# Patient Record
Sex: MALE | Race: WHITE | Employment: FULL TIME | URBAN - METROPOLITAN AREA
[De-identification: names, ages, dates, MRNs, and addresses within clinical notes are randomized per-mention and may not be internally consistent; named-entity substitution may affect disease eponyms.]

---

## 2018-03-26 ENCOUNTER — OFFICE VISIT (OUTPATIENT)
Dept: FAMILY MEDICINE CLINIC | Age: 58
End: 2018-03-26

## 2018-03-26 VITALS
SYSTOLIC BLOOD PRESSURE: 120 MMHG | OXYGEN SATURATION: 97 % | HEIGHT: 72 IN | TEMPERATURE: 98.1 F | DIASTOLIC BLOOD PRESSURE: 80 MMHG | BODY MASS INDEX: 27.36 KG/M2 | HEART RATE: 67 BPM | RESPIRATION RATE: 16 BRPM | WEIGHT: 202 LBS

## 2018-03-26 DIAGNOSIS — J32.9 SINOBRONCHITIS: Primary | ICD-10-CM

## 2018-03-26 DIAGNOSIS — J40 SINOBRONCHITIS: Primary | ICD-10-CM

## 2018-03-26 PROCEDURE — 99213 OFFICE O/P EST LOW 20 MIN: CPT | Performed by: PHYSICIAN ASSISTANT

## 2018-03-26 RX ORDER — CEFDINIR 300 MG/1
300 CAPSULE ORAL 2 TIMES DAILY
Qty: 20 CAPSULE | Refills: 0 | Status: SHIPPED | OUTPATIENT
Start: 2018-03-26 | End: 2018-04-05

## 2018-03-26 NOTE — PROGRESS NOTES
Colonoscopy (08, 09, 2012 Saurabh Velasquez). Social History:  reports that he has been smoking Cigars. He has a 15.00 pack-year smoking history. He uses smokeless tobacco. He reports that he drinks about 3.0 oz of alcohol per week . He reports that he does not use drugs. Family History: family history includes Cancer in his brother; Heart Disease (age of onset: 61) in his father. Allergies: Patient has no known allergies. Physical Exam         VS:  /80   Pulse 67   Temp 98.1 °F (36.7 °C)   Resp 16   Ht 6' (1.829 m)   Wt 202 lb (91.6 kg)   SpO2 97%   BMI 27.40 kg/m²    Oxygen Saturation Interpretation: Normal.    Constitutional:  Alert, development consistent with age. Head: Moderate TTP over the frontal and maxillary sinuses. Ears:  External Ears: Bilateral pinna normal. TMs dull without erythema or perforation bilaterally. Canals normal bilaterally without swelling or exudate  Nose:  Moderate congestion of the nasal mucosa. There is mild injection to middle turbinates bilaterally. Throat: Mild posterior pharyngeal erythema with mild post nasal drip present. No exudate or tonsillar hypertrophy noted. Neck:  Supple. There is no anterior cervical adenopathy. Lungs: Breath sounds are course bilaterally with mild end-expiratory wheezes throughout. No rales or rhonchi. Heart:  Regular rate and rhythm, normal heart sounds, without pathological murmurs, ectopy, gallops, or rubs. Skin:  Normal turgor. Warm, dry, without visible rash. Neurological:  Alert and oriented. Motor functions intact. Responds to verbal commands. Lab / Imaging Results   (All laboratory and radiology results have been personally reviewed by myself)  Labs:  No results found for this visit on 03/26/18. Assessment / Plan     Impression(s):  1. Sinobronchitis      Disposition:  Disposition: Discharge to home    Script written for Omnicef, side effects discussed.  Pt also offered prednisone today but he declined d/t unwanted side effects. He agrees to follow up with his PCP if he begins to experience SOB, wheezing, or persistent cough. Increase fluids and rest. Additional symptomatic relief discussed. F/u PCP in 5-7 days if symptoms persist. ED sooner if symptoms worsen or change. Red flag symptoms discussed. Pt is in agreement with this care plan. All questions answered.

## 2018-05-09 ENCOUNTER — HOSPITAL ENCOUNTER (OUTPATIENT)
Age: 58
Discharge: HOME | End: 2018-05-09
Payer: COMMERCIAL

## 2018-05-09 LAB
ALBUMIN SERPL-MCNC: 4.3 G/DL (ref 3.5–5.2)
ALP BLD-CCNC: 48 U/L (ref 40–129)
ALT SERPL-CCNC: 14 U/L (ref 0–40)
ANION GAP SERPL CALCULATED.3IONS-SCNC: 14 MMOL/L (ref 7–16)
AST SERPL-CCNC: 19 U/L (ref 0–39)
BILIRUB SERPL-MCNC: 0.4 MG/DL (ref 0–1.2)
BUN BLDV-MCNC: 17 MG/DL (ref 6–20)
CALCIUM SERPL-MCNC: 9.1 MG/DL (ref 8.6–10.2)
CHLORIDE BLD-SCNC: 103 MMOL/L (ref 98–107)
CO2: 23 MMOL/L (ref 22–29)
CREAT SERPL-MCNC: 0.9 MG/DL (ref 0.7–1.2)
GFR AFRICAN AMERICAN: >60
GFR NON-AFRICAN AMERICAN: >60 ML/MIN/1.73
GLUCOSE BLD-MCNC: 106 MG/DL (ref 74–109)
POTASSIUM SERPL-SCNC: 3.7 MMOL/L (ref 3.5–5)
SODIUM BLD-SCNC: 140 MMOL/L (ref 132–146)
TOTAL PROTEIN: 7.1 G/DL (ref 6.4–8.3)

## 2018-05-09 PROCEDURE — 36415 COLL VENOUS BLD VENIPUNCTURE: CPT

## 2018-05-09 PROCEDURE — 80053 COMPREHEN METABOLIC PANEL: CPT

## 2018-08-29 RX ORDER — CARVEDILOL 12.5 MG/1
TABLET ORAL
Qty: 180 TABLET | Refills: 1 | Status: SHIPPED | OUTPATIENT
Start: 2018-08-29 | End: 2019-02-27 | Stop reason: SDUPTHER

## 2018-09-10 ENCOUNTER — OFFICE VISIT (OUTPATIENT)
Dept: FAMILY MEDICINE CLINIC | Age: 58
End: 2018-09-10
Payer: COMMERCIAL

## 2018-09-10 VITALS
BODY MASS INDEX: 25.05 KG/M2 | HEART RATE: 68 BPM | SYSTOLIC BLOOD PRESSURE: 125 MMHG | RESPIRATION RATE: 18 BRPM | WEIGHT: 189 LBS | HEIGHT: 73 IN | DIASTOLIC BLOOD PRESSURE: 86 MMHG

## 2018-09-10 DIAGNOSIS — J30.9 ALLERGIC RHINITIS, UNSPECIFIED SEASONALITY, UNSPECIFIED TRIGGER: Chronic | ICD-10-CM

## 2018-09-10 DIAGNOSIS — R49.0 HOARSENESS: Primary | ICD-10-CM

## 2018-09-10 DIAGNOSIS — Z72.0 TOBACCO ABUSE: Chronic | ICD-10-CM

## 2018-09-10 DIAGNOSIS — I10 ESSENTIAL HYPERTENSION, BENIGN: Chronic | ICD-10-CM

## 2018-09-10 PROCEDURE — 99213 OFFICE O/P EST LOW 20 MIN: CPT | Performed by: FAMILY MEDICINE

## 2018-09-10 RX ORDER — LORATADINE 10 MG/1
TABLET ORAL
Qty: 90 TABLET | Refills: 1 | Status: SHIPPED
Start: 2018-09-10 | End: 2020-06-12 | Stop reason: SDUPTHER

## 2018-09-10 ASSESSMENT — PATIENT HEALTH QUESTIONNAIRE - PHQ9
SUM OF ALL RESPONSES TO PHQ9 QUESTIONS 1 & 2: 0
SUM OF ALL RESPONSES TO PHQ QUESTIONS 1-9: 0
SUM OF ALL RESPONSES TO PHQ QUESTIONS 1-9: 0
2. FEELING DOWN, DEPRESSED OR HOPELESS: 0
1. LITTLE INTEREST OR PLEASURE IN DOING THINGS: 0

## 2018-09-20 ENCOUNTER — TELEPHONE (OUTPATIENT)
Dept: ENT CLINIC | Age: 58
End: 2018-09-20

## 2018-09-20 ENCOUNTER — OFFICE VISIT (OUTPATIENT)
Dept: ENT CLINIC | Age: 58
End: 2018-09-20
Payer: COMMERCIAL

## 2018-09-20 VITALS
OXYGEN SATURATION: 99 % | HEIGHT: 73 IN | WEIGHT: 185 LBS | SYSTOLIC BLOOD PRESSURE: 120 MMHG | BODY MASS INDEX: 24.52 KG/M2 | DIASTOLIC BLOOD PRESSURE: 85 MMHG | HEART RATE: 67 BPM

## 2018-09-20 DIAGNOSIS — R49.0 HOARSENESS: ICD-10-CM

## 2018-09-20 DIAGNOSIS — J38.3 LESION OF VOCAL CORD: Primary | ICD-10-CM

## 2018-09-20 PROCEDURE — 31575 DIAGNOSTIC LARYNGOSCOPY: CPT | Performed by: OTOLARYNGOLOGY

## 2018-09-20 PROCEDURE — 99204 OFFICE O/P NEW MOD 45 MIN: CPT | Performed by: OTOLARYNGOLOGY

## 2018-09-20 ASSESSMENT — ENCOUNTER SYMPTOMS
GASTROINTESTINAL NEGATIVE: 1
EYES NEGATIVE: 1
SHORTNESS OF BREATH: 0
COLOR CHANGE: 0
ABDOMINAL PAIN: 0
RESPIRATORY NEGATIVE: 1

## 2018-09-20 NOTE — TELEPHONE ENCOUNTER
Spoke with Yuli at Dr. Diann Johnson office requesting clearance for patients Panendoscopy. Yuli called back and stated they will bring patient in at 3:00PM for EKG for clearance.

## 2018-09-20 NOTE — PATIENT INSTRUCTIONS
Thank you for choosing our [facility] or [facility] practice. We are committed to your medical treatment and  care. If you need to reschedule or cancel your surgery or follow up  appointment, please call the surgery scheduler at [phone]. INSTRUCTIONS FOR SURGERY 10/03/2018    Nothing to eat or drink after midnight the night before surgery unless surgery is at [facility] or otherwise instructed by the hospital.    DO NOT TAKE ANY ASPIRIN PRODUCTS 7 days prior to surgery. Tylenol only. No Advil, Motrin, Aleve, or Ibuprofen    Any illegal drugs in your system (including Marijuana even if legally prescribed) will result in your surgery being cancelled. Please be sure to check with our office or the hospital on time frame for the drugs to be out of your system. Should your insurance change at any time you must contact our office. Failure to do so may result in your surgery being rescheduled.     [facility] [address], [address] [place], [place] [address]let[facility] will call you a couple days prior to your surgery and give you further instructions, if any questions call them at [phone]

## 2018-09-20 NOTE — PROGRESS NOTES
oriented to person, place, and time. He appears well-developed and well-nourished. HENT:   Head: Normocephalic and atraumatic. Right Ear: Hearing, tympanic membrane, external ear and ear canal normal.   Left Ear: Hearing, tympanic membrane, external ear and ear canal normal.   Nose: Nose normal.   Eyes: Pupils are equal, round, and reactive to light. Conjunctivae and EOM are normal.   Neck: Normal range of motion. Neck supple. Cardiovascular: Normal rate, regular rhythm and normal heart sounds. Pulmonary/Chest: Effort normal and breath sounds normal.   Abdominal: Soft. Bowel sounds are normal.   Neurological: He is alert and oriented to person, place, and time. Skin: Skin is warm and dry. Nursing note and vitals reviewed. Endoscopy Procedure Note    Pre-operative Diagnosis: Hoarseness    Post-operative Diagnosis: left vocal cord lesion    Indications: Hoarseness, dysphagia or aspiration - not able to be clearly evaluated by indirect laryngoscopy    Anesthesia: Lidocaine 4% and Carrillo-Synephrine 1/2%    Endoscopy Type:  nasal endoscopy, nasopharyngoscopy, laryngoscopy    Procedure Details   With the patient sitting upright in the examining chair informed consent was obtained. The bilateral side(s) of the nose were topically anesthetized with spray. After waiting an appropriate period of time for anesthesia/ vasoconstriction to become effective, the 0-degree  flexible laryngoscope was passed through the right side(s) of the nose, and the nose, nasopharynx, oropharynx, hypopharynx and larynx were examined. An identical procedure was performed on the contralateral side. Examination was performed during quiet respiration and with phonation. I was present for the entire procedure. The following findings were noted.     Findings:  Mucosa:  pale and boggy and erythematous   Nasal septum:  deviated to left   Turbinates:  pale, swollen, edematous   Adenoid:  normal   Eustachian tubes:  normal   Mucous

## 2018-09-21 ENCOUNTER — OFFICE VISIT (OUTPATIENT)
Dept: FAMILY MEDICINE CLINIC | Age: 58
End: 2018-09-21
Payer: COMMERCIAL

## 2018-09-21 VITALS
OXYGEN SATURATION: 98 % | RESPIRATION RATE: 16 BRPM | BODY MASS INDEX: 24.12 KG/M2 | SYSTOLIC BLOOD PRESSURE: 111 MMHG | WEIGHT: 182 LBS | HEIGHT: 73 IN | DIASTOLIC BLOOD PRESSURE: 79 MMHG | HEART RATE: 74 BPM

## 2018-09-21 DIAGNOSIS — J41.0 SIMPLE CHRONIC BRONCHITIS (HCC): Primary | Chronic | ICD-10-CM

## 2018-09-21 DIAGNOSIS — J38.2 VOCAL CORD NODULE: ICD-10-CM

## 2018-09-21 DIAGNOSIS — I10 ESSENTIAL HYPERTENSION, BENIGN: Chronic | ICD-10-CM

## 2018-09-21 PROCEDURE — 99213 OFFICE O/P EST LOW 20 MIN: CPT | Performed by: FAMILY MEDICINE

## 2018-09-21 PROCEDURE — 93000 ELECTROCARDIOGRAM COMPLETE: CPT | Performed by: FAMILY MEDICINE

## 2018-09-21 NOTE — PROGRESS NOTES
Scheduled for vocal cord surgery next week with Dr. Talya Grewal  No exertional chest pain  No dyspnea  Remote history of atrial fibrillation that was treated with ablation with excellent results. Patient is otherwise doing well except for hoarseness. There is no dysphagia or pain. No weight loss. No cough or wheezing. No history of coronary artery disease or of chronic lung disease. Physical examination :  /79   Pulse 74   Resp 16   Ht 6' 1\" (1.854 m)   Wt 182 lb (82.6 kg)   SpO2 98%   BMI 24.01 kg/m²     General appearance : healthy, no distress. Eyes : non-icteric sclerae. No goiter. Neck is supple, no masses. No carotid bruits  Lungs : clear bilaterally, no wheezing or crackles. Heart : regular, normal S1S2, no murmurs. Abdomen : soft, no masses. No hepatic or splenomegaly. Extremities : no edema. Peripheral pulses : normal.  Gait : normal.  Mood :euthymic. Affect : congruent. BMI was below limits today and the following plan was recommended: Gen. diet. EKG performed today is unremarkable. A/P  Vocal cord nodule. May proceed with surgery as scheduled. Patient fully informed.

## 2018-09-26 NOTE — PROGRESS NOTES
[facility] PRE-ADMISSION TESTING INSTRUCTIONS    The Preadmission Testing patient is instructed accordingly using the following criteria (check applicable):    ARRIVAL INSTRUCTIONS:  [x] Parking the day of Surgery is located in the Main Entrance lot. Upon entering the door, make an immediate right to the surgery reception desk    [x] Bring photo ID and insurance card    [] Bring in a copy of Living will or Durable Power of  papers. [x] Please be sure to arrange transportation to and from the hospital    [x] Please arrange for someone to be with you the remainder of the day due to having anesthesia      GENERAL INSTRUCTIONS:    [x] Nothing by mouth after midnight, including gum, candy, mints or water    [x] You may brush your teeth, but do not swallow any water    [x] Take medications as instructed with 1-2 oz of water    [] Stop herbal supplements and vitamins 5 days prior to procedure    [x] Follow preop dosing of blood thinners per physician instructions    [] Do not take insulin or oral diabetic medications    [] If diabetic and have low blood sugar or feel symptomatic, take 1-2oz apple juice or glucose tablets    [] Bring inhalers day of surgery    [] Bring C-PAP/ Bi-Pap day of surgery    [] Bring urine specimen day of surgery    [x] Antibacterial Soap shower or bath AM of Surgery, no lotion, powders or creams to surgical site    [] Follow bowel prep as instructed per surgeon    [x] No tobacco products within 24 hours of surgery     [x] No alcohol or illegal drug use within 24 hours of surgery.     [x] Jewelry, body piercing's, eyeglasses, contact lenses and dentures are not permitted into surgery (bring cases)      [] Please do not wear any nail polish or make up on the day of surgery    [x] If not already done, you can expect a call from registration    [x] If surgeon requests a time change you will be notified the day prior to surgery    [] If you receive a survey after

## 2018-09-27 ENCOUNTER — HOSPITAL ENCOUNTER (OUTPATIENT)
Dept: CT IMAGING | Age: 58
Discharge: HOME | End: 2018-09-29
Payer: COMMERCIAL

## 2018-09-27 DIAGNOSIS — J38.3 LESION OF VOCAL CORD: ICD-10-CM

## 2018-09-27 PROCEDURE — 70492 CT SFT TSUE NCK W/O & W/DYE: CPT

## 2018-09-27 PROCEDURE — 6360000004 HC RX CONTRAST MEDICATION: Performed by: RADIOLOGY

## 2018-09-27 RX ADMIN — IOPAMIDOL 90 ML: 755 INJECTION, SOLUTION INTRAVENOUS at 06:46

## 2018-09-28 ENCOUNTER — HOSPITAL ENCOUNTER (OUTPATIENT)
Age: 58
Setting detail: OUTPATIENT SURGERY
Discharge: HOME | End: 2018-09-28
Attending: OTOLARYNGOLOGY | Admitting: OTOLARYNGOLOGY
Payer: COMMERCIAL

## 2018-09-28 ENCOUNTER — ANESTHESIA (OUTPATIENT)
Dept: OPERATING ROOM | Age: 58
End: 2018-09-28
Payer: COMMERCIAL

## 2018-09-28 ENCOUNTER — ANESTHESIA EVENT (OUTPATIENT)
Dept: OPERATING ROOM | Age: 58
End: 2018-09-28
Payer: COMMERCIAL

## 2018-09-28 VITALS
RESPIRATION RATE: 18 BRPM | OXYGEN SATURATION: 96 % | BODY MASS INDEX: 25.05 KG/M2 | HEIGHT: 73 IN | SYSTOLIC BLOOD PRESSURE: 129 MMHG | TEMPERATURE: 97.9 F | HEART RATE: 60 BPM | DIASTOLIC BLOOD PRESSURE: 86 MMHG | WEIGHT: 189 LBS

## 2018-09-28 VITALS — SYSTOLIC BLOOD PRESSURE: 169 MMHG | OXYGEN SATURATION: 100 % | DIASTOLIC BLOOD PRESSURE: 94 MMHG

## 2018-09-28 DIAGNOSIS — G89.18 POST-OP PAIN: Primary | ICD-10-CM

## 2018-09-28 LAB
ANION GAP SERPL CALCULATED.3IONS-SCNC: 10 MMOL/L (ref 7–16)
BUN BLDV-MCNC: 12 MG/DL (ref 6–20)
CALCIUM SERPL-MCNC: 8.9 MG/DL (ref 8.6–10.2)
CHLORIDE BLD-SCNC: 102 MMOL/L (ref 98–107)
CO2: 25 MMOL/L (ref 22–29)
CREAT SERPL-MCNC: 0.8 MG/DL (ref 0.7–1.2)
GFR AFRICAN AMERICAN: >60
GFR NON-AFRICAN AMERICAN: >60 ML/MIN/1.73
GLUCOSE BLD-MCNC: 98 MG/DL (ref 74–109)
HCT VFR BLD CALC: 44.8 % (ref 37–54)
HEMOGLOBIN: 15.1 G/DL (ref 12.5–16.5)
MCH RBC QN AUTO: 32.2 PG (ref 26–35)
MCHC RBC AUTO-ENTMCNC: 33.7 % (ref 32–34.5)
MCV RBC AUTO: 95.5 FL (ref 80–99.9)
PDW BLD-RTO: 13.3 FL (ref 11.5–15)
PLATELET # BLD: 277 E9/L (ref 130–450)
PMV BLD AUTO: 9.1 FL (ref 7–12)
POTASSIUM SERPL-SCNC: 4.3 MMOL/L (ref 3.5–5)
RBC # BLD: 4.69 E12/L (ref 3.8–5.8)
SODIUM BLD-SCNC: 137 MMOL/L (ref 132–146)
WBC # BLD: 10.6 E9/L (ref 4.5–11.5)

## 2018-09-28 PROCEDURE — 7100000011 HC PHASE II RECOVERY - ADDTL 15 MIN: Performed by: OTOLARYNGOLOGY

## 2018-09-28 PROCEDURE — 7100000001 HC PACU RECOVERY - ADDTL 15 MIN: Performed by: OTOLARYNGOLOGY

## 2018-09-28 PROCEDURE — 2500000003 HC RX 250 WO HCPCS: Performed by: NURSE ANESTHETIST, CERTIFIED REGISTERED

## 2018-09-28 PROCEDURE — 6360000002 HC RX W HCPCS: Performed by: NURSE ANESTHETIST, CERTIFIED REGISTERED

## 2018-09-28 PROCEDURE — 3700000001 HC ADD 15 MINUTES (ANESTHESIA): Performed by: OTOLARYNGOLOGY

## 2018-09-28 PROCEDURE — 7100000000 HC PACU RECOVERY - FIRST 15 MIN: Performed by: OTOLARYNGOLOGY

## 2018-09-28 PROCEDURE — 80048 BASIC METABOLIC PNL TOTAL CA: CPT

## 2018-09-28 PROCEDURE — 2580000003 HC RX 258: Performed by: NURSE ANESTHETIST, CERTIFIED REGISTERED

## 2018-09-28 PROCEDURE — 31622 DX BRONCHOSCOPE/WASH: CPT | Performed by: OTOLARYNGOLOGY

## 2018-09-28 PROCEDURE — 7100000010 HC PHASE II RECOVERY - FIRST 15 MIN: Performed by: OTOLARYNGOLOGY

## 2018-09-28 PROCEDURE — 43191 ESOPHAGOSCOPY RIGID TRNSO DX: CPT | Performed by: OTOLARYNGOLOGY

## 2018-09-28 PROCEDURE — 88305 TISSUE EXAM BY PATHOLOGIST: CPT

## 2018-09-28 PROCEDURE — 88331 PATH CONSLTJ SURG 1 BLK 1SPC: CPT

## 2018-09-28 PROCEDURE — 2580000003 HC RX 258: Performed by: OTOLARYNGOLOGY

## 2018-09-28 PROCEDURE — 85027 COMPLETE CBC AUTOMATED: CPT

## 2018-09-28 PROCEDURE — 3600000002 HC SURGERY LEVEL 2 BASE: Performed by: OTOLARYNGOLOGY

## 2018-09-28 PROCEDURE — 2709999900 HC NON-CHARGEABLE SUPPLY: Performed by: OTOLARYNGOLOGY

## 2018-09-28 PROCEDURE — 36415 COLL VENOUS BLD VENIPUNCTURE: CPT

## 2018-09-28 PROCEDURE — 3600000012 HC SURGERY LEVEL 2 ADDTL 15MIN: Performed by: OTOLARYNGOLOGY

## 2018-09-28 PROCEDURE — 6370000000 HC RX 637 (ALT 250 FOR IP): Performed by: OTOLARYNGOLOGY

## 2018-09-28 PROCEDURE — 31536 LARYNGOSCOPY W/BX & OP SCOPE: CPT | Performed by: OTOLARYNGOLOGY

## 2018-09-28 PROCEDURE — 3700000000 HC ANESTHESIA ATTENDED CARE: Performed by: OTOLARYNGOLOGY

## 2018-09-28 RX ORDER — SODIUM CHLORIDE 0.9 % (FLUSH) 0.9 %
10 SYRINGE (ML) INJECTION EVERY 12 HOURS SCHEDULED
Status: DISCONTINUED | OUTPATIENT
Start: 2018-09-28 | End: 2018-09-28 | Stop reason: HOSPADM

## 2018-09-28 RX ORDER — FENTANYL CITRATE 50 UG/ML
INJECTION, SOLUTION INTRAMUSCULAR; INTRAVENOUS PRN
Status: DISCONTINUED | OUTPATIENT
Start: 2018-09-28 | End: 2018-09-28 | Stop reason: SDUPTHER

## 2018-09-28 RX ORDER — MEPERIDINE HYDROCHLORIDE 25 MG/ML
12.5 INJECTION INTRAMUSCULAR; INTRAVENOUS; SUBCUTANEOUS EVERY 5 MIN PRN
Status: DISCONTINUED | OUTPATIENT
Start: 2018-09-28 | End: 2018-09-28 | Stop reason: HOSPADM

## 2018-09-28 RX ORDER — PREDNISONE 20 MG/1
40 TABLET ORAL DAILY
Qty: 10 TABLET | Refills: 0 | Status: SHIPPED | OUTPATIENT
Start: 2018-09-28 | End: 2018-10-03

## 2018-09-28 RX ORDER — DIPHENHYDRAMINE HYDROCHLORIDE 50 MG/ML
12.5 INJECTION INTRAMUSCULAR; INTRAVENOUS
Status: DISCONTINUED | OUTPATIENT
Start: 2018-09-28 | End: 2018-09-28 | Stop reason: HOSPADM

## 2018-09-28 RX ORDER — ROCURONIUM BROMIDE 10 MG/ML
INJECTION, SOLUTION INTRAVENOUS PRN
Status: DISCONTINUED | OUTPATIENT
Start: 2018-09-28 | End: 2018-09-28 | Stop reason: SDUPTHER

## 2018-09-28 RX ORDER — HYDROCODONE BITARTRATE AND ACETAMINOPHEN 5; 325 MG/1; MG/1
1 TABLET ORAL PRN
Status: DISCONTINUED | OUTPATIENT
Start: 2018-09-28 | End: 2018-09-28 | Stop reason: HOSPADM

## 2018-09-28 RX ORDER — PROPOFOL 10 MG/ML
INJECTION, EMULSION INTRAVENOUS PRN
Status: DISCONTINUED | OUTPATIENT
Start: 2018-09-28 | End: 2018-09-28 | Stop reason: SDUPTHER

## 2018-09-28 RX ORDER — GLYCOPYRROLATE 0.2 MG/ML
INJECTION INTRAMUSCULAR; INTRAVENOUS PRN
Status: DISCONTINUED | OUTPATIENT
Start: 2018-09-28 | End: 2018-09-28 | Stop reason: SDUPTHER

## 2018-09-28 RX ORDER — GLYCOPYRROLATE 1 MG/5 ML
SYRINGE (ML) INTRAVENOUS PRN
Status: DISCONTINUED | OUTPATIENT
Start: 2018-09-28 | End: 2018-09-28 | Stop reason: SDUPTHER

## 2018-09-28 RX ORDER — ONDANSETRON 2 MG/ML
INJECTION INTRAMUSCULAR; INTRAVENOUS PRN
Status: DISCONTINUED | OUTPATIENT
Start: 2018-09-28 | End: 2018-09-28 | Stop reason: SDUPTHER

## 2018-09-28 RX ORDER — SUCCINYLCHOLINE CHLORIDE 20 MG/ML
INJECTION INTRAMUSCULAR; INTRAVENOUS PRN
Status: DISCONTINUED | OUTPATIENT
Start: 2018-09-28 | End: 2018-09-28 | Stop reason: SDUPTHER

## 2018-09-28 RX ORDER — EPINEPHRINE NASAL SOLUTION 1 MG/ML
SOLUTION NASAL PRN
Status: DISCONTINUED | OUTPATIENT
Start: 2018-09-28 | End: 2018-09-28 | Stop reason: HOSPADM

## 2018-09-28 RX ORDER — LIDOCAINE HYDROCHLORIDE 20 MG/ML
INJECTION, SOLUTION INFILTRATION; PERINEURAL PRN
Status: DISCONTINUED | OUTPATIENT
Start: 2018-09-28 | End: 2018-09-28 | Stop reason: SDUPTHER

## 2018-09-28 RX ORDER — MIDAZOLAM HYDROCHLORIDE 1 MG/ML
INJECTION INTRAMUSCULAR; INTRAVENOUS PRN
Status: DISCONTINUED | OUTPATIENT
Start: 2018-09-28 | End: 2018-09-28 | Stop reason: SDUPTHER

## 2018-09-28 RX ORDER — DEXAMETHASONE SODIUM PHOSPHATE 4 MG/ML
INJECTION, SOLUTION INTRA-ARTICULAR; INTRALESIONAL; INTRAMUSCULAR; INTRAVENOUS; SOFT TISSUE PRN
Status: DISCONTINUED | OUTPATIENT
Start: 2018-09-28 | End: 2018-09-28 | Stop reason: SDUPTHER

## 2018-09-28 RX ORDER — SODIUM CHLORIDE 0.9 % (FLUSH) 0.9 %
10 SYRINGE (ML) INJECTION PRN
Status: DISCONTINUED | OUTPATIENT
Start: 2018-09-28 | End: 2018-09-28 | Stop reason: HOSPADM

## 2018-09-28 RX ORDER — SODIUM CHLORIDE, SODIUM LACTATE, POTASSIUM CHLORIDE, CALCIUM CHLORIDE 600; 310; 30; 20 MG/100ML; MG/100ML; MG/100ML; MG/100ML
INJECTION, SOLUTION INTRAVENOUS CONTINUOUS PRN
Status: DISCONTINUED | OUTPATIENT
Start: 2018-09-28 | End: 2018-09-28 | Stop reason: SDUPTHER

## 2018-09-28 RX ORDER — NEOSTIGMINE METHYLSULFATE 1 MG/ML
INJECTION, SOLUTION INTRAVENOUS PRN
Status: DISCONTINUED | OUTPATIENT
Start: 2018-09-28 | End: 2018-09-28 | Stop reason: SDUPTHER

## 2018-09-28 RX ORDER — HYDROCODONE BITARTRATE AND ACETAMINOPHEN 7.5; 325 MG/1; MG/1
1 TABLET ORAL EVERY 6 HOURS PRN
Qty: 10 TABLET | Refills: 0 | Status: SHIPPED | OUTPATIENT
Start: 2018-09-28 | End: 2018-10-02

## 2018-09-28 RX ORDER — HYDROCODONE BITARTRATE AND ACETAMINOPHEN 5; 325 MG/1; MG/1
2 TABLET ORAL PRN
Status: DISCONTINUED | OUTPATIENT
Start: 2018-09-28 | End: 2018-09-28 | Stop reason: HOSPADM

## 2018-09-28 RX ADMIN — Medication 0.6 MG: at 17:40

## 2018-09-28 RX ADMIN — ONDANSETRON HYDROCHLORIDE 4 MG: 2 INJECTION, SOLUTION INTRAMUSCULAR; INTRAVENOUS at 16:50

## 2018-09-28 RX ADMIN — FENTANYL CITRATE 25 MCG: 50 INJECTION, SOLUTION INTRAMUSCULAR; INTRAVENOUS at 17:10

## 2018-09-28 RX ADMIN — DEXAMETHASONE SODIUM PHOSPHATE 10 MG: 4 INJECTION, SOLUTION INTRAMUSCULAR; INTRAVENOUS at 16:50

## 2018-09-28 RX ADMIN — FENTANYL CITRATE 25 MCG: 50 INJECTION, SOLUTION INTRAMUSCULAR; INTRAVENOUS at 17:00

## 2018-09-28 RX ADMIN — FENTANYL CITRATE 100 MCG: 50 INJECTION, SOLUTION INTRAMUSCULAR; INTRAVENOUS at 16:38

## 2018-09-28 RX ADMIN — ROCURONIUM BROMIDE 20 MG: 10 SOLUTION INTRAVENOUS at 16:49

## 2018-09-28 RX ADMIN — PROPOFOL 120 MG: 10 INJECTION, EMULSION INTRAVENOUS at 16:38

## 2018-09-28 RX ADMIN — FENTANYL CITRATE 25 MCG: 50 INJECTION, SOLUTION INTRAMUSCULAR; INTRAVENOUS at 17:14

## 2018-09-28 RX ADMIN — FENTANYL CITRATE 50 MCG: 50 INJECTION, SOLUTION INTRAMUSCULAR; INTRAVENOUS at 17:30

## 2018-09-28 RX ADMIN — FENTANYL CITRATE 25 MCG: 50 INJECTION, SOLUTION INTRAMUSCULAR; INTRAVENOUS at 17:07

## 2018-09-28 RX ADMIN — GLYCOPYRROLATE 0.2 MG: 0.2 INJECTION, SOLUTION INTRAMUSCULAR; INTRAVENOUS at 16:30

## 2018-09-28 RX ADMIN — MIDAZOLAM HYDROCHLORIDE 2 MG: 1 INJECTION, SOLUTION INTRAMUSCULAR; INTRAVENOUS at 16:30

## 2018-09-28 RX ADMIN — SUCCINYLCHOLINE CHLORIDE 100 MG: 20 INJECTION, SOLUTION INTRAMUSCULAR; INTRAVENOUS at 16:38

## 2018-09-28 RX ADMIN — LIDOCAINE HYDROCHLORIDE 100 MG: 20 INJECTION, SOLUTION INFILTRATION; PERINEURAL at 16:38

## 2018-09-28 RX ADMIN — Medication 3 MG: at 17:40

## 2018-09-28 RX ADMIN — SODIUM CHLORIDE, POTASSIUM CHLORIDE, SODIUM LACTATE AND CALCIUM CHLORIDE: 600; 310; 30; 20 INJECTION, SOLUTION INTRAVENOUS at 16:30

## 2018-09-28 RX ADMIN — SODIUM CHLORIDE, POTASSIUM CHLORIDE, SODIUM LACTATE AND CALCIUM CHLORIDE: 600; 310; 30; 20 INJECTION, SOLUTION INTRAVENOUS at 16:54

## 2018-09-28 ASSESSMENT — PULMONARY FUNCTION TESTS
PIF_VALUE: 0
PIF_VALUE: 34
PIF_VALUE: 35
PIF_VALUE: 1
PIF_VALUE: 34
PIF_VALUE: 38
PIF_VALUE: 34
PIF_VALUE: 0
PIF_VALUE: 34
PIF_VALUE: 1
PIF_VALUE: 27
PIF_VALUE: 1
PIF_VALUE: 38
PIF_VALUE: 2
PIF_VALUE: 35
PIF_VALUE: 0
PIF_VALUE: 34
PIF_VALUE: 35
PIF_VALUE: 36
PIF_VALUE: 19
PIF_VALUE: 34
PIF_VALUE: 38
PIF_VALUE: 38
PIF_VALUE: 0
PIF_VALUE: 31
PIF_VALUE: 38
PIF_VALUE: 3
PIF_VALUE: 35
PIF_VALUE: 38
PIF_VALUE: 29
PIF_VALUE: 1
PIF_VALUE: 36
PIF_VALUE: 30
PIF_VALUE: 38
PIF_VALUE: 34
PIF_VALUE: 34
PIF_VALUE: 26
PIF_VALUE: 38
PIF_VALUE: 0
PIF_VALUE: 34
PIF_VALUE: 21
PIF_VALUE: 1
PIF_VALUE: 12
PIF_VALUE: 38
PIF_VALUE: 0
PIF_VALUE: 38
PIF_VALUE: 35
PIF_VALUE: 1
PIF_VALUE: 38
PIF_VALUE: 20
PIF_VALUE: 17
PIF_VALUE: 39
PIF_VALUE: 38
PIF_VALUE: 38
PIF_VALUE: 34
PIF_VALUE: 24
PIF_VALUE: 38
PIF_VALUE: 3
PIF_VALUE: 43
PIF_VALUE: 34
PIF_VALUE: 3
PIF_VALUE: 40
PIF_VALUE: 38
PIF_VALUE: 35
PIF_VALUE: 38
PIF_VALUE: 3
PIF_VALUE: 38
PIF_VALUE: 30
PIF_VALUE: 38
PIF_VALUE: 14
PIF_VALUE: 12
PIF_VALUE: 19

## 2018-09-28 ASSESSMENT — PAIN SCALES - GENERAL
PAINLEVEL_OUTOF10: 0

## 2018-09-28 ASSESSMENT — LIFESTYLE VARIABLES: SMOKING_STATUS: 1

## 2018-09-28 ASSESSMENT — PAIN DESCRIPTION - LOCATION: LOCATION: THROAT

## 2018-09-28 NOTE — PROGRESS NOTES
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

## 2018-09-28 NOTE — H&P
Subjective:      Patient ID:  Ángela Pickering is a 60 y.o. male.     HPI:     Hoarseness  Patient presents with hoarseness. The patient describes moderate episodes of hoarseness which are unchanged over time. The episodes began 3 month(s) ago.       Symptoms of gastroesophageal reflux is not noted. Tx: none      Symptoms of allergic rhinitis is noted. Tx: Claritin        Trauma/Surgeries in the chest or neck? no  Type:      Previous prolonged intubation: no  Time:   ago.       Never had hoarseness issues prior to this.                    History   Smoking Status    Current Every Day Smoker    Packs/day: 0.50    Years: 30.00    Types: Cigars   Smokeless Tobacco    Never Used       Comment: smoke cigars      Social History   Social History            Social History    Marital status:        Spouse name: N/A    Number of children: N/A    Years of education: N/A             Social History Main Topics    Smoking status: Current Every Day Smoker       Packs/day: 0.50       Years: 30.00       Types: Cigars    Smokeless tobacco: Never Used         Comment: smoke cigars    Alcohol use No    Drug use: No    Sexual activity: Yes       Partners: Female           Other Topics Concern    None          Social History Narrative    None                  Patient's medications, allergies, past medical, surgical, social and family histories were reviewed and updated as appropriate.           Review of Systems   Constitutional: Negative. Eyes: Negative. Negative for visual disturbance. Respiratory: Negative. Negative for shortness of breath. Cardiovascular: Negative. Negative for chest pain. Gastrointestinal: Negative. Negative for abdominal pain. Genitourinary: Negative. Musculoskeletal: Negative. Skin: Negative. Negative for color change. Neurological: Negative. Psychiatric/Behavioral: Negative. Negative for behavioral problems and hallucinations.    All other systems reviewed Turbinates:  pale, swollen, edematous   Adenoid:  normal   Eustachian tubes:  normal   Mucous stranding:  present   Lesions:  present   Modified Anderson's Maneuver not indicated   Larynx Lesion of left true vocal cord noted. Left cord limited in mobilty         Condition:  Stable     Complications:  None     Pictures:                                              Assessment:        Diagnosis Orders   1. Hoarseness      2. Lesion of vocal cord                             Plan:      I recommend:     Panendoscopy, with esophagoscopy, bronchoscopy and direct laryngoscopy, microsuspension with biposy of left vocal cord lesion; Frozen section     The procedure risks and benefits were discussed with the patient and family. Pt and family understood and decided to proceed with the surgery.        Surgical risks include:     -- Tearing of tissues is the most common problem. This is most common in esophagoscopy using a rigid scope. Tearing of the mucosa of the esophagus or hypopharynx can cause mediastinitus or a severe infection in the chest. Injury to the vocal cords can occur with laryngosopy as can injury to the lungs during bronchoscopy. Lung injury can cause air to leak into the chest cavity. If severe it can fill the chest cavity and compress the lungs producing a tension pneumothorax. Insertion of a chest tube would be emergently required to treat the patient.    - injury to lips, teeth or tongue            Follow up in 1 week(s)    Electronically signed by Cortez Mclaughlin DO on 9/28/18 at 7:32 AM

## 2018-09-28 NOTE — ANESTHESIA PRE PROCEDURE
Department of Anesthesiology  Preprocedure Note       Name:  Stefania Bess   Age:  60 y.o.  :  1960                                          MRN:  [id number]         Date:  2018      Surgeon: Cornelius Mcmillan):  Marissa Boudreaux DO    Procedure: Procedure(s):  PANDOSCOPY WITH BIOPSY LEFT TRUE VOCAL CORD WITH FROZEN SECTION ++PATHOLOGY NEEDED++  ESOPHAGOSCOPY RIGID    Medications prior to admission:   Prior to Admission medications    Medication Sig Start Date End Date Taking? Authorizing Provider   loratadine (CLARITIN) 10 MG tablet TAKE 1 TABLET BY MOUTH ONCE A DAY 9/10/18  Yes Henry Urban MD   carvedilol (COREG) 12.5 MG tablet TAKE 1 TABLET BY MOUTH TWICE A DAY 18  Yes Henry Urban MD   aspirin 81 MG tablet Take 81 mg by mouth daily.    Yes Historical Provider, MD       Current medications:    Current Facility-Administered Medications   Medication Dose Route Frequency Provider Last Rate Last Dose    sodium chloride flush 0.9 % injection 10 mL  10 mL Intravenous 2 times per day Sadaf Begum DO        sodium chloride flush 0.9 % injection 10 mL  10 mL Intravenous PRN Sadaf Begum DO        meperidine (DEMEROL) injection 12.5 mg  12.5 mg Intravenous Q5 Min PRN Johnny Nicole MD        diphenhydrAMINE (BENADRYL) injection 12.5 mg  12.5 mg Intravenous Once PRN Johnny Nicole MD        HYDROcodone-acetaminophen [facility]) 5-325 MG per tablet 1 tablet  1 tablet Oral PRN Johnny Nicole MD        Or    HYDROcodone-acetaminophen (NORCO) 5-325 MG per tablet 2 tablet  2 tablet Oral PRN Johnny Nicole MD        HYDROmorphone (DILAUDID) injection 0.5 mg  0.5 mg Intravenous Q5 Min PRN Johnny Nicole MD        HYDROmorphone (DILAUDID) injection 0.25 mg  0.25 mg Intravenous Q5 Min PRN Johnny Nicole MD           Allergies:  No Known Allergies    Problem List:    Patient Active Problem List   Diagnosis Code    Essential hypertension, benign I10    Cardiomyopathy ([facility] [address] I42.9    Status: Time of last liquid consumption: 2230                        Time of last solid consumption: 2230                        Date of last liquid consumption: 09/27/18                        Date of last solid food consumption: 09/27/18    BMI:   Wt Readings from Last 3 Encounters:   09/26/18 189 lb (85.7 kg)   09/21/18 182 lb (82.6 kg)   09/20/18 185 lb (83.9 kg)     Body mass index is 24.94 kg/m². CBC:   Lab Results   Component Value Date    WBC 10.9 12/05/2016    RBC 4.65 12/05/2016    HGB 14.8 12/05/2016    HCT 42.9 12/05/2016    MCV 92.2 12/05/2016    RDW 13.9 12/05/2016     12/05/2016       CMP:   Lab Results   Component Value Date     05/09/2018    K 3.7 05/09/2018     05/09/2018    CO2 23 05/09/2018    BUN 17 05/09/2018    CREATININE 0.9 05/09/2018    GFRAA >60 05/09/2018    LABGLOM >60 05/09/2018    GLUCOSE 106 05/09/2018    GLUCOSE 106 11/19/2011    PROT 7.1 05/09/2018    CALCIUM 9.1 05/09/2018    BILITOT 0.4 05/09/2018    ALKPHOS 48 05/09/2018    AST 19 05/09/2018    ALT 14 05/09/2018       POC Tests: No results for input(s): POCGLU, POCNA, POCK, POCCL, POCBUN, POCHEMO, POCHCT in the last 72 hours.     Coags:   Lab Results   Component Value Date    PROTIME 10.8 11/18/2011    INR 1.0 11/18/2011    APTT 26.5 11/18/2011       HCG (If Applicable): No results found for: PREGTESTUR, PREGSERUM, HCG, HCGQUANT     ABGs: No results found for: PHART, PO2ART, CZU1DSL, IVH5RYE, BEART, A7RAFYLB     Type & Screen (If Applicable):  No results found for: LABABO, [address]    Anesthesia Evaluation  Patient summary reviewed no history of anesthetic complications:   Airway: Mallampati: I  TM distance: >3 FB   Neck ROM: full   Dental: normal exam         Pulmonary: breath sounds clear to auscultation  (+) COPD:  current smoker                          ROS comment: Chronic rhinitis   Cardiovascular:    (+) hypertension:, dysrhythmias (s/p ablation): atrial fibrillation, hyperlipidemia      ECG reviewed  Rhythm: regular  Rate: normal           Beta Blocker:  Dose within 24 Hrs         Neuro/Psych:   Negative Neuro/Psych ROS              GI/Hepatic/Renal: Neg GI/Hepatic/Renal ROS            Endo/Other:                      ROS comment: History of alcohol abuse  impaired glucose tolerance Abdominal:           Vascular: negative vascular ROS. Anesthesia Plan      general     ASA 3       Induction: intravenous. Anesthetic plan and risks discussed with patient. Plan discussed with CRNA.                   Aaliyah Parrish MD   9/28/2018

## 2018-09-28 NOTE — ANESTHESIA POSTPROCEDURE EVALUATION
Department of Anesthesiology  Postprocedure Note    Patient: Natalio Jay  MRN: [id number]  YOB: 1960  Date of evaluation: 9/28/2018  Time:  7:43 PM     Procedure Summary     Date:  09/28/18 Room / Location:  [facility] OR 01 / [facility] OR    Anesthesia Start:  1630 Anesthesia Stop:  2631    Procedures:       PANDOSCOPY WITH BIOPSY LEFT TRUE VOCAL CORD WITH FROZEN SECTION ++PATHOLOGY NEEDED++ (N/A Throat)      ESOPHAGOSCOPY RIGID (N/A ) Diagnosis:  (LEFT VOCAL CORD LESION)    Surgeon:  Abel Andrews DO Responsible Provider:  Lucia Grande MD    Anesthesia Type:  general ASA Status:  3          Anesthesia Type: general    Thomas Phase I: Thomas Score: 8    Thomas Phase II: Thomas Score: 10    Last vitals: Reviewed and per EMR flowsheets.        Anesthesia Post Evaluation    Patient location during evaluation: PACU  Patient participation: complete - patient participated  Level of consciousness: awake and alert  Airway patency: patent  Nausea & Vomiting: no nausea and no vomiting  Complications: no  Cardiovascular status: hemodynamically stable  Respiratory status: acceptable  Hydration status: euvolemic

## 2018-09-29 NOTE — OP NOTE
[address] [address]                                 OPERATIVE REPORT    PATIENT NAME: Candace Vasquez                   :        1960  MED REC NO:   [id number]                            ROOM:  ACCOUNT NO:   [de-identified]                           ADMIT DATE: 2018  PROVIDER:     Suzie Alejo DO    DATE OF PROCEDURE:  2018    PREOPERATIVE DIAGNOSIS:  Left true vocal cord mass. POSTOPERATIVE DIAGNOSIS:  Left true vocal cord mass. PROCEDURES PERFORMED:  Panendoscopy with micro suspension, direct  laryngoscopy with biopsy of left true vocal cord. SURGEON:  Lonnie Juarez DO    ASSISTANTS:  Suzie Alejo, PGY-IV and Dr. Blanca Mobley, PGY-1.    ANESTHESIA:  General.    ESTIMATED BLOOD LOSS:  5 mL. SPECIMENS:  Were obtained, left true vocal cord mass. COMPLICATIONS:  None. INDICATIONS FOR PROCEDURE:  The patient is a 50-year-old male with a  history of 15-pack-year smoking. He presented to the office, and an  endoscopy revealed a left true vocal cord mass. Panendoscopy with biopsy  was recommended. The risks, benefits, and alternatives of the procedure  were discussed with the patient, who expressed understanding and agreed to  proceed. DESCRIPTION OF PROCEDURE:  The patient was brought into the OR and placed  supine on the operative table. Sequential compression devices were on and  functioning at the start of the procedure. No preoperative antibiotics  were administered. Prior to the start of the procedure, a timeout was  called and agreed upon by all present. The patient was then prepped and  draped in the usual fashion after anesthesia was obtained. First, a tooth protector was inserted into the patient's oral cavity. Next, a rigid esophagoscope was inserted into the oral cavity and advanced  into the cervical esophagus.   This was advanced past the upper expected to be discharged home today following appropriate  recovery in the PACU. Dr. Anuj Coleman was present for the entire procedure.         Valentino Hoyos DO    D: 09/28/2018 17:47:14       T: 09/29/2018 0:30:14     [email or web address]  Job#: [id number]     Doc#: [id number]    CC:

## 2018-10-05 ENCOUNTER — OFFICE VISIT (OUTPATIENT)
Dept: ENT CLINIC | Age: 58
End: 2018-10-05

## 2018-10-05 VITALS
HEART RATE: 77 BPM | DIASTOLIC BLOOD PRESSURE: 89 MMHG | OXYGEN SATURATION: 99 % | WEIGHT: 194 LBS | HEIGHT: 73 IN | SYSTOLIC BLOOD PRESSURE: 125 MMHG | BODY MASS INDEX: 25.71 KG/M2

## 2018-10-05 DIAGNOSIS — C32.0 SQUAMOUS CELL CARCINOMA OF LEFT VOCAL CORD (HCC): ICD-10-CM

## 2018-10-05 DIAGNOSIS — C32.0 CANCER OF GLOTTIS (HCC): ICD-10-CM

## 2018-10-05 PROCEDURE — 99024 POSTOP FOLLOW-UP VISIT: CPT | Performed by: OTOLARYNGOLOGY

## 2018-10-05 ASSESSMENT — ENCOUNTER SYMPTOMS
ABDOMINAL PAIN: 0
SHORTNESS OF BREATH: 0
RESPIRATORY NEGATIVE: 1
GASTROINTESTINAL NEGATIVE: 1
EYES NEGATIVE: 1
COLOR CHANGE: 0

## 2018-10-05 NOTE — PROGRESS NOTES
Subjective:      Patient ID:  Reji Mcnulty is a 60 y.o. male. HPI:    60 y.o. male here for 1 week PO panendoscopy with biopsy of L TVC and anterior commissure. Denies any pain or bleeding. Patient is still hoarse. History   Smoking Status    Current Every Day Smoker    Packs/day: 0.50    Years: 30.00    Types: Cigars   Smokeless Tobacco    Never Used     Comment: smoke cigars     Social History     Social History    Marital status:      Spouse name: N/A    Number of children: N/A    Years of education: N/A     Social History Main Topics    Smoking status: Current Every Day Smoker     Packs/day: 0.50     Years: 30.00     Types: Cigars    Smokeless tobacco: Never Used      Comment: smoke cigars    Alcohol use No    Drug use: No    Sexual activity: Yes     Partners: Female     Other Topics Concern    None     Social History Narrative    None       Patient's medications, allergies, past medical, surgical, social and family histories were reviewed and updated as appropriate. Review of Systems   Constitutional: Negative. Eyes: Negative. Negative for visual disturbance. Respiratory: Negative. Negative for shortness of breath. Cardiovascular: Negative. Negative for chest pain. Gastrointestinal: Negative. Negative for abdominal pain. Genitourinary: Negative. Musculoskeletal: Negative. Skin: Negative. Negative for color change. Neurological: Negative. Psychiatric/Behavioral: Negative. Negative for behavioral problems and hallucinations. All other systems reviewed and are negative. Objective:   Physical Exam   Constitutional: He is oriented to person, place, and time. He appears well-developed and well-nourished. HENT:   Head: Normocephalic and atraumatic.    Right Ear: Hearing, tympanic membrane, external ear and ear canal normal.   Left Ear: Hearing, tympanic membrane, external ear and ear canal normal.   Nose: Nose normal.   Eyes: Pupils are

## 2018-10-08 ENCOUNTER — TELEPHONE (OUTPATIENT)
Dept: ADMINISTRATIVE | Age: 58
End: 2018-10-08

## 2018-10-08 ENCOUNTER — TELEPHONE (OUTPATIENT)
Dept: ENT CLINIC | Age: 58
End: 2018-10-08

## 2018-10-15 ENCOUNTER — HOSPITAL ENCOUNTER (OUTPATIENT)
Dept: RADIATION ONCOLOGY | Age: 58
Discharge: HOME | End: 2018-10-15
Payer: COMMERCIAL

## 2018-10-15 VITALS
DIASTOLIC BLOOD PRESSURE: 77 MMHG | HEART RATE: 66 BPM | SYSTOLIC BLOOD PRESSURE: 112 MMHG | BODY MASS INDEX: 25.55 KG/M2 | WEIGHT: 192.8 LBS | TEMPERATURE: 98.1 F | HEIGHT: 73 IN

## 2018-10-15 DIAGNOSIS — I10 ESSENTIAL HYPERTENSION, BENIGN: Chronic | ICD-10-CM

## 2018-10-15 DIAGNOSIS — C32.0 SQUAMOUS CELL CARCINOMA OF LEFT VOCAL CORD (HCC): Primary | ICD-10-CM

## 2018-10-15 PROCEDURE — 99205 OFFICE O/P NEW HI 60 MIN: CPT

## 2018-10-15 PROCEDURE — 99245 OFF/OP CONSLTJ NEW/EST HI 55: CPT | Performed by: RADIOLOGY

## 2018-10-15 NOTE — PROGRESS NOTES
Radiation Oncology Consultation               Referring Physician: Katelyn Adamson MD      Primary Care Physician:Ranjan Ellison MD   Primary Oncologist: Katelyn Adamson M.D. 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 This was not associated with any left-sided earache nor any odynophagia or dysphagia. Patient was referred by his primary care physician Guero Larry M.D. to Dr. Katelyn Adamson M.D. the ENT service. During an in office examination on 9/20/2018, Dr. Ghanshyam Cai performed indirect fiber-optic laryngoscopy revealing no bulging abnormality involving the left true vocal cord which was somewhat limited in mobility without being truly paralyzed. The note is accompanied by detailed pictures and images which clearly show the lesion involving the anterior two thirds of the left true vocal cord with extension into the anterior commissure and to a less minimal extent anterior aspect of the contralateral right true vocal cord. On 9/28/2018 patient underwent an exam under anesthesia and pan endoscopy. Bronchoscopy in the portion of the esophagus scoped were within normal limits. Presence of the left-sided true vocal cord lesion was confirmed. Biopsies were taken which revealed invasive moderately differentiated squamous cell carcinoma. There was no evidence of subglottic extension.  Options were discussed and patient is now referred to us for discussion of possible definitive external beam radiation therapy.    -----    Past

## 2018-10-17 ENCOUNTER — HOSPITAL ENCOUNTER (OUTPATIENT)
Dept: RADIATION ONCOLOGY | Age: 58
Discharge: HOME | End: 2018-10-17
Payer: COMMERCIAL

## 2018-10-17 ENCOUNTER — TELEPHONE (OUTPATIENT)
Dept: RADIATION ONCOLOGY | Age: 58
End: 2018-10-17

## 2018-10-17 PROCEDURE — 77290 THER RAD SIMULAJ FIELD CPLX: CPT

## 2018-10-17 PROCEDURE — 77280 THER RAD SIMULAJ FIELD SMPL: CPT

## 2018-10-17 PROCEDURE — 77334 RADIATION TREATMENT AID(S): CPT

## 2018-10-18 ENCOUNTER — HOSPITAL ENCOUNTER (OUTPATIENT)
Dept: RADIATION ONCOLOGY | Age: 58
Discharge: HOME | End: 2018-10-18
Payer: COMMERCIAL

## 2018-10-18 PROCEDURE — 77332 RADIATION TREATMENT AID(S): CPT

## 2018-10-18 PROCEDURE — 77306 TELETHX ISODOSE PLAN SIMPLE: CPT

## 2018-10-19 ENCOUNTER — HOSPITAL ENCOUNTER (OUTPATIENT)
Age: 58
Discharge: HOME | End: 2018-10-19
Payer: COMMERCIAL

## 2018-10-19 DIAGNOSIS — C32.0 SQUAMOUS CELL CARCINOMA OF LEFT VOCAL CORD (HCC): ICD-10-CM

## 2018-10-19 DIAGNOSIS — I10 ESSENTIAL HYPERTENSION, BENIGN: Chronic | ICD-10-CM

## 2018-10-19 LAB — TSH SERPL DL<=0.05 MIU/L-ACNC: 1.09 UIU/ML (ref 0.27–4.2)

## 2018-10-19 PROCEDURE — 84443 ASSAY THYROID STIM HORMONE: CPT

## 2018-10-19 PROCEDURE — 36415 COLL VENOUS BLD VENIPUNCTURE: CPT

## 2018-10-20 ENCOUNTER — HOSPITAL ENCOUNTER (OUTPATIENT)
Dept: CT IMAGING | Age: 58
Discharge: HOME | End: 2018-10-22
Payer: COMMERCIAL

## 2018-10-20 DIAGNOSIS — C32.0 SQUAMOUS CELL CARCINOMA OF LEFT VOCAL CORD (HCC): ICD-10-CM

## 2018-10-20 PROCEDURE — 71260 CT THORAX DX C+: CPT

## 2018-10-20 PROCEDURE — 6360000004 HC RX CONTRAST MEDICATION: Performed by: RADIOLOGY

## 2018-10-20 RX ADMIN — IOPAMIDOL 80 ML: 755 INJECTION, SOLUTION INTRAVENOUS at 15:43

## 2018-10-22 ENCOUNTER — PRE-PROCEDURE TELEPHONE (OUTPATIENT)
Dept: RADIATION ONCOLOGY | Age: 58
End: 2018-10-22

## 2018-10-22 PROCEDURE — 77412 RADIATION TX DELIVERY LVL 3: CPT

## 2018-10-22 PROCEDURE — 77387 GUIDANCE FOR RADJ TX DLVR: CPT

## 2018-10-22 PROCEDURE — 77402 RADIATION TX DELIVERY LVL 1: CPT

## 2018-10-23 ENCOUNTER — HOSPITAL ENCOUNTER (OUTPATIENT)
Dept: RADIATION ONCOLOGY | Age: 58
Discharge: HOME | End: 2018-10-23
Payer: COMMERCIAL

## 2018-10-23 VITALS
BODY MASS INDEX: 25.6 KG/M2 | DIASTOLIC BLOOD PRESSURE: 78 MMHG | WEIGHT: 194 LBS | SYSTOLIC BLOOD PRESSURE: 122 MMHG | HEART RATE: 71 BPM

## 2018-10-23 PROCEDURE — 77402 RADIATION TX DELIVERY LVL 1: CPT

## 2018-10-23 PROCEDURE — 77412 RADIATION TX DELIVERY LVL 3: CPT

## 2018-10-23 PROCEDURE — 77387 GUIDANCE FOR RADJ TX DLVR: CPT

## 2018-10-24 PROCEDURE — 77402 RADIATION TX DELIVERY LVL 1: CPT

## 2018-10-24 PROCEDURE — 77412 RADIATION TX DELIVERY LVL 3: CPT

## 2018-10-24 PROCEDURE — 77387 GUIDANCE FOR RADJ TX DLVR: CPT

## 2018-10-25 PROCEDURE — 77412 RADIATION TX DELIVERY LVL 3: CPT

## 2018-10-25 PROCEDURE — 77387 GUIDANCE FOR RADJ TX DLVR: CPT

## 2018-10-25 PROCEDURE — 77402 RADIATION TX DELIVERY LVL 1: CPT

## 2018-10-25 NOTE — PROGRESS NOTES
Brittany Rodriguezcelena  10/25/2018  Wt Readings from Last 10 Encounters:   10/23/18 194 lb (88 kg)   10/15/18 192 lb 12.8 oz (87.5 kg)   10/05/18 194 lb (88 kg)   09/26/18 189 lb (85.7 kg)   09/21/18 182 lb (82.6 kg)   09/20/18 185 lb (83.9 kg)   09/10/18 189 lb (85.7 kg)   03/26/18 202 lb (91.6 kg)   01/12/18 202 lb (91.6 kg)   12/08/16 194 lb (88 kg)     Ht Readings from Last 1 Encounters:   10/15/18 6' 1\" (1.854 m)     Met with pt today for initial visit and introductions. Pt has completed 4/28 RT directed to the larynx. Pt reported that he consumes 1 large meal and 2 snacks daily. Discussed increased nutrition needs and the importance of wt maintenance during treatment. Pt also reported that he drinks 3-4 bottles of water in addition to a couple glasses of both water and coffee daily. Encouraged pt to continue to consume adequate fluid. Provided pt with handout and discussed possible nutrition related side effects of treatment, particularly throat soreness and loss of appetite. Also discussed tips to help cope with these side effects. Pt was receptive of information. Pt declined ONS coupons at this time but is aware that he can pick some up if he would like. All questions were answered and will continue to follow. Weight change: Pt has maintained a stable wt for the past month   Appetite: good  N/V/D/C: none   Calculated Needs if applicable: 6262-1224 kcal (30-32 kcal/kg), 115-130 gm (1.3-1.5 gm/kg), 1439-5992 mL (30-32 mL/kg)    Pre-Hab Eligible?: no     Recommendations: Pt is to consume at least 3 meals daily.      [employer] GUIDELINES FOR CLINICAL CHARACTERISTICS OF MALNUTRITION IN CHRONIC ILLNESS     Moderate Malnutrition  Severe Malnutrition    Energy intake  <75% energy intake compared to estimated needs for >1month <75% energy intake compared to estimated needs for >1month   Weight changes  5% x 1 month  7.5% x 3 months   10% x 6 months   20% x 1 year  >5% x 1 month  >7.5% x 3 months   >10% x 6 months   >20%

## 2018-10-26 PROCEDURE — 77387 GUIDANCE FOR RADJ TX DLVR: CPT

## 2018-10-26 PROCEDURE — 77336 RADIATION PHYSICS CONSULT: CPT

## 2018-10-26 PROCEDURE — 77402 RADIATION TX DELIVERY LVL 1: CPT

## 2018-10-29 PROCEDURE — 77402 RADIATION TX DELIVERY LVL 1: CPT

## 2018-10-29 PROCEDURE — 77387 GUIDANCE FOR RADJ TX DLVR: CPT

## 2018-10-29 PROCEDURE — 77412 RADIATION TX DELIVERY LVL 3: CPT

## 2018-10-30 ENCOUNTER — HOSPITAL ENCOUNTER (OUTPATIENT)
Dept: RADIATION ONCOLOGY | Age: 58
Discharge: HOME | End: 2018-10-30
Payer: COMMERCIAL

## 2018-10-30 VITALS
HEART RATE: 69 BPM | SYSTOLIC BLOOD PRESSURE: 120 MMHG | BODY MASS INDEX: 25.75 KG/M2 | WEIGHT: 195.2 LBS | DIASTOLIC BLOOD PRESSURE: 88 MMHG

## 2018-10-30 DIAGNOSIS — C32.0 SQUAMOUS CELL CARCINOMA OF LEFT VOCAL CORD (HCC): Primary | ICD-10-CM

## 2018-10-30 PROCEDURE — 77412 RADIATION TX DELIVERY LVL 3: CPT

## 2018-10-30 PROCEDURE — 77387 GUIDANCE FOR RADJ TX DLVR: CPT

## 2018-10-30 PROCEDURE — 77402 RADIATION TX DELIVERY LVL 1: CPT

## 2018-10-30 NOTE — PROGRESS NOTES
Brian Keenan  10/30/2018  9:03 AM             Wt Readings from Last 3 Encounters:   10/30/18 195 lb 3.2 oz (88.5 kg)   10/23/18 194 lb (88 kg)   10/15/18 192 lb 12.8 oz (87.5 kg)       Subjective: @15.75 out of 63,   Starting to get mild odynophagia, reports increased hoarsenesss      Objective:   General; pt is indeed more dysphonic than last wekk  Skin;   Increased dermatitis and epilation in the portal  HEENT;   + pobsi inf pharyngeal wall hyperemai9 min0      Assessment: Tolerating Rt w increased dsyphonai from Rt      Plan: cont current mgt, cont skin care and covering the neck with scarf,  Will Rx BMX,   Assured pt that increased dysphonia is NOT from tumor progression      Electronically signed by Tori Hernandez MD on 10/30/18 at 9:03 AM

## 2018-10-31 PROCEDURE — 77387 GUIDANCE FOR RADJ TX DLVR: CPT

## 2018-10-31 PROCEDURE — 77402 RADIATION TX DELIVERY LVL 1: CPT

## 2018-11-01 ENCOUNTER — HOSPITAL ENCOUNTER (OUTPATIENT)
Dept: RADIATION ONCOLOGY | Age: 58
Discharge: HOME | End: 2018-11-01
Payer: COMMERCIAL

## 2018-11-01 PROCEDURE — 77387 GUIDANCE FOR RADJ TX DLVR: CPT

## 2018-11-01 PROCEDURE — 77402 RADIATION TX DELIVERY LVL 1: CPT

## 2018-11-02 PROCEDURE — 77402 RADIATION TX DELIVERY LVL 1: CPT

## 2018-11-02 PROCEDURE — 77336 RADIATION PHYSICS CONSULT: CPT

## 2018-11-02 PROCEDURE — 77387 GUIDANCE FOR RADJ TX DLVR: CPT

## 2018-11-05 PROCEDURE — 77402 RADIATION TX DELIVERY LVL 1: CPT

## 2018-11-05 PROCEDURE — 77387 GUIDANCE FOR RADJ TX DLVR: CPT

## 2018-11-06 ENCOUNTER — HOSPITAL ENCOUNTER (OUTPATIENT)
Dept: RADIATION ONCOLOGY | Age: 58
Discharge: HOME | End: 2018-11-06
Payer: COMMERCIAL

## 2018-11-06 ENCOUNTER — OFFICE VISIT (OUTPATIENT)
Dept: ENT CLINIC | Age: 58
End: 2018-11-06
Payer: COMMERCIAL

## 2018-11-06 VITALS
SYSTOLIC BLOOD PRESSURE: 119 MMHG | OXYGEN SATURATION: 98 % | HEIGHT: 73 IN | DIASTOLIC BLOOD PRESSURE: 89 MMHG | WEIGHT: 195 LBS | HEART RATE: 61 BPM | BODY MASS INDEX: 25.84 KG/M2

## 2018-11-06 VITALS
SYSTOLIC BLOOD PRESSURE: 122 MMHG | HEART RATE: 62 BPM | DIASTOLIC BLOOD PRESSURE: 85 MMHG | WEIGHT: 195 LBS | BODY MASS INDEX: 25.73 KG/M2

## 2018-11-06 DIAGNOSIS — J38.3 LESION OF VOCAL CORD: ICD-10-CM

## 2018-11-06 DIAGNOSIS — C32.0 CANCER OF GLOTTIS (HCC): Primary | ICD-10-CM

## 2018-11-06 DIAGNOSIS — C32.0 SQUAMOUS CELL CARCINOMA OF LEFT VOCAL CORD (HCC): ICD-10-CM

## 2018-11-06 DIAGNOSIS — C32.0 SQUAMOUS CELL CARCINOMA OF LEFT VOCAL CORD (HCC): Primary | ICD-10-CM

## 2018-11-06 PROCEDURE — 31575 DIAGNOSTIC LARYNGOSCOPY: CPT | Performed by: OTOLARYNGOLOGY

## 2018-11-06 PROCEDURE — 77387 GUIDANCE FOR RADJ TX DLVR: CPT

## 2018-11-06 PROCEDURE — 77402 RADIATION TX DELIVERY LVL 1: CPT

## 2018-11-06 PROCEDURE — 99213 OFFICE O/P EST LOW 20 MIN: CPT | Performed by: OTOLARYNGOLOGY

## 2018-11-06 RX ORDER — OXYCODONE HYDROCHLORIDE 5 MG/1
5 CAPSULE ORAL EVERY 6 HOURS PRN
Qty: 60 CAPSULE | Refills: 0 | Status: SHIPPED | OUTPATIENT
Start: 2018-11-06 | End: 2018-11-21

## 2018-11-06 RX ORDER — OXYCODONE HYDROCHLORIDE 5 MG/1
5 CAPSULE ORAL EVERY 6 HOURS PRN
Qty: 60 CAPSULE | Refills: 0 | Status: SHIPPED | OUTPATIENT
Start: 2018-11-06 | End: 2018-11-06 | Stop reason: SDUPTHER

## 2018-11-06 RX ORDER — DIPHENHYDRAMINE HYDROCHLORIDE AND LIDOCAINE HYDROCHLORIDE AND ALUMINUM HYDROXIDE AND MAGNESIUM HYDRO
KIT
Refills: 5 | COMMUNITY
Start: 2018-10-30 | End: 2018-12-26

## 2018-11-06 RX ORDER — GINSENG 100 MG
CAPSULE ORAL
Qty: 1 TUBE | Refills: 3 | Status: SHIPPED | OUTPATIENT
Start: 2018-11-06 | End: 2018-11-16

## 2018-11-06 RX ORDER — OXYCODONE HYDROCHLORIDE 5 MG/1
5 CAPSULE ORAL EVERY 6 HOURS PRN
Qty: 60 CAPSULE | Refills: 0 | Status: SHIPPED | OUTPATIENT
Start: 2018-11-06 | End: 2018-11-06

## 2018-11-06 ASSESSMENT — ENCOUNTER SYMPTOMS
EYES NEGATIVE: 1
SHORTNESS OF BREATH: 0
ABDOMINAL PAIN: 0
COLOR CHANGE: 0
GASTROINTESTINAL NEGATIVE: 1
RESPIRATORY NEGATIVE: 1

## 2018-11-06 NOTE — PROGRESS NOTES
Jose Monroe  11/6/2018  9:28 AM        Wt Readings from Last 3 Encounters:   11/06/18 195 lb (88.5 kg)   10/30/18 195 lb 3.2 oz (88.5 kg)   10/23/18 194 lb (88 kg)       Subjective: @ 27 gy out of planned 63 Gy to the glottic larynx, doing moderately well, but complaining of increased odynophagia while managing to maintain his nutritional intake, continues to use a moiturizer gels and wears a scar around his neck, reports that he is also noticing thickening of salivary secretions, reports that he has today for the 1st time he actually had significantly diminished dysphonia. , but for more hoarse today      Objective:   HEENT:    + thickened secretions, no post pharyngeal wall hyperemia  Neck;  good laryngeal click  Ski; + epilation and erythema + HP in the RT portal      Assessment: john Rt well w/ expected toxicity      Plan: cont RT, encouraged pt to commence use of BMX, wRxed oxycodone 5mg PO Q 6 hrs PRN via RSA soft token to the pt's pharmacy number [id number]      Electronically signed by Ian Collado MD on 11/6/18 at 9:28 AM

## 2018-11-06 NOTE — PROGRESS NOTES
Condition:  Stable    Complications:  None    Pictures:                            Assessment:       Diagnosis Orders   1. Cancer of glottis ([address]     2. 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 Lesion of vocal cord                Plan:      I would like to continue to watch the lesion for now he is already getting treatment to that area and may be gone at the end of the treatment. But is a biopsy is necessary I will proceed    I will also give bacitracin for the eye.        Follow up in 1 month(s)

## 2018-11-07 PROCEDURE — 77387 GUIDANCE FOR RADJ TX DLVR: CPT

## 2018-11-07 PROCEDURE — 77402 RADIATION TX DELIVERY LVL 1: CPT

## 2018-11-08 PROBLEM — J38.3 LESION OF TRUE VOCAL CORD: Status: ACTIVE | Noted: 2018-09-21

## 2018-11-08 PROCEDURE — 77387 GUIDANCE FOR RADJ TX DLVR: CPT

## 2018-11-08 PROCEDURE — 77402 RADIATION TX DELIVERY LVL 1: CPT

## 2018-11-09 PROCEDURE — 77387 GUIDANCE FOR RADJ TX DLVR: CPT

## 2018-11-09 PROCEDURE — 77402 RADIATION TX DELIVERY LVL 1: CPT

## 2018-11-09 PROCEDURE — 77336 RADIATION PHYSICS CONSULT: CPT

## 2018-11-12 PROCEDURE — 77402 RADIATION TX DELIVERY LVL 1: CPT

## 2018-11-12 PROCEDURE — 77386 HC NTSTY MODUL RAD TX DLVR CPLX: CPT

## 2018-11-12 PROCEDURE — 77387 GUIDANCE FOR RADJ TX DLVR: CPT

## 2018-11-13 ENCOUNTER — HOSPITAL ENCOUNTER (OUTPATIENT)
Dept: RADIATION ONCOLOGY | Age: 58
Discharge: HOME | End: 2018-11-13
Payer: COMMERCIAL

## 2018-11-13 VITALS
SYSTOLIC BLOOD PRESSURE: 126 MMHG | BODY MASS INDEX: 26.49 KG/M2 | WEIGHT: 200.8 LBS | HEART RATE: 65 BPM | DIASTOLIC BLOOD PRESSURE: 83 MMHG

## 2018-11-13 PROCEDURE — 77387 GUIDANCE FOR RADJ TX DLVR: CPT

## 2018-11-13 PROCEDURE — 77402 RADIATION TX DELIVERY LVL 1: CPT

## 2018-11-13 RX ORDER — OXYCODONE HYDROCHLORIDE 5 MG/1
TABLET ORAL
Refills: 0 | COMMUNITY
Start: 2018-11-07 | End: 2018-12-26

## 2018-11-14 PROCEDURE — 77387 GUIDANCE FOR RADJ TX DLVR: CPT

## 2018-11-14 PROCEDURE — 77402 RADIATION TX DELIVERY LVL 1: CPT

## 2018-11-14 PROCEDURE — 77412 RADIATION TX DELIVERY LVL 3: CPT

## 2018-11-15 PROCEDURE — 77402 RADIATION TX DELIVERY LVL 1: CPT

## 2018-11-15 PROCEDURE — 77387 GUIDANCE FOR RADJ TX DLVR: CPT

## 2018-11-15 PROCEDURE — 77412 RADIATION TX DELIVERY LVL 3: CPT

## 2018-11-16 PROCEDURE — 77402 RADIATION TX DELIVERY LVL 1: CPT

## 2018-11-16 PROCEDURE — 77336 RADIATION PHYSICS CONSULT: CPT

## 2018-11-16 PROCEDURE — 77387 GUIDANCE FOR RADJ TX DLVR: CPT

## 2018-11-16 PROCEDURE — 77412 RADIATION TX DELIVERY LVL 3: CPT

## 2018-11-18 PROCEDURE — 77402 RADIATION TX DELIVERY LVL 1: CPT

## 2018-11-18 PROCEDURE — 77387 GUIDANCE FOR RADJ TX DLVR: CPT

## 2018-11-18 PROCEDURE — 77412 RADIATION TX DELIVERY LVL 3: CPT

## 2018-11-19 PROCEDURE — 77402 RADIATION TX DELIVERY LVL 1: CPT

## 2018-11-19 PROCEDURE — 77412 RADIATION TX DELIVERY LVL 3: CPT

## 2018-11-19 PROCEDURE — 77387 GUIDANCE FOR RADJ TX DLVR: CPT

## 2018-11-20 ENCOUNTER — HOSPITAL ENCOUNTER (OUTPATIENT)
Dept: RADIATION ONCOLOGY | Age: 58
Discharge: HOME | End: 2018-11-20
Payer: COMMERCIAL

## 2018-11-20 VITALS
SYSTOLIC BLOOD PRESSURE: 120 MMHG | WEIGHT: 199 LBS | DIASTOLIC BLOOD PRESSURE: 84 MMHG | HEART RATE: 62 BPM | BODY MASS INDEX: 26.25 KG/M2

## 2018-11-20 DIAGNOSIS — C32.0 SQUAMOUS CELL CARCINOMA OF LEFT VOCAL CORD (HCC): Primary | ICD-10-CM

## 2018-11-20 PROCEDURE — 77402 RADIATION TX DELIVERY LVL 1: CPT

## 2018-11-20 PROCEDURE — 77387 GUIDANCE FOR RADJ TX DLVR: CPT

## 2018-11-21 PROCEDURE — 77402 RADIATION TX DELIVERY LVL 1: CPT

## 2018-11-21 PROCEDURE — 77387 GUIDANCE FOR RADJ TX DLVR: CPT

## 2018-11-26 PROCEDURE — 77336 RADIATION PHYSICS CONSULT: CPT

## 2018-11-26 PROCEDURE — 77402 RADIATION TX DELIVERY LVL 1: CPT

## 2018-11-26 PROCEDURE — 77387 GUIDANCE FOR RADJ TX DLVR: CPT

## 2018-11-26 PROCEDURE — 77412 RADIATION TX DELIVERY LVL 3: CPT

## 2018-11-26 NOTE — PROGRESS NOTES
DEPARTMENT OF RADIATION ONCOLOGY   ON TREATMENT VISIT       11/26/2018      NAME:  Ivy Mtz    YOB: 1960    Diagnosis: Invasive moderately differentiated squamous cell carcinoma involving the anterior one third of the left true vocal cord and anterior commissure, stage I T1b N0 M0.     SUBJECTIVE:   Ivy Mtz has now received 5625 cGy in 25 fractions directed to the glottis + margin. Patient seen after today's treatment. Patient's wife Dheeraj President present in exam room. Patient complains of skin irritation. Patient utilizing Silvadene topical ointment also applying Aquaphor ointment. Wife inquiring about open area to mid anterior neck, she is asking if site \"looks infected\". Patient continues to use Baking soda and saltwater rinses. + Odynophagia controlled with Magic mouthwash. Patient eating solid foods and thin liquids without dysphagia. Patient follows with:  Primary Oncologist Dr. Mamadou Stringer   Otolaryngologist Dr. Lea Donovan. Shiv Carmen Physician Dr. Patrica Snyder    Past medical, surgical, social and family histories reviewed and updated as indicated. Pain: controlled. ALLERGIES:  Patient has no known allergies. Current Outpatient Prescriptions   Medication Sig Dispense Refill    silver sulfADIAZINE (SSD) 1 % cream Apply to affected area of the anterior neck BID 50 g 1    oxyCODONE (ROXICODONE) 5 MG immediate release tablet TAKE 1 TABLET BY MOUTH EVERY 6 HOURS AS NEEDED FOR PAIN  0    DPH-Lido-AlHydr-MgHydr-Simeth (MAGIC MOUTHWASH) SUSP SWISH AND SWALLOW 10 MLS 4 TIMES DAILY AS NEEDED FOR IRRITATION OR PAIN  5    loratadine (CLARITIN) 10 MG tablet TAKE 1 TABLET BY MOUTH ONCE A DAY 90 tablet 1    carvedilol (COREG) 12.5 MG tablet TAKE 1 TABLET BY MOUTH TWICE A  tablet 1    aspirin 81 MG tablet Take 81 mg by mouth daily. No current facility-administered medications for this encounter.         OBJECTIVE:     Wt Readings from Last 3

## 2018-11-27 PROCEDURE — 77387 GUIDANCE FOR RADJ TX DLVR: CPT

## 2018-11-27 PROCEDURE — 77402 RADIATION TX DELIVERY LVL 1: CPT

## 2018-11-28 PROCEDURE — 77402 RADIATION TX DELIVERY LVL 1: CPT

## 2018-11-28 PROCEDURE — 77417 THER RADIOLOGY PORT IMAGE(S): CPT

## 2018-11-29 ENCOUNTER — HOSPITAL ENCOUNTER (OUTPATIENT)
Dept: RADIATION ONCOLOGY | Age: 58
Discharge: HOME | End: 2018-11-29
Payer: COMMERCIAL

## 2018-11-29 VITALS — SYSTOLIC BLOOD PRESSURE: 122 MMHG | DIASTOLIC BLOOD PRESSURE: 86 MMHG | BODY MASS INDEX: 26.52 KG/M2 | WEIGHT: 201 LBS

## 2018-11-29 PROCEDURE — 77336 RADIATION PHYSICS CONSULT: CPT | Performed by: RADIOLOGY

## 2018-11-29 PROCEDURE — 77402 RADIATION TX DELIVERY LVL 1: CPT

## 2018-11-29 PROCEDURE — 77387 GUIDANCE FOR RADJ TX DLVR: CPT

## 2018-11-29 NOTE — PROGRESS NOTES
[facility]  11/29/2018  9:08 AM        Wt Readings from Last 3 Encounters:   11/29/18 201 lb (91.2 kg)   11/20/18 199 lb (90.3 kg)   11/13/18 200 lb 12.8 oz (91.1 kg)       Subjective: @63 Gy to the glottic larynx, expected toxicity w dysphonia, dysphagia,  And odynophagia,  But doing well      Objective:   Skin;  Epilation brisk GII dermatitis  HEENT; + post pharyngeal wall hyperemia      Assessment: john Rt well, completed today      Plan: completed RT today, continue care instructions during the recovery phase,    RTC in 4-6 wks to see Vidya Suarez, VANI,  FU w Dr. Kortney Proctor      Electronically signed by Taylor Brown MD on 11/29/18 at 9:08 AM

## 2018-11-29 NOTE — PROGRESS NOTES
Brian Keenan  11/29/2018  8:01 AM          Current Outpatient Prescriptions   Medication Sig Dispense Refill    silver sulfADIAZINE (SSD) 1 % cream Apply to affected area of the anterior neck BID 50 g 1    oxyCODONE (ROXICODONE) 5 MG immediate release tablet TAKE 1 TABLET BY MOUTH EVERY 6 HOURS AS NEEDED FOR PAIN  0    DPH-Lido-AlHydr-MgHydr-Simeth (MAGIC MOUTHWASH) SUSP SWISH AND SWALLOW 10 MLS 4 TIMES DAILY AS NEEDED FOR IRRITATION OR PAIN  5    loratadine (CLARITIN) 10 MG tablet TAKE 1 TABLET BY MOUTH ONCE A DAY 90 tablet 1    carvedilol (COREG) 12.5 MG tablet TAKE 1 TABLET BY MOUTH TWICE A  tablet 1    aspirin 81 MG tablet Take 81 mg by mouth daily. No current facility-administered medications for this encounter. This is an up-to-date medication list.    Please take this list to your next care provider, and discard any previous medication lists.

## 2018-12-05 ENCOUNTER — TELEPHONE (OUTPATIENT)
Dept: RADIATION ONCOLOGY | Age: 58
End: 2018-12-05

## 2018-12-11 ENCOUNTER — OFFICE VISIT (OUTPATIENT)
Dept: ENT CLINIC | Age: 58
End: 2018-12-11
Payer: COMMERCIAL

## 2018-12-11 VITALS
WEIGHT: 200 LBS | DIASTOLIC BLOOD PRESSURE: 83 MMHG | HEIGHT: 73 IN | BODY MASS INDEX: 26.51 KG/M2 | SYSTOLIC BLOOD PRESSURE: 115 MMHG | HEART RATE: 64 BPM | OXYGEN SATURATION: 95 %

## 2018-12-11 DIAGNOSIS — C32.0 CANCER OF GLOTTIS (HCC): Primary | ICD-10-CM

## 2018-12-11 DIAGNOSIS — Z92.3 HISTORY OF RADIATION TO HEAD AND NECK REGION: ICD-10-CM

## 2018-12-11 PROCEDURE — 99213 OFFICE O/P EST LOW 20 MIN: CPT | Performed by: OTOLARYNGOLOGY

## 2018-12-11 NOTE — PROGRESS NOTES
Department of Otolaryngology  Office Consult Note    Subjective:      Patient ID: Billy Campbell is a 60 y.o. male. HPI: Patient presents as follow-up for Recheck of left true vocal cord squamous cell carcinoma. The patient completed radiation therapy November 30, 2018. The patient is doing well, denies any dysphagia or difficulty eating. He denies any dyspnea. He reports that he is actually gained weight. Denies any new lumps or bumps to the head or neck. Reports excessive mucous production and coughing. Patient is still hoarse, but this is unchanged. Review of Systems   Constitutional:Negative. HENT: Positive for dysphagia and hoarseness. Eyes: Negative. Respiratory: positive for cough. Cardiovascular: Negative. Gastrointestinal: Negative. Genitourinary: Negative. Musculoskeletal: Negative. Neurological: Negative. Objective:   /83 (Site: Right Upper Arm, Position: Sitting, Cuff Size: Large Adult)   Pulse 64   Ht 6' 1\" (1.854 m)   Wt 200 lb (90.7 kg)   SpO2 95%   BMI 26.39 kg/m²     Physical Exam   Constitutional: He is oriented to person, place, and time. No distress. HENT:   Head: Normocephalic and atraumatic. Right Ear: Tympanic membrane and external ear normal.   Left Ear: Tympanic membrane and external ear normal.   Mouth/Throat: Oropharynx is clear and moist. Mucous membranes are pale. No oropharyngeal exudate. Patient is hoarse. Eyes: Pupils are equal, round, and reactive to light. Conjunctivae and EOM are normal.   Neck: Normal range of motion. Neck supple. Pulmonary/Chest: Effort normal. No respiratory distress. Abdominal: He exhibits no distension. Musculoskeletal: Normal range of motion. Lymphadenopathy:     He has no cervical adenopathy. Neurological: He is alert and oriented to person, place, and time. Skin: Skin is warm and dry. He is not diaphoretic. Assessment:       Diagnosis Orders   1.  Cancer of glottis ([address]  PET CT TRUNK IMAGING SUBSEQ   2. History of radiation to head and neck region             Plan:      The patient is doing well from an ENT standpoint. From ENT standpoint he may resume light work. Encouraged hydration to help thin mucus production. We will see the patient back in 3 months for surveillance scope. We will order a PET scan for that time. Electronically signed by Evi Ortiz DO on 12/11/18 at 10:00 AM        [facility]  1960    I have discussed the case, including pertinent history and exam findings with the resident. I have seen and examined the patient and the key elements of the encounter have been performed by me. I agree with the assessment, plan and orders as documented by the  resident    Patient is here to establish care as a established patient in the clinic. Remainder of medical problems as per  resident note. Patient seen and examined. Agree with above exam, assessment and plan.       Electronically signed by Violette Nance DO on 12/13/18 at 12:45 PM

## 2018-12-26 ENCOUNTER — HOSPITAL ENCOUNTER (OUTPATIENT)
Dept: RADIATION ONCOLOGY | Age: 58
Discharge: HOME | End: 2018-12-26
Payer: COMMERCIAL

## 2018-12-26 VITALS
BODY MASS INDEX: 27.46 KG/M2 | SYSTOLIC BLOOD PRESSURE: 112 MMHG | WEIGHT: 208.13 LBS | DIASTOLIC BLOOD PRESSURE: 87 MMHG | RESPIRATION RATE: 18 BRPM | HEART RATE: 67 BPM | TEMPERATURE: 97.9 F

## 2018-12-26 DIAGNOSIS — C32.0 SQUAMOUS CELL CARCINOMA OF LEFT VOCAL CORD (HCC): Primary | ICD-10-CM

## 2018-12-26 PROCEDURE — 99999 PR OFFICE/OUTPT VISIT,PROCEDURE ONLY: CPT | Performed by: NURSE PRACTITIONER

## 2018-12-26 NOTE — PROGRESS NOTES
RADIATION ONCOLOGY  4 week follow up         12/26/2018      NAME:  Brittany Key    YOB: 1960    CC: Pt returns today for re-assessment of radiation treatment area. Diagnosis:  Invasive moderately differentiated squamous cell carcinoma involving the anterior one third of the left true vocal cord and anterior commissure, stage I T1b N0 M0. Subjective:  On 11/29/2018, Brittany Key completed 6300 cGy in 28 fractions directed to the glottis. Seen today in 4 week follow-up. Patient denies skin complaints. Patient denies oral sores, oral pain or sore throat. Denies dysgeusia, xerostomia or dysphagia. Patient reports recent ear infection, right ear. Went to [facility] took antibiotic x 1 week. Right ear pain resolved. No hearing loss, fevers or chills. Patient follows with Dr. Bharat Jones, Otolaryngology. Next appointment March 2019. Patient reports planned to have PET scan and fibroptic exam in March 2019. Pain: No pain complaints. Past medical, surgical, social and family histories reviewed and updated as indicated. ALLERGIES:  Patient has no known allergies. Current Outpatient Prescriptions   Medication Sig Dispense Refill    loratadine (CLARITIN) 10 MG tablet TAKE 1 TABLET BY MOUTH ONCE A DAY 90 tablet 1    carvedilol (COREG) 12.5 MG tablet TAKE 1 TABLET BY MOUTH TWICE A  tablet 1    aspirin 81 MG tablet Take 81 mg by mouth daily. No current facility-administered medications for this encounter. Physical Examination:   Vitals:    12/26/18 0821   BP: 112/87   Pulse: 67   Resp: 18   Temp: 97.9 °F (36.6 °C)       Wt Readings from Last 3 Encounters:   12/26/18 208 lb 2 oz (94.4 kg)   12/11/18 200 lb (90.7 kg)   11/29/18 201 lb (91.2 kg)       Alert and fully ambulatory. Pleasant and conversant. Irradiated skin intact with no changes. Posterior pharyngeal with few areas of resolving erythema.      ASSESSMENT/PLAN:     Patient is

## 2019-01-03 ENCOUNTER — TELEPHONE (OUTPATIENT)
Dept: RADIATION ONCOLOGY | Age: 59
End: 2019-01-03

## 2019-02-27 RX ORDER — CARVEDILOL 12.5 MG/1
TABLET ORAL
Qty: 180 TABLET | Refills: 1 | Status: SHIPPED | OUTPATIENT
Start: 2019-02-27 | End: 2019-03-01 | Stop reason: SDUPTHER

## 2019-03-01 RX ORDER — CARVEDILOL 12.5 MG/1
TABLET ORAL
Qty: 180 TABLET | Refills: 1 | Status: SHIPPED
Start: 2019-03-01 | End: 2020-03-16

## 2019-03-07 ENCOUNTER — TELEPHONE (OUTPATIENT)
Dept: FAMILY MEDICINE CLINIC | Age: 59
End: 2019-03-07

## 2019-03-07 ENCOUNTER — HOSPITAL ENCOUNTER (OUTPATIENT)
Dept: PET IMAGING | Age: 59
Discharge: HOME | End: 2019-03-09
Payer: COMMERCIAL

## 2019-03-07 ENCOUNTER — TELEPHONE (OUTPATIENT)
Dept: ENT CLINIC | Age: 59
End: 2019-03-07

## 2019-03-07 DIAGNOSIS — C32.0 CANCER OF GLOTTIS (HCC): ICD-10-CM

## 2019-03-07 LAB — METER GLUCOSE: 91 MG/DL (ref 74–99)

## 2019-03-07 PROCEDURE — 3430000000 HC RX DIAGNOSTIC RADIOPHARMACEUTICAL: Performed by: RADIOLOGY

## 2019-03-07 PROCEDURE — 78815 PET IMAGE W/CT SKULL-THIGH: CPT

## 2019-03-07 PROCEDURE — 82962 GLUCOSE BLOOD TEST: CPT

## 2019-03-07 PROCEDURE — A9552 F18 FDG: HCPCS | Performed by: RADIOLOGY

## 2019-03-07 RX ORDER — FLUDEOXYGLUCOSE F 18 200 MCI/ML
15 INJECTION, SOLUTION INTRAVENOUS
Status: COMPLETED | OUTPATIENT
Start: 2019-03-07 | End: 2019-03-07

## 2019-03-07 RX ADMIN — FLUDEOXYGLUCOSE F 18 15 MILLICURIE: 200 INJECTION, SOLUTION INTRAVENOUS at 08:32

## 2019-03-11 ENCOUNTER — OFFICE VISIT (OUTPATIENT)
Dept: ENT CLINIC | Age: 59
End: 2019-03-11
Payer: COMMERCIAL

## 2019-03-11 VITALS
HEIGHT: 73 IN | WEIGHT: 207 LBS | DIASTOLIC BLOOD PRESSURE: 99 MMHG | SYSTOLIC BLOOD PRESSURE: 134 MMHG | HEART RATE: 63 BPM | OXYGEN SATURATION: 98 % | BODY MASS INDEX: 27.43 KG/M2

## 2019-03-11 DIAGNOSIS — C32.0 CANCER OF GLOTTIS (HCC): Primary | ICD-10-CM

## 2019-03-11 DIAGNOSIS — Z92.3 HISTORY OF RADIATION TO HEAD AND NECK REGION: ICD-10-CM

## 2019-03-11 PROCEDURE — 99213 OFFICE O/P EST LOW 20 MIN: CPT | Performed by: OTOLARYNGOLOGY

## 2019-03-11 PROCEDURE — 31575 DIAGNOSTIC LARYNGOSCOPY: CPT | Performed by: OTOLARYNGOLOGY

## 2019-05-14 ENCOUNTER — TELEPHONE (OUTPATIENT)
Dept: CASE MANAGEMENT | Age: 59
End: 2019-05-14

## 2019-05-14 ENCOUNTER — HOSPITAL ENCOUNTER (OUTPATIENT)
Dept: RADIATION ONCOLOGY | Age: 59
Discharge: HOME | End: 2019-05-14
Payer: COMMERCIAL

## 2019-05-14 DIAGNOSIS — I10 ESSENTIAL HYPERTENSION, BENIGN: Primary | Chronic | ICD-10-CM

## 2019-05-14 DIAGNOSIS — C32.9 LARYNX CANCER (HCC): ICD-10-CM

## 2019-05-14 PROCEDURE — 99214 OFFICE O/P EST MOD 30 MIN: CPT | Performed by: RADIOLOGY

## 2019-05-14 NOTE — PROGRESS NOTES
Radiation Oncology   Follow Up Note      Date of Service: 5/14/2019  Rubia Ivan    No chief complaint on file. Diagnosis: 60-year-old male  Invasive moderately differentiated squamous cell carcinoma involving the anterior one third of the left true vocal cord and anterior commissure, stage I T1b N0 M0, referred to us for consideration of definitive external beam radiation therapy, , completed 63 Charley Fleet in 28 divided fractions at 2.5 gray per fraction delivered to the entire glottis plus margin, treatment completed on 11/29/2018, patient now returning to our clinic for another follow-up posttreatment.           Interval History: Patient was last seen in my clinic for follow-up post radiation therapy, I might nurse practitioner partner Christiano Marrero. At that point in time patient was achieving slow by short recovery with some minimal residual odynophagia and dysphagia and partial dysphonia. Since then he's had complete resolution of the above-mentioned symptoms. His maintain close follow-up with his primary ENT surgeon and last saw Dr. Nicolle Bales on 3/11/2019 for an office visit. In office nasopharyngoscopy did not reveal any evidence of residual disease and patient has complete recovery from the hyperemia caused by radiation and there is no residual swelling along the vocal cords--pictures provided to Dr. Arnav Foster comprehensive note from the scoping procedure. It should be noted that, unfortunately, patient admits to smoking small cigars at least 2 possibly 3 times a month. He denies any recent dysphonia, but clearly on 3/11/2019 when he saw his ENT surgeon patient was dysphonic.  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[address]     12 cups per day    Cardiomyopathy ([address]     Darell Anton, now back to normal    Chronic bronchitis ([address]     Chronic rhinitis     exposed to dust  COPD (chronic obstructive pulmonary disease) [facility] 2015    Mild per PFT's 2014 [facility]    History of alcohol abuse     Hyperlipidemia     Hypertension     IGT (impaired glucose tolerance) 2015    Right shoulder pain     impingement, [place]    S/P ablation of atrial fibrillation 2011    [facility]    Spondylosis of cervical joint     Tobacco abuse          Past Surgical History:   Procedure Laterality Date    COLONOSCOPY  , ,  Usama    2 adenomatous polyps    ECHO COMPL W DOP COLOR FLOW  11/15/2011         HERNIA REPAIR      [place] [address] INCL FLUOR GDNCE DX W/CELL WASHG SPX N/A 2018    PANDOSCOPY WITH BIOPSY LEFT TRUE VOCAL CORD WITH FROZEN SECTION ++PATHOLOGY NEEDED++ performed by Sabrina Bee DO at [place] [address] TRANSORAL DIAGNOSTIC N/A 2018    ESOPHAGOSCOPY RIGID performed by Sabrina Bee DO at [facility] OR         Social History     Socioeconomic History    Marital status:      Spouse name: Jonnathan Alvarez Number of children: 2    Years of education: Not on file    Highest education level: Not on file   Occupational History    Not on file   Social Needs    Financial resource strain: Not on file    Food insecurity:     Worry: Not on file     Inability: Not on file    Transportation needs:     Medical: Not on file     Non-medical: Not on file   Tobacco Use    Smoking status: Former Smoker     Packs/day: 0.50     Years: 30.00     Pack years: 15.00     Types: Cigars     Last attempt to quit: 10/15/2018     Years since quittin.5    Smokeless tobacco: Never Used    Tobacco comment: Quit smoking cigars 2018   Substance and Sexual Activity    Alcohol use: No    Drug use: No    Sexual activity: Yes     Partners: Female   Lifestyle    Physical activity:     Days per week: Not on file     Minutes per session: Not on file    Stress: Not on file   Relationships    Social connections:     Talks on phone: Not on file     Gets together: Not on file     Attends [employer] service: Not on file     Active member of club or organization: Not on file     Attends meetings of clubs or organizations: Not on file     Relationship status: Not on file    Intimate partner violence:     Fear of current or ex partner: Not on file     Emotionally abused: Not on file     Physically abused: Not on file     Forced sexual activity: Not on file   Other Topics Concern    Not on file   Social History Narrative    Not on file         Family History   Problem Relation Age of Onset    Heart Disease Father 61        smoker    Cancer Brother         colon       Allergies:   Patient has no known allergies. Current Outpatient Medications   Medication Sig Dispense Refill    carvedilol (COREG) 12.5 MG tablet TAKE 1 TABLET BY MOUTH TWICE A  tablet 1    loratadine (CLARITIN) 10 MG tablet TAKE 1 TABLET BY MOUTH ONCE A DAY 90 tablet 1    aspirin 81 MG tablet Take 81 mg by mouth daily. No current facility-administered medications for this encounter. REVIEW OF SYSTEMS:  Complete recovery from laryngitis pharyngitis caused by radiation treatment. However, according to his ENT surgeon and sent note from 3/11/2019 patient was still suffering from intermittent dysphonia at that point in time which is only a month and a half ago. He otherwise is doing extremely well under remainder review of system with the exception of persistent epilation within the radiation portal    There were no vitals taken for this visit. Physical Exam   Constitutional: He is oriented to person, place, and time. He appears well-developed and well-nourished. No distress. HENT:   Head: Normocephalic and atraumatic. Mouth/Throat: Oropharynx is clear and moist. No oropharyngeal exudate.    Examinational oral cavity oropharynx reveals excellent dentition with a pink and moist mucosa and no thickening of the secretions no posterior pharyngeal wall hyperemia is appreciated   Eyes: Pupils are equal, round, and reactive to light. EOM are normal. Right eye exhibits no discharge. Left eye exhibits no discharge. Neck: Normal range of motion. Neck supple. No JVD present. No tracheal deviation present. Thyromegaly present. Patient has persistent epilation within the radiation portal the thyroid cartilage is mobile with full laryngeal click No lymphadenopathy noted    Cardiovascular: Normal rate and regular rhythm. Exam reveals no gallop and no friction rub. No murmur heard. Pulmonary/Chest: Effort normal and breath sounds normal. No stridor. No respiratory distress. He has no wheezes. He has no rales. Abdominal: Soft. Bowel sounds are normal. He exhibits no distension and no mass. There is no tenderness. There is no rebound and no guarding. No hernia. Musculoskeletal: Normal range of motion. He exhibits no edema or deformity. Lymphadenopathy:     He has no cervical adenopathy. Neurological: He is alert and oriented to person, place, and time. He displays normal reflexes. He exhibits normal muscle tone. Coordination normal.   Skin: Skin is warm. Capillary refill takes less than 2 seconds. No rash noted. There is erythema. No pallor. Patient has a or generalized broad the complexion and some residual erythema is noted within the radiation portal with complete epilation   Psychiatric: He has a normal mood and affect.  His behavior is normal. Judgment and thought content normal.       A/P:  39-year-old male  Invasive moderately differentiated squamous cell carcinoma involving the anterior one third of the left true vocal cord and anterior commissure, stage I T1b N0 M0, referred to us for consideration of definitive external beam radiation therapy, , completed 63 Zaria Mola in 28 divided fractions at 2.5 gray per fraction delivered to the entire glottis plus margin, treatment completed on 11/29/2018, patient now returning to our clinic for another follow-up posttreatment    At this point in time given no clinical evidence disease on frequent scoping by Dr. Evita Smart and the patient's near complete the recovery from acute sequelae radiation treatment, I'm relatively comfortable with the patient returning to my clinic only on a when necessary basis. However, made couple points very clear to the patient: 1-this is conditional upon patient agreeing to maintain close follow-up with his ENT surgeon, Alycia Armando M.D. and 2-patient [place] return to our clinic anytime he wants to or Dr. Evita Smart once him to come back to us. I thank you again for along me to participate in the care of care of this patient. I did advise the patient to use highest be of some blocks around his neck area during the summer months and not to allow his neck to sustain as sunburn during his 1st summer post radiation treatment                               Electronically signed by Hazel Recinos MD on 5/14/19at 10:39 AM      NOTE: This report was transcribed using voice recognition software. Every effort was made to ensure accuracy; however, inadvertent computerized transcription errors may be present.

## 2019-09-10 ENCOUNTER — OFFICE VISIT (OUTPATIENT)
Dept: ENT CLINIC | Age: 59
End: 2019-09-10
Payer: COMMERCIAL

## 2019-09-10 VITALS
BODY MASS INDEX: 27.04 KG/M2 | HEIGHT: 73 IN | DIASTOLIC BLOOD PRESSURE: 101 MMHG | WEIGHT: 204 LBS | HEART RATE: 69 BPM | SYSTOLIC BLOOD PRESSURE: 137 MMHG

## 2019-09-10 DIAGNOSIS — Z92.3 HISTORY OF RADIATION TO HEAD AND NECK REGION: ICD-10-CM

## 2019-09-10 DIAGNOSIS — C32.0 CANCER OF GLOTTIS (HCC): Primary | ICD-10-CM

## 2019-09-10 DIAGNOSIS — R49.0 HOARSENESS: ICD-10-CM

## 2019-09-10 DIAGNOSIS — C32.0 SQUAMOUS CELL CARCINOMA OF LEFT VOCAL CORD (HCC): ICD-10-CM

## 2019-09-10 DIAGNOSIS — J38.3 LESION OF VOCAL CORD: ICD-10-CM

## 2019-09-10 PROCEDURE — 31575 DIAGNOSTIC LARYNGOSCOPY: CPT | Performed by: OTOLARYNGOLOGY

## 2019-09-10 PROCEDURE — 99213 OFFICE O/P EST LOW 20 MIN: CPT | Performed by: OTOLARYNGOLOGY

## 2019-09-10 ASSESSMENT — ENCOUNTER SYMPTOMS
GASTROINTESTINAL NEGATIVE: 1
EYES NEGATIVE: 1
RESPIRATORY NEGATIVE: 1
COLOR CHANGE: 0
ABDOMINAL PAIN: 0
SHORTNESS OF BREATH: 0

## 2019-09-10 NOTE — PROGRESS NOTES
Subjective:      Patient ID:  Sheryle Mae is a 60 y.o. male. HPI:    Pt is here for recheck of voice after cancer treatment. He is doing well. PET in march was fine.             Social History     Tobacco Use   Smoking Status Former Smoker    Packs/day: 0.50    Years: 30.00    Pack years: 15.00    Types: Cigars    Last attempt to quit: 10/15/2018    Years since quittin.9   Smokeless Tobacco Never Used   Tobacco Comment    Quit smoking cigars 2018     Social History     Socioeconomic History    Marital status:      Spouse name: Tanya Reaves Number of children: 2    Years of education: None    Highest education level: None   Occupational History    None   Social Needs    Financial resource strain: None    Food insecurity:     Worry: None     Inability: None    Transportation needs:     Medical: None     Non-medical: None   Tobacco Use    Smoking status: Former Smoker     Packs/day: 0.50     Years: 30.00     Pack years: 15.00     Types: Cigars     Last attempt to quit: 10/15/2018     Years since quittin.9    Smokeless tobacco: Never Used    Tobacco comment: Quit smoking cigars 2018   Substance and Sexual Activity    Alcohol use: No    Drug use: No    Sexual activity: Yes     Partners: Female   Lifestyle    Physical activity:     Days per week: None     Minutes per session: None    Stress: None   Relationships    Social connections:     Talks on phone: None     Gets together: None     Attends Catholic service: None     Active member of club or organization: None     Attends meetings of clubs or organizations: None     Relationship status: None    Intimate partner violence:     Fear of current or ex partner: None     Emotionally abused: None     Physically abused: None     Forced sexual activity: None   Other Topics Concern    None   Social History Narrative    None           Patient's medications, allergies, past medical, surgical, social and

## 2019-10-27 ENCOUNTER — HOSPITAL ENCOUNTER (EMERGENCY)
Age: 59
Discharge: HOME | End: 2019-10-27
Payer: COMMERCIAL

## 2019-10-27 ENCOUNTER — APPOINTMENT (OUTPATIENT)
Dept: GENERAL RADIOLOGY | Age: 59
End: 2019-10-27
Payer: COMMERCIAL

## 2019-10-27 VITALS
OXYGEN SATURATION: 95 % | BODY MASS INDEX: 25.84 KG/M2 | WEIGHT: 195 LBS | SYSTOLIC BLOOD PRESSURE: 167 MMHG | HEIGHT: 73 IN | TEMPERATURE: 98.2 F | DIASTOLIC BLOOD PRESSURE: 106 MMHG | RESPIRATION RATE: 16 BRPM | HEART RATE: 82 BPM

## 2019-10-27 DIAGNOSIS — M54.50 LEFT-SIDED LOW BACK PAIN WITHOUT SCIATICA, UNSPECIFIED CHRONICITY: Primary | ICD-10-CM

## 2019-10-27 DIAGNOSIS — M51.36 DEGENERATIVE DISC DISEASE, LUMBAR: ICD-10-CM

## 2019-10-27 PROCEDURE — 99283 EMERGENCY DEPT VISIT LOW MDM: CPT

## 2019-10-27 PROCEDURE — 6370000000 HC RX 637 (ALT 250 FOR IP): Performed by: PHYSICIAN ASSISTANT

## 2019-10-27 PROCEDURE — 72110 X-RAY EXAM L-2 SPINE 4/>VWS: CPT

## 2019-10-27 RX ORDER — HYDROCODONE BITARTRATE AND ACETAMINOPHEN 5; 325 MG/1; MG/1
1 TABLET ORAL EVERY 6 HOURS PRN
Qty: 12 TABLET | Refills: 0 | Status: SHIPPED | OUTPATIENT
Start: 2019-10-27 | End: 2019-10-30

## 2019-10-27 RX ORDER — HYDROCODONE BITARTRATE AND ACETAMINOPHEN 5; 325 MG/1; MG/1
1 TABLET ORAL ONCE
Status: COMPLETED | OUTPATIENT
Start: 2019-10-27 | End: 2019-10-27

## 2019-10-27 RX ORDER — LIDOCAINE 50 MG/G
1 PATCH TOPICAL EVERY 24 HOURS
Qty: 10 PATCH | Refills: 0 | Status: SHIPPED | OUTPATIENT
Start: 2019-10-27 | End: 2019-11-06

## 2019-10-27 RX ORDER — CYCLOBENZAPRINE HCL 10 MG
10 TABLET ORAL 3 TIMES DAILY PRN
Qty: 30 TABLET | Refills: 0 | Status: SHIPPED | OUTPATIENT
Start: 2019-10-27 | End: 2020-06-12 | Stop reason: ALTCHOICE

## 2019-10-27 RX ADMIN — HYDROCODONE BITARTRATE AND ACETAMINOPHEN 1 TABLET: 5; 325 TABLET ORAL at 11:15

## 2019-10-27 ASSESSMENT — PAIN SCALES - GENERAL
PAINLEVEL_OUTOF10: 5
PAINLEVEL_OUTOF10: 9
PAINLEVEL_OUTOF10: 9

## 2019-10-27 ASSESSMENT — PAIN DESCRIPTION - FREQUENCY: FREQUENCY: CONTINUOUS

## 2019-10-27 ASSESSMENT — PAIN DESCRIPTION - LOCATION: LOCATION: BACK

## 2019-10-27 ASSESSMENT — PAIN DESCRIPTION - PAIN TYPE: TYPE: ACUTE PAIN

## 2019-10-28 ENCOUNTER — TELEPHONE (OUTPATIENT)
Dept: ADMINISTRATIVE | Age: 59
End: 2019-10-28

## 2019-10-28 ENCOUNTER — OFFICE VISIT (OUTPATIENT)
Dept: FAMILY MEDICINE CLINIC | Age: 59
End: 2019-10-28
Payer: COMMERCIAL

## 2019-10-28 VITALS
WEIGHT: 210 LBS | DIASTOLIC BLOOD PRESSURE: 90 MMHG | HEIGHT: 73 IN | SYSTOLIC BLOOD PRESSURE: 110 MMHG | HEART RATE: 98 BPM | BODY MASS INDEX: 27.83 KG/M2 | RESPIRATION RATE: 16 BRPM

## 2019-10-28 DIAGNOSIS — M54.50 CHRONIC MIDLINE LOW BACK PAIN WITHOUT SCIATICA: Primary | ICD-10-CM

## 2019-10-28 DIAGNOSIS — G89.29 CHRONIC MIDLINE LOW BACK PAIN WITHOUT SCIATICA: Primary | ICD-10-CM

## 2019-10-28 PROCEDURE — 99213 OFFICE O/P EST LOW 20 MIN: CPT | Performed by: STUDENT IN AN ORGANIZED HEALTH CARE EDUCATION/TRAINING PROGRAM

## 2019-10-28 ASSESSMENT — PATIENT HEALTH QUESTIONNAIRE - PHQ9
2. FEELING DOWN, DEPRESSED OR HOPELESS: 0
SUM OF ALL RESPONSES TO PHQ9 QUESTIONS 1 & 2: 0
1. LITTLE INTEREST OR PLEASURE IN DOING THINGS: 0
SUM OF ALL RESPONSES TO PHQ QUESTIONS 1-9: 0
SUM OF ALL RESPONSES TO PHQ QUESTIONS 1-9: 0

## 2019-10-28 ASSESSMENT — ENCOUNTER SYMPTOMS
SHORTNESS OF BREATH: 0
BACK PAIN: 1
VOMITING: 0
COUGH: 0
WHEEZING: 0
ABDOMINAL PAIN: 0
NAUSEA: 0

## 2019-11-15 ENCOUNTER — OFFICE VISIT (OUTPATIENT)
Dept: PODIATRY | Age: 59
End: 2019-11-15
Payer: COMMERCIAL

## 2019-11-15 ENCOUNTER — OFFICE VISIT (OUTPATIENT)
Dept: FAMILY MEDICINE CLINIC | Age: 59
End: 2019-11-15
Payer: COMMERCIAL

## 2019-11-15 VITALS
WEIGHT: 214 LBS | DIASTOLIC BLOOD PRESSURE: 80 MMHG | BODY MASS INDEX: 28.36 KG/M2 | HEIGHT: 73 IN | SYSTOLIC BLOOD PRESSURE: 128 MMHG | OXYGEN SATURATION: 98 % | HEART RATE: 67 BPM | RESPIRATION RATE: 16 BRPM | TEMPERATURE: 97.7 F

## 2019-11-15 VITALS — BODY MASS INDEX: 27.3 KG/M2 | HEIGHT: 73 IN | WEIGHT: 206 LBS | RESPIRATION RATE: 20 BRPM

## 2019-11-15 DIAGNOSIS — S90.211A CONTUSION OF RIGHT GREAT TOE WITH DAMAGE TO NAIL, INITIAL ENCOUNTER: Primary | ICD-10-CM

## 2019-11-15 DIAGNOSIS — M79.671 PAIN IN RIGHT FOOT: ICD-10-CM

## 2019-11-15 DIAGNOSIS — M79.674 GREAT TOE PAIN, RIGHT: ICD-10-CM

## 2019-11-15 DIAGNOSIS — L03.031 PARONYCHIA OF GREAT TOE, RIGHT: Primary | ICD-10-CM

## 2019-11-15 DIAGNOSIS — L03.031 PARONYCHIA OF GREAT TOE, RIGHT: ICD-10-CM

## 2019-11-15 PROCEDURE — 99214 OFFICE O/P EST MOD 30 MIN: CPT | Performed by: FAMILY MEDICINE

## 2019-11-15 PROCEDURE — 99213 OFFICE O/P EST LOW 20 MIN: CPT | Performed by: PODIATRIST

## 2019-11-15 RX ORDER — DOXYCYCLINE HYCLATE 100 MG
100 TABLET ORAL 2 TIMES DAILY
Qty: 20 TABLET | Refills: 0 | Status: SHIPPED | OUTPATIENT
Start: 2019-11-15 | End: 2019-11-25

## 2019-11-16 PROBLEM — L03.031 PARONYCHIA OF GREAT TOE, RIGHT: Status: ACTIVE | Noted: 2019-11-16

## 2019-11-16 PROBLEM — S90.211A CONTUSION OF RIGHT GREAT TOE WITH DAMAGE TO NAIL: Status: ACTIVE | Noted: 2019-11-16

## 2019-12-04 PROBLEM — G89.29 CHRONIC BILATERAL LOW BACK PAIN WITHOUT SCIATICA: Chronic | Status: ACTIVE | Noted: 2019-12-04

## 2019-12-04 PROBLEM — M54.50 CHRONIC BILATERAL LOW BACK PAIN WITHOUT SCIATICA: Chronic | Status: ACTIVE | Noted: 2019-12-04

## 2019-12-04 PROBLEM — S90.211A CONTUSION OF RIGHT GREAT TOE WITH DAMAGE TO NAIL: Status: RESOLVED | Noted: 2019-11-16 | Resolved: 2019-12-04

## 2020-03-10 ENCOUNTER — OFFICE VISIT (OUTPATIENT)
Dept: ENT CLINIC | Age: 60
End: 2020-03-10
Payer: COMMERCIAL

## 2020-03-10 VITALS
DIASTOLIC BLOOD PRESSURE: 81 MMHG | OXYGEN SATURATION: 96 % | WEIGHT: 214 LBS | HEIGHT: 73 IN | BODY MASS INDEX: 28.36 KG/M2 | HEART RATE: 70 BPM | SYSTOLIC BLOOD PRESSURE: 120 MMHG

## 2020-03-10 PROCEDURE — 99213 OFFICE O/P EST LOW 20 MIN: CPT | Performed by: OTOLARYNGOLOGY

## 2020-03-10 ASSESSMENT — ENCOUNTER SYMPTOMS
RESPIRATORY NEGATIVE: 1
ABDOMINAL PAIN: 0
EYES NEGATIVE: 1
GASTROINTESTINAL NEGATIVE: 1
SHORTNESS OF BREATH: 0
COLOR CHANGE: 0

## 2020-03-10 NOTE — PROGRESS NOTES
Subjective:      Patient ID:  Boone Ndiaye is a 63 y.o. male. HPI:    Pt is here for recheck of voice after cancer treatment. He is doing well. No new change in voice.              Social History     Tobacco Use   Smoking Status Former Smoker    Packs/day: 0.50    Years: 30.00    Pack years: 15.00    Types: Cigars    Last attempt to quit: 10/15/2018    Years since quittin.4   Smokeless Tobacco Never Used   Tobacco Comment    Quit smoking cigars 2018     Social History     Socioeconomic History    Marital status:      Spouse name: John Monte Number of children: 2    Years of education: None    Highest education level: None   Occupational History    None   Social Needs    Financial resource strain: None    Food insecurity     Worry: None     Inability: None    Transportation needs     Medical: None     Non-medical: None   Tobacco Use    Smoking status: Former Smoker     Packs/day: 0.50     Years: 30.00     Pack years: 15.00     Types: Cigars     Last attempt to quit: 10/15/2018     Years since quittin.4    Smokeless tobacco: Never Used    Tobacco comment: Quit smoking cigars 2018   Substance and Sexual Activity    Alcohol use: No    Drug use: No    Sexual activity: Yes     Partners: Female   Lifestyle    Physical activity     Days per week: None     Minutes per session: None    Stress: None   Relationships    Social connections     Talks on phone: None     Gets together: None     Attends Gnosticism service: None     Active member of club or organization: None     Attends meetings of clubs or organizations: None     Relationship status: None    Intimate partner violence     Fear of current or ex partner: None     Emotionally abused: None     Physically abused: None     Forced sexual activity: None   Other Topics Concern    None   Social History Narrative    None           Patient's medications, allergies, past medical, surgical, social and family histories were reviewed and updated as appropriate. Review of Systems   Constitutional: Negative. Eyes: Negative. Negative for visual disturbance. Respiratory: Negative. Negative for shortness of breath. Cardiovascular: Negative. Negative for chest pain. Gastrointestinal: Negative. Negative for abdominal pain. Genitourinary: Negative. Musculoskeletal: Negative. Skin: Negative. Negative for color change. Neurological: Negative. Psychiatric/Behavioral: Negative. Negative for behavioral problems and hallucinations. All other systems reviewed and are negative. Objective:     Vitals:    03/10/20 0757   BP: 120/81   Pulse: 70   SpO2: 96%     Physical Exam  Constitutional:       General: He is not in acute distress. Appearance: He is not diaphoretic. HENT:      Head: Normocephalic and atraumatic. Right Ear: Tympanic membrane and external ear normal.      Left Ear: Tympanic membrane and external ear normal.      Mouth/Throat:      Mouth: Mucous membranes are pale. Pharynx: No oropharyngeal exudate. Eyes:      Conjunctiva/sclera: Conjunctivae normal.      Pupils: Pupils are equal, round, and reactive to light. Neck:      Musculoskeletal: Normal range of motion and neck supple. Pulmonary:      Effort: Pulmonary effort is normal. No respiratory distress. Abdominal:      General: There is no distension. Musculoskeletal: Normal range of motion. Lymphadenopathy:      Cervical: No cervical adenopathy. Skin:     General: Skin is warm and dry. Neurological:      Mental Status: He is alert and oriented to person, place, and time. Assessment:       Diagnosis Orders   1. Cancer of glottis ([facility] [address]     2. History of radiation to head and neck region                Plan:      I would like to recheck in a few months again.   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 I have seen and examined the patient and the key elements of the encounter have been performed by me. I agree with the assessment, plan and orders as documented by the  resident              Remainder of medical problems as per  resident note. Patient seen and examined. Agree with above exam, assessment and plan.       Electronically signed by John Meneses DO on 3/10/20 at 2:46 PM EDT

## 2020-03-16 RX ORDER — CARVEDILOL 12.5 MG/1
TABLET ORAL
Qty: 180 TABLET | Refills: 1 | Status: SHIPPED
Start: 2020-03-16 | End: 2020-06-12 | Stop reason: SDUPTHER

## 2020-05-08 ENCOUNTER — OFFICE VISIT (OUTPATIENT)
Dept: ENT CLINIC | Age: 60
End: 2020-05-08
Payer: COMMERCIAL

## 2020-05-08 VITALS — WEIGHT: 208 LBS | HEIGHT: 73 IN | BODY MASS INDEX: 27.57 KG/M2

## 2020-05-08 PROCEDURE — 31575 DIAGNOSTIC LARYNGOSCOPY: CPT | Performed by: OTOLARYNGOLOGY

## 2020-05-08 PROCEDURE — 99213 OFFICE O/P EST LOW 20 MIN: CPT | Performed by: OTOLARYNGOLOGY

## 2020-05-08 ASSESSMENT — ENCOUNTER SYMPTOMS
COLOR CHANGE: 0
RESPIRATORY NEGATIVE: 1
ABDOMINAL PAIN: 0
VOICE CHANGE: 1
EYES NEGATIVE: 1
SHORTNESS OF BREATH: 0
GASTROINTESTINAL NEGATIVE: 1

## 2020-05-08 NOTE — PROGRESS NOTES
Narrative    None           Patient's medications, allergies, past medical, surgical, social and family histories were reviewed and updated as appropriate. Review of Systems   Constitutional: Negative. HENT: Positive for voice change. Eyes: Negative. Negative for visual disturbance. Respiratory: Negative. Negative for shortness of breath. Cardiovascular: Negative. Negative for chest pain. Gastrointestinal: Negative. Negative for abdominal pain. Genitourinary: Negative. Musculoskeletal: Negative. Skin: Negative. Negative for color change. Neurological: Negative. Psychiatric/Behavioral: Negative. Negative for behavioral problems and hallucinations. All other systems reviewed and are negative. Objective: There were no vitals filed for this visit. Physical Exam  Constitutional:       General: He is not in acute distress. Appearance: He is not diaphoretic. HENT:      Head: Normocephalic and atraumatic. Right Ear: Tympanic membrane and external ear normal.      Left Ear: Tympanic membrane and external ear normal.      Mouth/Throat:      Mouth: Mucous membranes are pale. Pharynx: No oropharyngeal exudate. Eyes:      Conjunctiva/sclera: Conjunctivae normal.      Pupils: Pupils are equal, round, and reactive to light. Neck:      Musculoskeletal: Normal range of motion and neck supple. Pulmonary:      Effort: Pulmonary effort is normal. No respiratory distress. Abdominal:      General: There is no distension. Musculoskeletal: Normal range of motion. Lymphadenopathy:      Cervical: No cervical adenopathy. Skin:     General: Skin is warm and dry. Neurological:      Mental Status: He is alert and oriented to person, place, and time.          Endoscopy Procedure Note    Pre-operative Diagnosis: hoarseness  Post-operative Diagnosis: same  Indications: Hoarseness, dysphagia or aspiration - not able to be clearly evaluated by indirect

## 2020-06-11 NOTE — PROGRESS NOTES
[facility]  Department of Family Medicine  Family Medicine Residency Program      Patient:  Alessia Rios 63 y.o. male     Date of Service: 6/11/20      Chief complaint:   Chief Complaint   Patient presents with    Established New Doctor         History of Present Illness   The patient is a 63 y.o. male  presented to the clinic with complaints as above. Has not seen Mak for many years. Needs labs     Hx afib/cardiomyopathy? On ASA only? Cards? He sees Dr. Carolyn Dotson. Symptoms -no symptoms since the surgery in 2011. Used to get rapid HR with blackouts. Only on the coreg. Hx of HTN  Meds - he takes daily. 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 Occasionally works out - free weights and treadmill     COPD with chronic bronchitis. Saw Pulm and had PFTs done. Meds? No meds. Has not had issues since the surgery. Symptoms? No SOB. Only with afib. Cont to smoke. Saw [facility]. 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 Just saw ENT after he had coughing. They did scope. He is good until Nov.  Started to smoke again. Wife smokes. Goes to derm every few years. Was seen here about 1 year ago for back pain - is a . Prev: needs colonoscopy.         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[address]     12 cups per day    Cardiomyopathy ([address]     Francisco Zendeajs, now back to normal    Chronic bilateral low back pain without sciatica 12/4/2019    Chronic bronchitis ([address]     Chronic rhinitis     exposed to dust    COPD (chronic obstructive pulmonary disease) ([address] 5/20/2015    Mild per PFT's 2014 [facility]    History of alcohol abuse     Hyperlipidemia     Hypertension     IGT (impaired glucose tolerance) 11/18/2015    Right shoulder pain     impingement, [place]    S/P ablation of atrial fibrillation 12/6/2011    [facility] at [facility]    Spondylosis of cervical joint     Appearance: Normal appearance. He is well-developed. He is not ill-appearing or toxic-appearing. HENT:      Head: Normocephalic and atraumatic. Eyes:      General: No scleral icterus. Right eye: No discharge. Left eye: No discharge. Neck:      Musculoskeletal: Neck supple. Thyroid: No thyromegaly. Vascular: No carotid bruit. Cardiovascular:      Rate and Rhythm: Normal rate and regular rhythm. Heart sounds: Normal heart sounds, S1 normal and S2 normal. No murmur. No friction rub. No gallop. Pulmonary:      Effort: Pulmonary effort is normal. No respiratory distress. Breath sounds: Normal breath sounds and air entry. No decreased breath sounds, wheezing or rales. Abdominal:      Palpations: Abdomen is soft. Tenderness: There is no abdominal tenderness. Musculoskeletal:      Right lower leg: No edema. Left lower leg: No edema. Lymphadenopathy:      Cervical: No cervical adenopathy. Skin:     General: Skin is warm and dry. Neurological:      General: No focal deficit present. Mental Status: He is alert and oriented to person, place, and time. Gait: Gait normal.   Psychiatric:         Attention and Perception: Attention normal.         Mood and Affect: Mood and affect normal.         Speech: Speech normal.         Behavior: Behavior normal. Behavior is cooperative. Thought Content: Thought content normal.         Cognition and Memory: Cognition and memory normal.         Judgment: Judgment normal.           Assessment and Plan       1. Atrial fibrillation, unspecified type ([address]  Stable on coreg. Labs drawn and meds refilled. Pt no longer seeing cards. - LIPID PANEL; Future  - COMPREHENSIVE METABOLIC PANEL; Future  - TSH; Future  - HEMOGLOBIN A1C; Future  - carvedilol (COREG) 12.5 MG tablet; 1 by mouth twice daily  Dispense: 180 tablet; Refill: 1    2. Cardiomyopathy, unspecified type ([address]  See above. Stable.   - LIPID PANEL; Future  - COMPREHENSIVE METABOLIC PANEL; Future  - TSH; Future  - HEMOGLOBIN A1C; Future    3. Allergic rhinitis, unspecified seasonality, unspecified trigger  Pt has had for many years. Stable on claritin. Has deviated septum  - loratadine (CLARITIN) 10 MG tablet; TAKE 1 TABLET BY MOUTH ONCE A DAY  Dispense: 90 tablet; Refill: 1    4. Impaired glucose tolerance  Labs checked today    5. Tubular adenoma of colon  Last colonoscopy done in 2012. Referral done to physician who did last colonosocpy. Ramona Gaston MD, General Surgery, [place]([facility])    6. Carcinoma of the vocal cord - pt seeing ENT. Has started smoking again. Have ed him to quit and on the importance of quitting. He is precontemplative. Return to Office: Return in about 6 months (around 12/12/2020) for afib, HTN . Medication List:    Current Outpatient Medications   Medication Sig Dispense Refill    loratadine (CLARITIN) 10 MG tablet TAKE 1 TABLET BY MOUTH ONCE A DAY 90 tablet 1    carvedilol (COREG) 12.5 MG tablet 1 by mouth twice daily 180 tablet 1    aspirin 81 MG tablet Take 81 mg by mouth daily. No current facility-administered medications for this visit. Sammy Pride MD       Counseled regarding above diagnosis, including possible risks and complications,  especially if left uncontrolled. Counseled regarding the possible side effects, risks, benefits and alternatives to treatment; patient and/or guardian verbalizes understanding, agrees, feels comfortable with and wishes to proceed with above treatment plan. Call or go to ED immediately if symptoms worsen or persist. Advised patient to call with any new medication issues, and, as applicable, read allRx info from pharmacy to assure aware of all possible risks and side effects of medication before taking.      As applicable, educational materialsand/or home exercises printed for patient's review and were included in patient instructions on his/her After Visit Summary and given to patient at the end of visit. Patient and/or guardian given opportunity to ask questions/raise concerns. The patient verbalized comfort and understanding ofinstructions. Follow Up:     Return in about 6 months (around 12/12/2020) for afib, HTN . or sooner if the above issues change unexpectedly or new issues develop     This document may have been prepared at leastpartially through the use of voice recognition software. Although effort is taken to assure the accuracy of this document, it is possible that grammatical, syntax,  or spelling errors may occur.

## 2020-06-12 ENCOUNTER — OFFICE VISIT (OUTPATIENT)
Dept: FAMILY MEDICINE CLINIC | Age: 60
End: 2020-06-12
Payer: COMMERCIAL

## 2020-06-12 ENCOUNTER — HOSPITAL ENCOUNTER (OUTPATIENT)
Age: 60
Discharge: HOME | End: 2020-06-14
Payer: COMMERCIAL

## 2020-06-12 VITALS
BODY MASS INDEX: 27.3 KG/M2 | OXYGEN SATURATION: 98 % | TEMPERATURE: 97.2 F | SYSTOLIC BLOOD PRESSURE: 135 MMHG | HEIGHT: 73 IN | HEART RATE: 65 BPM | WEIGHT: 206 LBS | DIASTOLIC BLOOD PRESSURE: 89 MMHG | RESPIRATION RATE: 18 BRPM

## 2020-06-12 PROBLEM — D12.6 TUBULAR ADENOMA OF COLON: Status: ACTIVE | Noted: 2020-06-12

## 2020-06-12 PROBLEM — L03.031 PARONYCHIA OF GREAT TOE, RIGHT: Status: RESOLVED | Noted: 2019-11-16 | Resolved: 2020-06-12

## 2020-06-12 LAB — HBA1C MFR BLD: 5.6 % (ref 4–5.6)

## 2020-06-12 PROCEDURE — 80053 COMPREHEN METABOLIC PANEL: CPT

## 2020-06-12 PROCEDURE — 83036 HEMOGLOBIN GLYCOSYLATED A1C: CPT

## 2020-06-12 PROCEDURE — 84443 ASSAY THYROID STIM HORMONE: CPT

## 2020-06-12 PROCEDURE — 80061 LIPID PANEL: CPT

## 2020-06-12 PROCEDURE — 99214 OFFICE O/P EST MOD 30 MIN: CPT | Performed by: FAMILY MEDICINE

## 2020-06-12 RX ORDER — LORATADINE 10 MG/1
TABLET ORAL
Qty: 90 TABLET | Refills: 1 | Status: SHIPPED
Start: 2020-06-12 | End: 2020-12-17

## 2020-06-12 RX ORDER — CARVEDILOL 12.5 MG/1
TABLET ORAL
Qty: 180 TABLET | Refills: 1
Start: 2020-06-12 | End: 2020-09-11 | Stop reason: SDUPTHER

## 2020-06-12 ASSESSMENT — PATIENT HEALTH QUESTIONNAIRE - PHQ9
SUM OF ALL RESPONSES TO PHQ QUESTIONS 1-9: 0
SUM OF ALL RESPONSES TO PHQ QUESTIONS 1-9: 0
2. FEELING DOWN, DEPRESSED OR HOPELESS: 0
1. LITTLE INTEREST OR PLEASURE IN DOING THINGS: 0
SUM OF ALL RESPONSES TO PHQ9 QUESTIONS 1 & 2: 0

## 2020-06-12 ASSESSMENT — ENCOUNTER SYMPTOMS
BACK PAIN: 0
WHEEZING: 0
SINUS PAIN: 1
CHEST TIGHTNESS: 0
DIARRHEA: 0
VOICE CHANGE: 0
COUGH: 0
BLOOD IN STOOL: 1
SINUS PRESSURE: 1
SORE THROAT: 0
SHORTNESS OF BREATH: 0
CONSTIPATION: 0

## 2020-06-12 NOTE — PATIENT INSTRUCTIONS
Call Dr. Morgan Burgess office to see if you are due for a colonoscopy.      Consider going to dermatology again to have skin checked

## 2020-06-13 ENCOUNTER — TELEPHONE (OUTPATIENT)
Dept: FAMILY MEDICINE CLINIC | Age: 60
End: 2020-06-13

## 2020-06-13 LAB
ALBUMIN SERPL-MCNC: 4.3 G/DL (ref 3.5–5.2)
ALP BLD-CCNC: 48 U/L (ref 40–129)
ALT SERPL-CCNC: 15 U/L (ref 0–40)
ANION GAP SERPL CALCULATED.3IONS-SCNC: 15 MMOL/L (ref 7–16)
AST SERPL-CCNC: 22 U/L (ref 0–39)
BILIRUB SERPL-MCNC: 0.3 MG/DL (ref 0–1.2)
BUN BLDV-MCNC: 15 MG/DL (ref 8–23)
CALCIUM SERPL-MCNC: 9.4 MG/DL (ref 8.6–10.2)
CHLORIDE BLD-SCNC: 105 MMOL/L (ref 98–107)
CHOLESTEROL, TOTAL: 183 MG/DL (ref 0–199)
CO2: 22 MMOL/L (ref 22–29)
CREAT SERPL-MCNC: 0.9 MG/DL (ref 0.7–1.2)
GFR AFRICAN AMERICAN: >60
GFR NON-AFRICAN AMERICAN: >60 ML/MIN/1.73
GLUCOSE BLD-MCNC: 86 MG/DL (ref 74–99)
HDLC SERPL-MCNC: 52 MG/DL
LDL CHOLESTEROL CALCULATED: 122 MG/DL (ref 0–99)
POTASSIUM SERPL-SCNC: 4.9 MMOL/L (ref 3.5–5)
SODIUM BLD-SCNC: 142 MMOL/L (ref 132–146)
TOTAL PROTEIN: 7.3 G/DL (ref 6.4–8.3)
TRIGL SERPL-MCNC: 45 MG/DL (ref 0–149)
TSH SERPL DL<=0.05 MIU/L-ACNC: 1.24 UIU/ML (ref 0.27–4.2)
VLDLC SERPL CALC-MCNC: 9 MG/DL

## 2020-06-15 ENCOUNTER — TELEPHONE (OUTPATIENT)
Dept: FAMILY MEDICINE CLINIC | Age: 60
End: 2020-06-15

## 2020-07-13 ENCOUNTER — HOSPITAL ENCOUNTER (OUTPATIENT)
Age: 60
Discharge: HOME | End: 2020-07-15
Payer: COMMERCIAL

## 2020-07-13 PROCEDURE — 88305 TISSUE EXAM BY PATHOLOGIST: CPT

## 2020-09-11 RX ORDER — CARVEDILOL 12.5 MG/1
TABLET ORAL
Qty: 180 TABLET | Refills: 1 | Status: SHIPPED
Start: 2020-09-11 | End: 2021-02-04 | Stop reason: SDUPTHER

## 2020-09-15 ENCOUNTER — OFFICE VISIT (OUTPATIENT)
Dept: ENT CLINIC | Age: 60
End: 2020-09-15
Payer: COMMERCIAL

## 2020-09-15 VITALS — HEIGHT: 73 IN | BODY MASS INDEX: 26.51 KG/M2 | WEIGHT: 200 LBS | TEMPERATURE: 97 F

## 2020-09-15 PROCEDURE — 99213 OFFICE O/P EST LOW 20 MIN: CPT | Performed by: OTOLARYNGOLOGY

## 2020-09-15 ASSESSMENT — ENCOUNTER SYMPTOMS
EYES NEGATIVE: 1
RESPIRATORY NEGATIVE: 1
ABDOMINAL PAIN: 0
COLOR CHANGE: 0
SHORTNESS OF BREATH: 0
VOICE CHANGE: 1
GASTROINTESTINAL NEGATIVE: 1

## 2020-09-15 NOTE — PROGRESS NOTES
Subjective:      Patient ID:  Rody Hernandez is a 63 y.o. male. HPI:    Pt is here for recheck of voice after cancer treatment. Patient offers no complains at this time. Reports hoarseness has resolved. H/o [id number] L SCC sp radiation 2018.     Patient is currently smoking cigars         Social History     Tobacco Use   Smoking Status Current Every Day Smoker    Packs/day: 0.50    Years: 30.00    Pack years: 15.00    Types: Cigars, Cigarettes    Last attempt to quit: 10/15/2018    Years since quittin.9   Smokeless Tobacco Never Used   Tobacco Comment    Quit smoking cigars 2018, started again in      Social History     Socioeconomic History    Marital status:      Spouse name: Tan Jordan Number of children: 2    Years of education: None    Highest education level: None   Occupational History    None   Social Needs    Financial resource strain: None    Food insecurity     Worry: None     Inability: None    Transportation needs     Medical: None     Non-medical: None   Tobacco Use    Smoking status: Current Every Day Smoker     Packs/day: 0.50     Years: 30.00     Pack years: 15.00     Types: Cigars, Cigarettes     Last attempt to quit: 10/15/2018     Years since quittin.9    Smokeless tobacco: Never Used    Tobacco comment: Quit smoking cigars 2018, started again in    Substance and Sexual Activity    Alcohol use: No    Drug use: No    Sexual activity: Yes     Partners: Female   Lifestyle    Physical activity     Days per week: None     Minutes per session: None    Stress: None   Relationships    Social connections     Talks on phone: None     Gets together: None     Attends Religion service: None     Active member of club or organization: None     Attends meetings of clubs or organizations: None     Relationship status: None    Intimate partner violence     Fear of current or ex partner: None     Emotionally abused: None cord ([facility] [address]     2. History of radiation to head and neck region                Plan:   T1b N0 M0 left SCC s/p radiation Nov 2018  No changes at this visit    Repeat scope in 6 months    Electronically signed by Nicolle Ortiz DO on 9/15/2020 at 8:19 AM          Jeevan Body  1960    I have discussed the case, including pertinent history and exam findings with the resident. I have seen and examined the patient and the key elements of the encounter have been performed by me. I agree with the assessment, plan and orders as documented by the  resident              Remainder of medical problems as per  resident note. Patient seen and examined. Agree with above exam, assessment and plan.       Electronically signed by Sylvie Cutler DO on 9/15/20 at 2:43 PM EDT

## 2020-12-17 RX ORDER — LORATADINE 10 MG/1
TABLET ORAL
Qty: 90 TABLET | Refills: 1 | Status: SHIPPED
Start: 2020-12-17 | End: 2021-02-04 | Stop reason: SDUPTHER

## 2021-02-01 NOTE — PROGRESS NOTES
Luz Maria Jasso (:  1960) is a 63 y.o. male,Established patient, here for evaluation of the following chief complaint(s):  Hypertension      ASSESSMENT/PLAN:  1. Cardiomyopathy, unspecified type ([facility] [address]  -     carvedilol (COREG) 12.5 MG tablet; 1 by mouth twice daily, Disp-180 tablet, R-1Normal  Pt with no s/s of cardiomyopathy. Just saw cards. Note requested. 2. Benign essential hypertension   Well controlled on current meds. Labs ordered for next visit. Increase ASCVD risk - pt currently not willing to take statin. Have asked him to consider and have ed him on change in ASCVD risk if he stops smoking. 3. Atrial fibrillation, unspecified type ([facility] [address]  -     COMPREHENSIVE METABOLIC PANEL; Future  -     LIPID PANEL; Future  -     carvedilol (COREG) 12.5 MG tablet; 1 by mouth twice daily, Disp-180 tablet, R-1Normal   Controlled on current meds. 4. Impaired glucose tolerance  -     HEMOGLOBIN A1C; Future  Pt ed on low carb diet. 5. Tubular adenoma of colon   Notes from Dr. Minoo Lund office ordered. 6. Caffeine abuse ([facility] [address]   Pt unwilling to change. Pt ed. 7. Tobacco user    Pt in precontemplative stage. 8. Carcinoma of vocal cord ([facility] [address]   Pt saw ENT. See #7.  9. Allergic rhinitis, unspecified seasonality, unspecified trigger  -     loratadine (CLARITIN) 10 MG tablet; TAKE 1 TABLET BY MOUTH EVERY DAY, Disp-90 tablet, R-0Normal   Pt is stable on current meds. Return in about 6 months (around 2021) for afib.  needs appt 1 week ahead of time fo lab. SUBJECTIVE/OBJECTIVE:  HPI     Needs colonoscopy for follow up on adenoma. Saw Dr. Rachel Hartman. We referred in  - was this done? He had it done. We need results    . Not working right now. Will start work in the spring.

## 2021-02-02 ENCOUNTER — OFFICE VISIT (OUTPATIENT)
Dept: FAMILY MEDICINE CLINIC | Age: 61
End: 2021-02-02
Payer: COMMERCIAL

## 2021-02-02 VITALS
TEMPERATURE: 97.6 F | SYSTOLIC BLOOD PRESSURE: 137 MMHG | HEIGHT: 73 IN | RESPIRATION RATE: 16 BRPM | OXYGEN SATURATION: 98 % | DIASTOLIC BLOOD PRESSURE: 84 MMHG | WEIGHT: 206 LBS | HEART RATE: 74 BPM | BODY MASS INDEX: 27.3 KG/M2

## 2021-02-02 DIAGNOSIS — Z72.0 TOBACCO USER: ICD-10-CM

## 2021-02-02 DIAGNOSIS — C32.0 CARCINOMA OF VOCAL CORD (HCC): ICD-10-CM

## 2021-02-02 DIAGNOSIS — F15.10 CAFFEINE ABUSE (HCC): ICD-10-CM

## 2021-02-02 DIAGNOSIS — R73.02 IMPAIRED GLUCOSE TOLERANCE: ICD-10-CM

## 2021-02-02 DIAGNOSIS — D12.6 TUBULAR ADENOMA OF COLON: ICD-10-CM

## 2021-02-02 DIAGNOSIS — I48.91 ATRIAL FIBRILLATION, UNSPECIFIED TYPE (HCC): Chronic | ICD-10-CM

## 2021-02-02 DIAGNOSIS — J30.9 ALLERGIC RHINITIS, UNSPECIFIED SEASONALITY, UNSPECIFIED TRIGGER: Chronic | ICD-10-CM

## 2021-02-02 DIAGNOSIS — I42.9 CARDIOMYOPATHY, UNSPECIFIED TYPE (HCC): Primary | Chronic | ICD-10-CM

## 2021-02-02 DIAGNOSIS — I10 BENIGN ESSENTIAL HYPERTENSION: ICD-10-CM

## 2021-02-02 PROCEDURE — 99214 OFFICE O/P EST MOD 30 MIN: CPT | Performed by: FAMILY MEDICINE

## 2021-02-02 ASSESSMENT — PATIENT HEALTH QUESTIONNAIRE - PHQ9
1. LITTLE INTEREST OR PLEASURE IN DOING THINGS: 0
SUM OF ALL RESPONSES TO PHQ QUESTIONS 1-9: 0
2. FEELING DOWN, DEPRESSED OR HOPELESS: 0

## 2021-02-02 NOTE — PATIENT INSTRUCTIONS
Your risk of heart disease will  from 17% in the next 10  Years to 6% if you quit tobacco.    If you choose not to quit, consider starting a low dose medicine to help prevention heart attack (pravastatin in one of them).

## 2021-02-02 NOTE — Clinical Note
Can we get the most recent note from [facility] cardiology? Pt was recently seen. Thanks.

## 2021-02-04 RX ORDER — LORATADINE 10 MG/1
TABLET ORAL
Qty: 90 TABLET | Refills: 0 | Status: SHIPPED
Start: 2021-02-04 | End: 2021-08-12

## 2021-02-04 RX ORDER — CARVEDILOL 12.5 MG/1
TABLET ORAL
Qty: 180 TABLET | Refills: 1 | Status: SHIPPED
Start: 2021-02-04 | End: 2021-08-13 | Stop reason: SDUPTHER

## 2021-02-15 ENCOUNTER — TELEPHONE (OUTPATIENT)
Dept: ADMINISTRATIVE | Age: 61
End: 2021-02-15

## 2021-02-15 NOTE — TELEPHONE ENCOUNTER
Because this is an over the counter medicine it may not be covered. If they can find out which medicine is covered for allergies, I can write a script for it. Some options would be allegra, xyzal, zyrtec.   campbellw

## 2021-02-15 NOTE — TELEPHONE ENCOUNTER
Pt's wife called, their ins no longer pays for his Rx of Loratadine 10mg. Pt's wife is wondering if Dr Mendel Hall can prescribe something comparable that the ins may accept. Pt recently got the Loratadine 10mg filled at [facility] in [place], it was a 90 day supply. Pt's wife would like a call back concerning this matter, [phone].

## 2021-03-16 ENCOUNTER — OFFICE VISIT (OUTPATIENT)
Dept: ENT CLINIC | Age: 61
End: 2021-03-16
Payer: COMMERCIAL

## 2021-03-16 VITALS
SYSTOLIC BLOOD PRESSURE: 129 MMHG | HEART RATE: 67 BPM | DIASTOLIC BLOOD PRESSURE: 85 MMHG | HEIGHT: 73 IN | RESPIRATION RATE: 14 BRPM | TEMPERATURE: 97.6 F | WEIGHT: 204 LBS | BODY MASS INDEX: 27.04 KG/M2 | OXYGEN SATURATION: 98 %

## 2021-03-16 DIAGNOSIS — J30.1 SEASONAL ALLERGIC RHINITIS DUE TO POLLEN: ICD-10-CM

## 2021-03-16 DIAGNOSIS — Z92.3 HISTORY OF RADIATION TO HEAD AND NECK REGION: ICD-10-CM

## 2021-03-16 DIAGNOSIS — C32.0 SQUAMOUS CELL CARCINOMA OF LEFT VOCAL CORD (HCC): Primary | ICD-10-CM

## 2021-03-16 DIAGNOSIS — C32.0 CANCER OF GLOTTIS (HCC): ICD-10-CM

## 2021-03-16 PROCEDURE — 99213 OFFICE O/P EST LOW 20 MIN: CPT | Performed by: OTOLARYNGOLOGY

## 2021-03-16 PROCEDURE — 31575 DIAGNOSTIC LARYNGOSCOPY: CPT | Performed by: OTOLARYNGOLOGY

## 2021-03-16 ASSESSMENT — ENCOUNTER SYMPTOMS
COLOR CHANGE: 0
EYES NEGATIVE: 1
ABDOMINAL PAIN: 0
RESPIRATORY NEGATIVE: 1
SHORTNESS OF BREATH: 0
GASTROINTESTINAL NEGATIVE: 1
VOICE CHANGE: 1

## 2021-03-16 NOTE — PROGRESS NOTES
Subjective:      Patient ID:  Claudell Parkinson is a 57 y.o. male. HPI:    Patient presents today for recheck cancer Condition has been present for 3 year(s).  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[facility]     12 cups per day    Cardiomyopathy ([address]     Alen Settles, now back to normal    Chronic bilateral low back pain without sciatica 12/4/2019    Chronic bronchitis ([address]     Chronic rhinitis     exposed to dust    COPD (chronic obstructive pulmonary disease) ([facility] 5/20/2015    Mild per PFT's 2014 [facility]    History of alcohol abuse     Hyperlipidemia     Hypertension     IGT (impaired glucose tolerance) 11/18/2015    Right shoulder pain     impingement, Duffet    S/P ablation of atrial fibrillation 12/6/2011    ablation at [facility]    Spondylosis of cervical joint     Tobacco abuse      Past Surgical History:   Procedure Laterality Date    COLONOSCOPY  08, 09, 2012 [facility]    2 adenomatous polyps    ECHO COMPL W DOP COLOR FLOW  11/15/2011         HERNIA REPAIR      [place] [address] INCL FLUOR GDNCE DX W/CELL WASHG SPX N/A 9/28/2018    PANDOSCOPY WITH BIOPSY LEFT TRUE VOCAL CORD WITH FROZEN SECTION ++PATHOLOGY NEEDED++ performed by Hernán Montaño DO at [facility] OR    [place] ESOPHAGOSCOPY FLEXIBLE TRANSORAL DIAGNOSTIC N/A 9/28/2018    ESOPHAGOSCOPY RIGID performed by Hernán Montaño DO at [facility] OR     Family History   Problem Relation Age of Onset    Heart Disease Father 61        smoker    Cancer Brother         colon     Social History     Socioeconomic History    Marital status:      Spouse name: Deanna Tineo Number of children: 2    Years of education: None    Highest education level: None   Occupational History    None   Social Needs    Financial resource strain: None    Food insecurity     Worry: None     Inability: None    Transportation needs     Medical: None     Non-medical: None   Tobacco Use    Smoking status: Current Every Day Smoker     Packs/day: 0.50     Years: 30.00     Pack years: 15.00     Types: Cigars, Cigarettes     Last attempt to quit: 10/15/2018     Years since quittin.4    Smokeless tobacco: Never Used    Tobacco comment: Quit smoking cigars 2018, started again in    Substance and Sexual Activity    Alcohol use: No    Drug use: No    Sexual activity: Yes     Partners: Female   Lifestyle    Physical activity     Days per week: None     Minutes per session: None    Stress: None   Relationships    Social connections     Talks on phone: None     Gets together: None     Attends Holiness service: None     Active member of club or organization: None     Attends meetings of clubs or organizations: None     Relationship status: None    Intimate partner violence     Fear of current or ex partner: None     Emotionally abused: None     Physically abused: None     Forced sexual activity: None   Other Topics Concern    None   Social History Narrative    None     No Known Allergies    Review of Systems   Constitutional: Negative. HENT: Positive for voice change. Eyes: Negative. Negative for visual disturbance. Respiratory: Negative. Negative for shortness of breath. Cardiovascular: Negative. Negative for chest pain. Gastrointestinal: Negative. Negative for abdominal pain. Genitourinary: Negative. Musculoskeletal: Negative. Skin: Negative. Negative for color change. Neurological: Negative. Psychiatric/Behavioral: Negative. Negative for behavioral problems and hallucinations. All other systems reviewed and are negative. Objective:     Vitals:    21 0813   BP: 129/85   Pulse: 67   Resp: 14   Temp: 97.6 °F (36.4 °C)   SpO2: 98%     Physical Exam  Constitutional:       General: He is not in acute distress. Appearance: He is not diaphoretic. HENT:      Head: Normocephalic and atraumatic. Right Ear: Tympanic membrane and external ear normal.      Left Ear: Tympanic membrane and external ear normal.      Mouth/Throat:      Mouth: Mucous membranes are pale. Pharynx: No oropharyngeal exudate. Eyes:      Conjunctiva/sclera: Conjunctivae normal.      Pupils: Pupils are equal, round, and reactive to light. Neck:      Musculoskeletal: Normal range of motion and neck supple. Pulmonary:      Effort: Pulmonary effort is normal. No respiratory distress. Abdominal:      General: There is no distension. Musculoskeletal: Normal range of motion. Lymphadenopathy:      Cervical: No cervical adenopathy. Skin:     General: Skin is warm and dry. Neurological:      Mental Status: He is alert and oriented to person, place, and time. Endoscopy Procedure Note    Pre-operative Diagnosis: history of throat cancer and tobacco abuse    Post-operative Diagnosis: normal    Indications: Hoarseness, dysphagia or aspiration - not able to be clearly evaluated by indirect laryngoscopy    Anesthesia: Lidocaine 2% and Carrillo-Synephrine 1/2%    Endoscopy Type:  nasal endoscopy, nasopharyngoscopy, laryngoscopy    Procedure Details   With the patient sitting upright in the examining chair informed consent was obtained. The bilateral side(s) of the nose were topically anesthetized with spray. After waiting an appropriate period of time for anesthesia/ vasoconstriction to become effective, the 0-degree  flexible laryngoscope was passed through the right side(s) of the nose, and the nose, nasopharynx, oropharynx, hypopharynx and larynx were examined. An identical procedure was performed on the contralateral side. Examination was performed during quiet respiration and with phonation. I was present for the entire procedure. The following findings were noted.     Findings:  Mucosa:  pale and boggy and erythematous   Nasal septum:  normal   Turbinates:  pale, swollen, edematous   Adenoid:  normal   Eustachian tubes:  normal   Mucous stranding:  present   Lesions:  absent   Modified Anderson's Maneuver not indicated   Larynx Supraglottis, false and true vocal cord were normal.  Vocal cord mobility was normal.  Subglottis is patent. Erythema improved from last visit. Condition:  Stable    Complications:  None    Pictures:                                Assessment:       Diagnosis Orders   1. 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 History of radiation to head and neck region     3. Cancer of glottis ([address]     4. Seasonal allergic rhinitis due to pollen                Plan:      Pt is doing well and the VC look improved on scope today. Follow up in 6 month(s) possible scope. Discussed tobacco abuse.

## 2021-07-29 ENCOUNTER — TELEPHONE (OUTPATIENT)
Dept: FAMILY MEDICINE CLINIC | Age: 61
End: 2021-07-29

## 2021-07-29 NOTE — TELEPHONE ENCOUNTER
----- Message from Maximiliano Reid sent at 7/29/2021 12:24 PM EDT -----  Subject: Message to Provider    QUESTIONS  Information for Provider? Bibiana Abbott - Wife calling pt. has Lab appointment   on 8/4. They want to know if pt. needs to fast. Requesting call back at   number below, ok to leave VM.   ---------------------------------------------------------------------------  --------------  [address]  What is the best way for the office to contact you? OK to leave message on   voicemail  Preferred Call Back Phone Number? [phone]  ---------------------------------------------------------------------------  --------------  SCRIPT ANSWERS  Relationship to Patient? Other  Representative Name? Nanci-Wife  Is the Representative on the appropriate HIPAA document in Epic?  Yes

## 2021-08-04 ENCOUNTER — NURSE ONLY (OUTPATIENT)
Dept: FAMILY MEDICINE CLINIC | Age: 61
End: 2021-08-04
Payer: COMMERCIAL

## 2021-08-04 DIAGNOSIS — R73.02 IMPAIRED GLUCOSE TOLERANCE: ICD-10-CM

## 2021-08-04 DIAGNOSIS — I48.91 ATRIAL FIBRILLATION, UNSPECIFIED TYPE (HCC): Chronic | ICD-10-CM

## 2021-08-04 LAB
ALBUMIN SERPL-MCNC: 4.1 G/DL (ref 3.5–5.2)
ALP BLD-CCNC: 47 U/L (ref 40–129)
ALT SERPL-CCNC: <5 U/L (ref 0–40)
ANION GAP SERPL CALCULATED.3IONS-SCNC: 7 MMOL/L (ref 7–16)
AST SERPL-CCNC: 20 U/L (ref 0–39)
BILIRUB SERPL-MCNC: 0.2 MG/DL (ref 0–1.2)
BUN BLDV-MCNC: 21 MG/DL (ref 6–23)
CALCIUM SERPL-MCNC: 9.5 MG/DL (ref 8.6–10.2)
CHLORIDE BLD-SCNC: 105 MMOL/L (ref 98–107)
CHOLESTEROL, TOTAL: 174 MG/DL (ref 0–199)
CO2: 27 MMOL/L (ref 22–29)
CREAT SERPL-MCNC: 1 MG/DL (ref 0.7–1.2)
GFR AFRICAN AMERICAN: >60
GFR NON-AFRICAN AMERICAN: >60 ML/MIN/1.73
GLUCOSE BLD-MCNC: 93 MG/DL (ref 74–99)
HBA1C MFR BLD: 5.3 % (ref 4–5.6)
HDLC SERPL-MCNC: 48 MG/DL
LDL CHOLESTEROL CALCULATED: 113 MG/DL (ref 0–99)
POTASSIUM SERPL-SCNC: 4.5 MMOL/L (ref 3.5–5)
SODIUM BLD-SCNC: 139 MMOL/L (ref 132–146)
TOTAL PROTEIN: 6.9 G/DL (ref 6.4–8.3)
TRIGL SERPL-MCNC: 66 MG/DL (ref 0–149)
VLDLC SERPL CALC-MCNC: 13 MG/DL

## 2021-08-04 PROCEDURE — 36415 COLL VENOUS BLD VENIPUNCTURE: CPT | Performed by: FAMILY MEDICINE

## 2021-08-12 NOTE — PROGRESS NOTES
Michael Vides (:  1960) is a 57 y.o. male,Established patient, here for evaluation of the following chief complaint(s):  Atrial Fibrillation (6 mon check up ), Chest Congestion (1 week ), and Health Maintenance (not interested in PSV23 at this time )         ASSESSMENT/PLAN:  1. Benign essential hypertension  Assessment & Plan:  Cont on coreg  Reviewed labs  2. Impaired glucose tolerance  Assessment & Plan:  Went over labs today. Current HbA1c well controlled. 3. Chronic bronchitis with productive mucopurulent cough ([address]  Assessment & Plan:  CXR ordered. Pt ed to quit. May need PFT - currently no SOB with exercise, no wheezing. Orders:  -     XR CHEST STANDARD (2 VW); Future  4. S/P ablation of atrial fibrillation  Assessment & Plan:   Cont on coreg and ASA. Labs reviewed  5. Atrial fibrillation, unspecified type ([facility])  -     carvedilol (COREG) 12.5 MG tablet; 1 by mouth twice daily, Disp-180 tablet, R-1Normal  6. Cardiomyopathy, unspecified type ([address]  -     carvedilol (COREG) 12.5 MG tablet; 1 by mouth twice daily, Disp-180 tablet, R-1Normal  7. Carcinoma of vocal cord [facility])  Assessment & Plan:   Saw ENT in March. Things looking better. Ed on tobacco use. Pt not ready to quit. 8. Tubular adenoma of colon  Assessment & Plan: Will need follow up colonosocpy   9. Tobacco user  Assessment & Plan:   Ed pt to quit  cough - bronchitis    Return in about 6 months (around 2022) for HTN, cough. Subjective   SUBJECTIVE/OBJECTIVE:  HPI     Last visit with cardio? Doesn't remember. He sees once a year. Sees Dr. Des Rivera. Lipids - , HDL 48. Not on statin. Does not want statin. He has had some chest congestion for the last 8 days. He is not taking anything for it. Coughing in the AM.  Still smoking - 2 ppd (small cigars). Not quite ready to quit.       Last HbA1c 5.3    Patient Active Problem List    Diagnosis Date Noted    Chronic bronchitis with productive mucopurulent cough ([facility] 08/13/2021     Pt with chronic AM cough upon wakening. No wheezing. Not on meds      Tubular adenoma of colon 06/12/2020     Seen on both colonoscopy 2012, 2020 (July)        Chronic bilateral low back pain without sciatica 12/04/2019    Carcinoma of vocal cord ([facility] 10/05/2018     Pt seeing ENT  Still smoking      Impaired glucose tolerance 11/18/2015     HbA1c 5.3      Allergic rhinitis 04/29/2013    S/P ablation of atrial fibrillation      On coreg and ASA, 81 mg      Tobacco user      Pt smokes 2 ppd.  precontemplative      History of alcohol abuse     Caffeine abuse (HCC)     Spondylosis of cervical joint     Chronic rhinitis     Atrial fibrillation ([facility] 11/13/2011     S/P ablation       Benign essential hypertension 10/26/2011     On coreg - also for afib and non-ischemic cardiomyopathy      Other primary cardiomyopathies 10/26/2011     Non-ischemic Cardiomyopathy  Sees cardio - last visit: unknown. Miguel. Review of Systems   Constitutional: Negative for activity change, appetite change, chills, diaphoresis, fatigue and fever. HENT: Positive for congestion and postnasal drip. Respiratory: Positive for cough. Negative for chest tightness, shortness of breath and wheezing. Cardiovascular: Negative for chest pain, palpitations and leg swelling. Genitourinary: Negative for difficulty urinating, frequency and urgency. Neurological: Negative for dizziness and light-headedness. Objective   Physical Exam  Vitals and nursing note reviewed. Constitutional:       Appearance: Normal appearance. He is well-developed. He is not ill-appearing or toxic-appearing. HENT:      Head: Normocephalic and atraumatic. Right Ear: Tympanic membrane and ear canal normal.      Left Ear: Tympanic membrane and ear canal normal.      Nose: Nose normal.      Mouth/Throat:      Lips: Pink.       Mouth: Mucous membranes are moist.      Pharynx: Oropharynx is clear. Uvula midline. Eyes:      General: No scleral icterus. Right eye: No discharge. Left eye: No discharge. Neck:      Thyroid: No thyromegaly. Vascular: No carotid bruit. Cardiovascular:      Rate and Rhythm: Normal rate and regular rhythm. Heart sounds: Normal heart sounds, S1 normal and S2 normal. No murmur heard. No friction rub. No gallop. Pulmonary:      Effort: Pulmonary effort is normal. No accessory muscle usage, prolonged expiration or respiratory distress. Breath sounds: Normal breath sounds and air entry. No decreased breath sounds, wheezing or rales. Comments: Lung sounds heard more than 2/3 the way down the back. Abdominal:      Palpations: Abdomen is soft. Tenderness: There is no abdominal tenderness. Musculoskeletal:      Cervical back: Neck supple. Right lower leg: No edema. Left lower leg: No edema. Lymphadenopathy:      Cervical: No cervical adenopathy. Skin:     General: Skin is warm and dry. Neurological:      General: No focal deficit present. Mental Status: He is alert and oriented to person, place, and time. Gait: Gait normal.   Psychiatric:         Attention and Perception: Attention normal.         Mood and Affect: Mood and affect normal.         Speech: Speech normal.         Behavior: Behavior normal. Behavior is cooperative. Thought Content: Thought content normal.         Cognition and Memory: Cognition and memory normal.         Judgment: Judgment normal.                  An electronic signature was used to authenticate this note.     --Morgan Yi MD

## 2021-08-13 ENCOUNTER — OFFICE VISIT (OUTPATIENT)
Dept: FAMILY MEDICINE CLINIC | Age: 61
End: 2021-08-13
Payer: COMMERCIAL

## 2021-08-13 VITALS
SYSTOLIC BLOOD PRESSURE: 122 MMHG | HEIGHT: 73 IN | HEART RATE: 66 BPM | WEIGHT: 188 LBS | TEMPERATURE: 98.7 F | DIASTOLIC BLOOD PRESSURE: 80 MMHG | RESPIRATION RATE: 16 BRPM | BODY MASS INDEX: 24.92 KG/M2 | OXYGEN SATURATION: 98 %

## 2021-08-13 DIAGNOSIS — R73.02 IMPAIRED GLUCOSE TOLERANCE: ICD-10-CM

## 2021-08-13 DIAGNOSIS — D12.6 TUBULAR ADENOMA OF COLON: ICD-10-CM

## 2021-08-13 DIAGNOSIS — Z72.0 TOBACCO USER: ICD-10-CM

## 2021-08-13 DIAGNOSIS — I10 BENIGN ESSENTIAL HYPERTENSION: Primary | ICD-10-CM

## 2021-08-13 DIAGNOSIS — J41.1 CHRONIC BRONCHITIS WITH PRODUCTIVE MUCOPURULENT COUGH (HCC): ICD-10-CM

## 2021-08-13 DIAGNOSIS — I42.9 CARDIOMYOPATHY, UNSPECIFIED TYPE (HCC): Chronic | ICD-10-CM

## 2021-08-13 DIAGNOSIS — I48.91 ATRIAL FIBRILLATION, UNSPECIFIED TYPE (HCC): Chronic | ICD-10-CM

## 2021-08-13 DIAGNOSIS — Z98.890 S/P ABLATION OF ATRIAL FIBRILLATION: Chronic | ICD-10-CM

## 2021-08-13 DIAGNOSIS — C32.0 CARCINOMA OF VOCAL CORD (HCC): ICD-10-CM

## 2021-08-13 DIAGNOSIS — Z86.79 S/P ABLATION OF ATRIAL FIBRILLATION: Chronic | ICD-10-CM

## 2021-08-13 PROCEDURE — 99214 OFFICE O/P EST MOD 30 MIN: CPT | Performed by: FAMILY MEDICINE

## 2021-08-13 RX ORDER — CARVEDILOL 12.5 MG/1
TABLET ORAL
Qty: 180 TABLET | Refills: 1 | Status: SHIPPED
Start: 2021-08-13 | End: 2022-02-25 | Stop reason: SDUPTHER

## 2021-08-13 SDOH — ECONOMIC STABILITY: FOOD INSECURITY: WITHIN THE PAST 12 MONTHS, YOU WORRIED THAT YOUR FOOD WOULD RUN OUT BEFORE YOU GOT MONEY TO BUY MORE.: NEVER TRUE

## 2021-08-13 SDOH — ECONOMIC STABILITY: FOOD INSECURITY: WITHIN THE PAST 12 MONTHS, THE FOOD YOU BOUGHT JUST DIDN'T LAST AND YOU DIDN'T HAVE MONEY TO GET MORE.: NEVER TRUE

## 2021-08-13 ASSESSMENT — SOCIAL DETERMINANTS OF HEALTH (SDOH): HOW HARD IS IT FOR YOU TO PAY FOR THE VERY BASICS LIKE FOOD, HOUSING, MEDICAL CARE, AND HEATING?: NOT HARD AT ALL

## 2021-08-13 ASSESSMENT — ENCOUNTER SYMPTOMS
COUGH: 1
CHEST TIGHTNESS: 0
WHEEZING: 0
SHORTNESS OF BREATH: 0

## 2021-08-16 ENCOUNTER — HOSPITAL ENCOUNTER (OUTPATIENT)
Age: 61
Discharge: HOME | End: 2021-08-18
Payer: COMMERCIAL

## 2021-08-16 ENCOUNTER — HOSPITAL ENCOUNTER (OUTPATIENT)
Dept: GENERAL RADIOLOGY | Age: 61
Discharge: HOME | End: 2021-08-18
Payer: COMMERCIAL

## 2021-08-16 DIAGNOSIS — J41.1 CHRONIC BRONCHITIS WITH PRODUCTIVE MUCOPURULENT COUGH (HCC): ICD-10-CM

## 2021-08-16 PROCEDURE — 71046 X-RAY EXAM CHEST 2 VIEWS: CPT

## 2021-08-17 NOTE — RESULT ENCOUNTER NOTE
Please call patient. His chest x-ray is normal.  If he continues to cough more than normal, please let me know.   nika

## 2021-09-14 ENCOUNTER — OFFICE VISIT (OUTPATIENT)
Dept: ENT CLINIC | Age: 61
End: 2021-09-14
Payer: COMMERCIAL

## 2021-09-14 VITALS — HEIGHT: 73 IN | BODY MASS INDEX: 24.92 KG/M2 | TEMPERATURE: 97.6 F | WEIGHT: 188 LBS

## 2021-09-14 DIAGNOSIS — Z92.3 HISTORY OF RADIATION TO HEAD AND NECK REGION: ICD-10-CM

## 2021-09-14 DIAGNOSIS — C32.0 SQUAMOUS CELL CARCINOMA OF LEFT VOCAL CORD (HCC): Primary | ICD-10-CM

## 2021-09-14 DIAGNOSIS — J30.1 SEASONAL ALLERGIC RHINITIS DUE TO POLLEN: ICD-10-CM

## 2021-09-14 PROCEDURE — 99213 OFFICE O/P EST LOW 20 MIN: CPT | Performed by: OTOLARYNGOLOGY

## 2021-09-14 PROCEDURE — 31575 DIAGNOSTIC LARYNGOSCOPY: CPT | Performed by: OTOLARYNGOLOGY

## 2021-09-14 ASSESSMENT — ENCOUNTER SYMPTOMS
VOICE CHANGE: 1
SHORTNESS OF BREATH: 0
COLOR CHANGE: 0
RESPIRATORY NEGATIVE: 1
ABDOMINAL PAIN: 0
GASTROINTESTINAL NEGATIVE: 1
EYES NEGATIVE: 1

## 2021-09-14 NOTE — PROGRESS NOTES
Subjective:      Patient ID:  Lexie Harris is a 57 y.o. male. HPI:    Patient presents today for recheck cancer Condition has been present for 3 year(s).  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[facility]     12 cups per day    Cardiomyopathy ([facility]     Chelsi Johnson, now back to normal    Chronic bilateral low back pain without sciatica 12/4/2019    Chronic bronchitis ([facility]     Chronic rhinitis     exposed to dust    COPD (chronic obstructive pulmonary disease) ([facility] 5/20/2015    Mild per PFT's 2014 [facility]    History of alcohol abuse     Hyperlipidemia     Hypertension     IGT (impaired glucose tolerance) 11/18/2015    Right shoulder pain     impingement, Duffet    [facility] fibrillation [facility]6/2011    [facility]    Spondylosis of cervical joint     Tobacco abuse      Past Surgical History:   Procedure Laterality Date    COLONOSCOPY  08, 09, 2012 [facility]    2 adenomatous polyps    ECHO COMPL W DOP COLOR FLOW  11/15/2011         HERNIA REPAIR      [place] [address] INCL FLUOR GDNCE DX W/CELL WASHG SPX N/A 9/28/2018    PANDOSCOPY WITH BIOPSY LEFT TRUE VOCAL CORD WITH FROZEN SECTION ++PATHOLOGY NEEDED++ performed by Ketan Chaney DO at [facility]    [place] ESOPHAGOSCOPY FLEXIBLE TRANSORAL DIAGNOSTIC N/A 9/28/2018    ESOPHAGOSCOPY RIGID performed by Ketan Chaney DO at [facility] OR     Family History   Problem Relation Age of Onset    Heart Disease Father 61        smoker    Cancer Brother         colon     Social History     Socioeconomic History    Marital status:      Spouse name: Delvis Yoko Number of children: 2    Years of education: None    Highest education level: None   Occupational History    None   Tobacco Use    Smoking status: Current Every Day Smoker     Packs/day: 0.50     Years: 30.00     Pack years: 15.00 Types: Cigars, Cigarettes     Last attempt to quit: 10/15/2018     Years since quittin.9    Smokeless tobacco: Never Used    Tobacco comment: Quit smoking cigars 2018, started again in    Vaping Use    Vaping Use: Never used   Substance and Sexual Activity    Alcohol use: No    Drug use: No    Sexual activity: Yes     Partners: Female   Other Topics Concern    None   Social History Narrative    None     Social Determinants of Health     Financial Resource Strain: Low Risk     Difficulty of Paying Living Expenses: Not hard at all   Food Insecurity: No Food Insecurity    Worried About [facility] in the Last Year: Never true    [facility] in the Last Year: Never true   Transportation Needs:     Lack of Transportation (Medical):  Lack of Transportation (Non-Medical):    Physical Activity:     Days of Exercise per Week:     Minutes of Exercise per Session:    Stress:     Feeling of Stress :    Social Connections:     Frequency of Communication with Friends and Family:     Frequency of Social Gatherings with Friends and Family:     Attends [employer] Services:     Active Member of Clubs or Organizations:     Attends Club or Organization Meetings:     Marital Status:    Intimate Partner Violence:     Fear of Current or Ex-Partner:     Emotionally Abused:     Physically Abused:     Sexually Abused:      No Known Allergies    Review of Systems   Constitutional: Negative. HENT: Positive for voice change. Eyes: Negative. Negative for visual disturbance. Respiratory: Negative. Negative for shortness of breath. Cardiovascular: Negative. Negative for chest pain. Gastrointestinal: Negative. Negative for abdominal pain. Genitourinary: Negative. Musculoskeletal: Negative. Skin: Negative. Negative for color change. Neurological: Negative. Psychiatric/Behavioral: Negative. Negative for behavioral problems and hallucinations.    All other systems reviewed and are negative. Objective:     Vitals:    09/14/21 1007   Temp: 97.6 °F (36.4 °C)     Physical Exam  Constitutional:       General: He is not in acute distress. Appearance: He is not diaphoretic. HENT:      Head: Normocephalic and atraumatic. Right Ear: Tympanic membrane and external ear normal.      Left Ear: Tympanic membrane and external ear normal.      Mouth/Throat:      Mouth: Mucous membranes are pale. Pharynx: No oropharyngeal exudate. Eyes:      Conjunctiva/sclera: Conjunctivae normal.      Pupils: Pupils are equal, round, and reactive to light. Pulmonary:      Effort: Pulmonary effort is normal. No respiratory distress. Abdominal:      General: There is no distension. Musculoskeletal:         General: Normal range of motion. Cervical back: Normal range of motion and neck supple. Lymphadenopathy:      Cervical: No cervical adenopathy. Skin:     General: Skin is warm and dry. Neurological:      Mental Status: He is alert and oriented to person, place, and time. Endoscopy Procedure Note    Pre-operative Diagnosis: history of throat cancer and tobacco abuse    Post-operative Diagnosis: normal    Indications: Hoarseness, dysphagia or aspiration - not able to be clearly evaluated by indirect laryngoscopy    Anesthesia: Lidocaine 2% and Carrillo-Synephrine 1/2%    Endoscopy Type:  nasal endoscopy, nasopharyngoscopy, laryngoscopy    Procedure Details   With the patient sitting upright in the examining chair informed consent was obtained. The bilateral side(s) of the nose were topically anesthetized with spray. After waiting an appropriate period of time for anesthesia/ vasoconstriction to become effective, the 0-degree  flexible laryngoscope was passed through the right side(s) of the nose, and the nose, nasopharynx, oropharynx, hypopharynx and larynx were examined. An identical procedure was performed on the contralateral side.   Examination was performed during quiet respiration and with phonation. I was present for the entire procedure. The following findings were noted. Findings:  Mucosa:  pale and boggy and erythematous   Nasal septum:  normal   Turbinates:  pale, swollen, edematous   Adenoid:  normal   Eustachian tubes:  normal   Mucous stranding:  present   Lesions:  absent   Modified Anderson's Maneuver not indicated   Larynx Supraglottis, false and true vocal cord were normal.  Vocal cord mobility was normal.  Subglottis is patent. Erythema improved from last visit. Condition:  Stable    Complications:  None    Pictures:                          9/14/21:  Media Information             Assessment:       Diagnosis Orders   1. 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 History of radiation to head and neck region     3. Seasonal allergic rhinitis due to pollen                Plan:      Pt is doing well and the VC look improved on scope today. Follow up in one year possible scope. Discussed tobacco abuse. Electronically signed by Gianna Almonte DO on 9/14/2021 at 10:52 AM                       Bella Douglass  1960    I have discussed the case, including pertinent history and exam findings with the resident. I have seen and examined the patient and the key elements of the encounter have been performed by me. I agree with the assessment, plan and orders as documented by the  resident              Remainder of medical problems as per  resident note. Patient seen and examined. Agree with above exam, assessment and plan.       Electronically signed by Kimi Mabry DO on 9/14/21 at 5:19 PM EDT

## 2021-11-22 ENCOUNTER — TELEPHONE (OUTPATIENT)
Dept: FAMILY MEDICINE CLINIC | Age: 61
End: 2021-11-22

## 2021-11-22 DIAGNOSIS — U07.1 COVID-19: Primary | ICD-10-CM

## 2021-11-22 NOTE — TELEPHONE ENCOUNTER
Paperwork filled out. Script placed in chart as \"no print. \"  Please let patient know. [facility] will call him.

## 2021-11-23 ENCOUNTER — HOSPITAL ENCOUNTER (OUTPATIENT)
Dept: INFUSION THERAPY | Age: 61
Setting detail: INFUSION SERIES
Discharge: HOME | End: 2021-11-23
Payer: COMMERCIAL

## 2021-11-23 VITALS
RESPIRATION RATE: 16 BRPM | SYSTOLIC BLOOD PRESSURE: 121 MMHG | HEART RATE: 58 BPM | OXYGEN SATURATION: 97 % | DIASTOLIC BLOOD PRESSURE: 75 MMHG | TEMPERATURE: 98.4 F

## 2021-11-23 DIAGNOSIS — U07.1 COVID-19: ICD-10-CM

## 2021-11-23 DIAGNOSIS — U07.1 COVID-19: Primary | ICD-10-CM

## 2021-11-23 PROCEDURE — 2580000003 HC RX 258: Performed by: INTERNAL MEDICINE

## 2021-11-23 PROCEDURE — 6360000002 HC RX W HCPCS: Performed by: INTERNAL MEDICINE

## 2021-11-23 PROCEDURE — M0243 CASIRIVI AND IMDEVI INFUSION: HCPCS

## 2021-11-23 RX ORDER — ALBUTEROL SULFATE 90 UG/1
4 AEROSOL, METERED RESPIRATORY (INHALATION) PRN
Status: CANCELLED | OUTPATIENT
Start: 2021-11-23

## 2021-11-23 RX ORDER — HEPARIN SODIUM (PORCINE) LOCK FLUSH IV SOLN 100 UNIT/ML 100 UNIT/ML
500 SOLUTION INTRAVENOUS PRN
Status: CANCELLED | OUTPATIENT
Start: 2021-11-23

## 2021-11-23 RX ORDER — DIPHENHYDRAMINE HYDROCHLORIDE 50 MG/ML
50 INJECTION INTRAMUSCULAR; INTRAVENOUS
OUTPATIENT
Start: 2021-11-23

## 2021-11-23 RX ORDER — SODIUM CHLORIDE 0.9 % (FLUSH) 0.9 %
5-40 SYRINGE (ML) INJECTION PRN
Status: CANCELLED | OUTPATIENT
Start: 2021-11-23

## 2021-11-23 RX ORDER — SODIUM CHLORIDE 9 MG/ML
INJECTION, SOLUTION INTRAVENOUS CONTINUOUS
Status: DISCONTINUED | OUTPATIENT
Start: 2021-11-23 | End: 2021-11-24 | Stop reason: HOSPADM

## 2021-11-23 RX ORDER — HEPARIN SODIUM (PORCINE) LOCK FLUSH IV SOLN 100 UNIT/ML 100 UNIT/ML
500 SOLUTION INTRAVENOUS PRN
OUTPATIENT
Start: 2021-11-23

## 2021-11-23 RX ORDER — SODIUM CHLORIDE 9 MG/ML
25 INJECTION, SOLUTION INTRAVENOUS PRN
OUTPATIENT
Start: 2021-11-23

## 2021-11-23 RX ORDER — EPINEPHRINE 1 MG/ML
0.3 INJECTION, SOLUTION, CONCENTRATE INTRAVENOUS PRN
Status: CANCELLED | OUTPATIENT
Start: 2021-11-23

## 2021-11-23 RX ORDER — ACETAMINOPHEN 325 MG/1
650 TABLET ORAL
Status: CANCELLED | OUTPATIENT
Start: 2021-11-23

## 2021-11-23 RX ORDER — ACETAMINOPHEN 325 MG/1
650 TABLET ORAL
OUTPATIENT
Start: 2021-11-23

## 2021-11-23 RX ORDER — EPINEPHRINE 1 MG/ML
0.3 INJECTION, SOLUTION, CONCENTRATE INTRAVENOUS PRN
OUTPATIENT
Start: 2021-11-23

## 2021-11-23 RX ORDER — ONDANSETRON 2 MG/ML
8 INJECTION INTRAMUSCULAR; INTRAVENOUS
OUTPATIENT
Start: 2021-11-23

## 2021-11-23 RX ORDER — ALBUTEROL SULFATE 90 UG/1
4 AEROSOL, METERED RESPIRATORY (INHALATION) PRN
OUTPATIENT
Start: 2021-11-23

## 2021-11-23 RX ORDER — SODIUM CHLORIDE 9 MG/ML
25 INJECTION, SOLUTION INTRAVENOUS PRN
Status: CANCELLED | OUTPATIENT
Start: 2021-11-23

## 2021-11-23 RX ORDER — SODIUM CHLORIDE 0.9 % (FLUSH) 0.9 %
5-40 SYRINGE (ML) INJECTION PRN
Status: DISCONTINUED | OUTPATIENT
Start: 2021-11-23 | End: 2021-11-24 | Stop reason: HOSPADM

## 2021-11-23 RX ORDER — SODIUM CHLORIDE 9 MG/ML
INJECTION, SOLUTION INTRAVENOUS CONTINUOUS
Status: CANCELLED | OUTPATIENT
Start: 2021-11-23

## 2021-11-23 RX ORDER — ONDANSETRON 2 MG/ML
8 INJECTION INTRAMUSCULAR; INTRAVENOUS
Status: CANCELLED | OUTPATIENT
Start: 2021-11-23

## 2021-11-23 RX ORDER — SODIUM CHLORIDE 9 MG/ML
INJECTION, SOLUTION INTRAVENOUS CONTINUOUS
OUTPATIENT
Start: 2021-11-23

## 2021-11-23 RX ORDER — DIPHENHYDRAMINE HYDROCHLORIDE 50 MG/ML
50 INJECTION INTRAMUSCULAR; INTRAVENOUS
Status: CANCELLED | OUTPATIENT
Start: 2021-11-23

## 2021-11-23 RX ADMIN — SODIUM CHLORIDE, PRESERVATIVE FREE 10 ML: 5 INJECTION INTRAVENOUS at 17:30

## 2021-11-23 RX ADMIN — CASIRIVIMAB AND IMDEVIMAB: 600; 600 INJECTION, SOLUTION, CONCENTRATE INTRAVENOUS at 17:00

## 2021-11-23 RX ADMIN — SODIUM CHLORIDE: 9 INJECTION, SOLUTION INTRAVENOUS at 16:57

## 2021-11-23 RX ADMIN — SODIUM CHLORIDE, PRESERVATIVE FREE 10 ML: 5 INJECTION INTRAVENOUS at 16:57

## 2021-11-23 NOTE — PROGRESS NOTES
Patient arrived to receive Casirivimab/Imdevimab infusion. Patient was given education handouts on the medication and information sheet on 10 things to do when you have COVID-19. Patient was explained the risk and benefits of receiving the infusion. Verbal consent was obtained.    Tex Espinoza RN  11/23/2021  [id number]

## 2021-11-23 NOTE — PROGRESS NOTES
Patient tolerated Casirivimab/Imdevimab infusion well. Discharged from infusion services. Instructed to notify their PCP they had this infusion and instructed on signs/symptoms to report to physician and to utilize the emergency room if worsening of symptoms, patient verbalizes understanding.

## 2022-02-25 DIAGNOSIS — I48.91 ATRIAL FIBRILLATION, UNSPECIFIED TYPE (HCC): Chronic | ICD-10-CM

## 2022-02-25 DIAGNOSIS — I42.9 CARDIOMYOPATHY, UNSPECIFIED TYPE (HCC): Chronic | ICD-10-CM

## 2022-02-25 RX ORDER — CARVEDILOL 12.5 MG/1
TABLET ORAL
Qty: 180 TABLET | Refills: 1 | Status: SHIPPED
Start: 2022-02-25 | End: 2022-08-24 | Stop reason: SDUPTHER

## 2022-08-19 DIAGNOSIS — I42.9 CARDIOMYOPATHY, UNSPECIFIED TYPE (HCC): Chronic | ICD-10-CM

## 2022-08-19 DIAGNOSIS — I48.91 ATRIAL FIBRILLATION, UNSPECIFIED TYPE (HCC): Chronic | ICD-10-CM

## 2022-08-23 DIAGNOSIS — I42.9 CARDIOMYOPATHY, UNSPECIFIED TYPE (HCC): Chronic | ICD-10-CM

## 2022-08-23 DIAGNOSIS — I48.91 ATRIAL FIBRILLATION, UNSPECIFIED TYPE (HCC): Chronic | ICD-10-CM

## 2022-08-23 NOTE — TELEPHONE ENCOUNTER
Patient needs refill sent to [facility] caredilol 12.5 mg last appt 8-13-21 no future apt seen.

## 2022-08-24 RX ORDER — CARVEDILOL 12.5 MG/1
TABLET ORAL
Qty: 60 TABLET | Refills: 0 | Status: SHIPPED
Start: 2022-08-24 | End: 2022-09-02

## 2022-08-24 NOTE — TELEPHONE ENCOUNTER
Pt needs an appointment. I filled script for 30 days. Will fill more once appt is made if he needs more to get to the appointment.   nika

## 2022-09-01 ENCOUNTER — TELEPHONE (OUTPATIENT)
Dept: FAMILY MEDICINE CLINIC | Age: 62
End: 2022-09-01

## 2022-09-01 NOTE — TELEPHONE ENCOUNTER
Pt needs an appointment to get his BP checked. His wife is having surgery soon so he will likely want to wait until after this. I have not seen him in a while. He can also be seen by one of the residents if necessary. Thanks.

## 2022-09-02 RX ORDER — CARVEDILOL 12.5 MG/1
TABLET ORAL
Qty: 180 TABLET | Refills: 1 | Status: SHIPPED
Start: 2022-09-02 | End: 2022-09-19

## 2022-09-18 DIAGNOSIS — I42.9 CARDIOMYOPATHY, UNSPECIFIED TYPE (HCC): Chronic | ICD-10-CM

## 2022-09-18 DIAGNOSIS — I48.91 ATRIAL FIBRILLATION, UNSPECIFIED TYPE (HCC): Chronic | ICD-10-CM

## 2022-09-19 RX ORDER — CARVEDILOL 12.5 MG/1
TABLET ORAL
Qty: 180 TABLET | Refills: 0 | Status: SHIPPED | OUTPATIENT
Start: 2022-09-19

## 2022-09-20 ENCOUNTER — OFFICE VISIT (OUTPATIENT)
Dept: ENT CLINIC | Age: 62
End: 2022-09-20
Payer: COMMERCIAL

## 2022-09-20 VITALS
HEART RATE: 64 BPM | BODY MASS INDEX: 24.01 KG/M2 | WEIGHT: 182 LBS | DIASTOLIC BLOOD PRESSURE: 84 MMHG | SYSTOLIC BLOOD PRESSURE: 125 MMHG

## 2022-09-20 DIAGNOSIS — C32.0 SQUAMOUS CELL CARCINOMA OF LEFT VOCAL CORD (HCC): Primary | ICD-10-CM

## 2022-09-20 DIAGNOSIS — C32.0 CANCER OF GLOTTIS (HCC): ICD-10-CM

## 2022-09-20 DIAGNOSIS — Z92.3 HISTORY OF RADIATION TO HEAD AND NECK REGION: ICD-10-CM

## 2022-09-20 PROCEDURE — 99213 OFFICE O/P EST LOW 20 MIN: CPT | Performed by: OTOLARYNGOLOGY

## 2022-09-20 PROCEDURE — 31575 DIAGNOSTIC LARYNGOSCOPY: CPT | Performed by: OTOLARYNGOLOGY

## 2022-09-20 ASSESSMENT — ENCOUNTER SYMPTOMS
RESPIRATORY NEGATIVE: 1
SHORTNESS OF BREATH: 0
EYES NEGATIVE: 1
ABDOMINAL PAIN: 0
VOICE CHANGE: 1
COLOR CHANGE: 0
GASTROINTESTINAL NEGATIVE: 1

## 2022-09-20 NOTE — PROGRESS NOTES
Subjective:      Patient ID:  Yannick Dash is a 60 y.o. male. HPI:    Patient presents today for recheck cancer Condition has been present for 4 year(s).  Pt is not having any issues at this time except for allergy drainage, not using flonase currently        Past Medical History:   Diagnosis Date    Afib ([facility] [address]     Anxiety     Caffeine abuse ([facility] [address]     12 cups per day    Cardiomyopathy ([facility] [address]     Antonio Chery, now back to normal    Chronic bilateral low back pain without sciatica 12/4/2019    Chronic bronchitis ([facility] [address]     Chronic rhinitis     exposed to dust    COPD (chronic obstructive pulmonary disease) ([facility] [address] 5/20/2015    Mild per PFT's 2014 [facility]    History of alcohol abuse     Hyperlipidemia     Hypertension     IGT (impaired glucose tolerance) 11/18/2015    Right shoulder pain     impingement, Duffet    S/P ablation of atrial fibrillation 12/6/2011    ablation at [facility]    Spondylosis of cervical joint     Tobacco abuse      Past Surgical History:   Procedure Laterality Date    COLONOSCOPY  08, 09, 2012 [facility]    2 adenomatous polyps    ECHO COMPL W DOP COLOR FLOW  11/15/2011         HERNIA REPAIR      [facility] [address] INCL FLUOR GDNCE DX W/CELL WASHG SPX N/A 9/28/2018    PANDOSCOPY WITH BIOPSY LEFT TRUE VOCAL CORD WITH FROZEN SECTION ++PATHOLOGY NEEDED++ performed by Chirag Root DO at [address] TRANSORAL DIAGNOSTIC N/A 9/28/2018    ESOPHAGOSCOPY RIGID performed by Chirag Root DO at [facility] OR     Family History   Problem Relation Age of Onset    Heart Disease Father 61        smoker    Cancer Brother         colon     Social History     Socioeconomic History    Marital status:      Spouse name: Farzaneh Zamarripa     Number of children: 2    Years of education: None    Highest education level: None   Tobacco Use    Smoking status: Every Day     Packs/day: 0.50     Years: 30.00     Pack years: 15.00     Types: Cigars, Cigarettes     Last attempt to quit: 10/15/2018     Years since quitting: 3.9    Smokeless tobacco: Never    Tobacco comments:     Quit smoking cigars October 22, 2018, started again in 2020   Vaping Use    Vaping Use: Never used   Substance and Sexual Activity    Alcohol use: No    Drug use: No    Sexual activity: Yes     Partners: Female     No Known Allergies    Review of Systems   Constitutional: Negative. HENT:  Positive for voice change. Eyes: Negative. Negative for visual disturbance. Respiratory: Negative. Negative for shortness of breath. Cardiovascular: Negative. Negative for chest pain. Gastrointestinal: Negative. Negative for abdominal pain. Genitourinary: Negative. Musculoskeletal: Negative. Skin: Negative. Negative for color change. Neurological: Negative. Psychiatric/Behavioral: Negative. Negative for behavioral problems and hallucinations. All other systems reviewed and are negative. Objective:     Vitals:    09/20/22 0906   BP: 125/84   Pulse: 64     Physical Exam  Constitutional:       General: He is not in acute distress. Appearance: He is not diaphoretic. HENT:      Head: Normocephalic and atraumatic. Right Ear: Tympanic membrane and external ear normal.      Left Ear: Tympanic membrane and external ear normal.      Mouth/Throat:      Mouth: Mucous membranes are pale. Pharynx: No oropharyngeal exudate. Eyes:      Conjunctiva/sclera: Conjunctivae normal.      Pupils: Pupils are equal, round, and reactive to light. Pulmonary:      Effort: Pulmonary effort is normal. No respiratory distress. Abdominal:      General: There is no distension. Musculoskeletal:         General: Normal range of motion. Cervical back: Normal range of motion and neck supple. Lymphadenopathy:      Cervical: No cervical adenopathy. Skin:     General: Skin is warm and dry. Neurological:      Mental Status: He is alert and oriented to person, place, and time.        Endoscopy Procedure Note    Pre-operative Diagnosis: history of throat cancer and tobacco abuse    Post-operative Diagnosis: normal    Indications: Hoarseness, dysphagia or aspiration - not able to be clearly evaluated by indirect laryngoscopy    Anesthesia: Lidocaine 2% and Carrillo-Synephrine 1/2%    Endoscopy Type:  nasal endoscopy, nasopharyngoscopy, laryngoscopy    Procedure Details   With the patient sitting upright in the examining chair informed consent was obtained. The bilateral side(s) of the nose were topically anesthetized with spray. After waiting an appropriate period of time for anesthesia/ vasoconstriction to become effective, the 0-degree  flexible laryngoscope was passed through the right side(s) of the nose, and the nose, nasopharynx, oropharynx, hypopharynx and larynx were examined. An identical procedure was performed on the contralateral side. Examination was performed during quiet respiration and with phonation. I was present for the entire procedure. The following findings were noted. Findings:  Mucosa:  pale and boggy and erythematous   Nasal septum:  normal   Turbinates:  pale, swollen, edematous   Adenoid:  normal   Eustachian tubes:  normal   Mucous stranding:  present   Lesions:  absent   Modified Anderson's Maneuver not indicated   Larynx Supraglottis, false and true vocal cord were normal.  Vocal cord mobility was normal.  Subglottis is patent. Erythema improved from last visit. Condition:  Stable    Complications:  None    Pictures:                                  Assessment:       Diagnosis Orders   1. 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 History of radiation to head and neck region        3. Cancer of glottis [facility])                   Plan:      Pt is doing well and the VC look improved on scope today. No evidence of recurrence   Follow up in 12 month(s) possible scope. Discussed tobacco abuse.                       Javed Harvey  1960    I have discussed the case, including pertinent history and exam findings with the resident. I have seen and examined the patient and the key elements of the encounter have been performed by me. I agree with the assessment, plan and orders as documented by the  resident              Remainder of medical problems as per  resident note. Patient seen and examined. Agree with above exam, assessment and plan.       Electronically signed by Michael Messina DO on 10/4/22 at 11:20 AM EDT

## 2022-12-14 DIAGNOSIS — I42.9 CARDIOMYOPATHY, UNSPECIFIED TYPE (HCC): Chronic | ICD-10-CM

## 2022-12-14 DIAGNOSIS — I48.91 ATRIAL FIBRILLATION, UNSPECIFIED TYPE (HCC): Chronic | ICD-10-CM

## 2022-12-14 RX ORDER — CARVEDILOL 12.5 MG/1
TABLET ORAL
Qty: 180 TABLET | Refills: 0 | Status: SHIPPED | OUTPATIENT
Start: 2022-12-14

## 2023-01-26 NOTE — PROGRESS NOTES
Lisbet Hathaway (:  1960) is a 60 y.o. male,Established patient, here for evaluation of the following chief complaint(s): Annual Exam         ASSESSMENT/PLAN:  1. Atrial fibrillation, unspecified type (HCC)  -     carvedilol (COREG) 12.5 MG tablet; Take 1 tablet by mouth 2 times daily, Disp-180 tablet, R-3Normal  Labs ordered. Pt cont on coreg and ASA. Stable currently. -     CBC with Auto Differential; Future  -     Comprehensive Metabolic Panel; Future  -     LIPID PANEL; Future  -     TSH; Future  2 Benign essential hypertension  -     carvedilol (COREG) 12.5 MG tablet; Take 1 tablet by mouth 2 times daily, Disp-180 tablet, R-3Normal  3. Carcinoma of vocal cord ([facility] [address] - pt follows with ENT. No current symptoms of change in voice, throat pain, etc.  Pt ed on tobacco cessation. Pt not currently interested. 4 Chronic bronchitis with productive mucopurulent cough ([facility] [address] - pt stopped loratidine. Doing much better. Ed to use mucinex if needed. Stable. Has cough in AM only. Ed on quitting tobacco.  Pt not currently interested. 5 Impaired glucose tolerance - labs ordered. 6. Caffeine abuse ([facility] [address] - pt ed. He drinks espresso. No interseted in stopped. No symptoms. HR WNL. No palpatations. BP ok.   7. Tubular adenoma of colon  -     CBC with Auto Differential; Future  Pt needs colonoscopy this year. Had abnormal colonoscopy done by Dr. Araceli Lozoya in . Return in about 1 year (around 2024) for wellness visit. Subjective   SUBJECTIVE/OBJECTIVE:  HPI    Afib with HTN - on coreg,ASA. . s/p ablation  Still using caffiene? Drinks a lot of espresso. Just came back from [place] [facility]. Grew up on it. Carcionma of vocal cord - follows with ENT. Has yearly follow up. Still using tobacco.  Yes and no. .  He does not drink. Enjoys his coffee and smoke. Smokes 10-15 a day. If he wants to quit he will quit on his own. Broncitis? ? Doesn't cough every day.   When he has phlegm once a week he has bad cough. Since off loratidine he is better and can smell better and he is coughing less. He had squamous cell removed from his head on dermatology. He is still working. He is a . He is off for the winter. Does not want flu vaccine today. Review of Systems   Constitutional:  Negative for activity change, appetite change, chills, fatigue, fever and unexpected weight change. He decided to lose weight. He walks 8 miles a day - at mall. He eats once day. HENT:  Negative for congestion, postnasal drip and rhinorrhea. Eyes:  Negative for discharge and itching. Respiratory:  Positive for cough. Negative for chest tightness, shortness of breath and wheezing. Cardiovascular:  Negative for chest pain, palpitations and leg swelling. Gastrointestinal:  Negative for abdominal pain, blood in stool, constipation, diarrhea and vomiting. Genitourinary:  Negative for dysuria and frequency. Musculoskeletal:  Negative for arthralgias, back pain, gait problem, joint swelling and myalgias. Skin:  Negative for rash. Neurological:  Negative for dizziness, syncope and light-headedness. Psychiatric/Behavioral:  Negative for dysphoric mood. The patient is not nervous/anxious. Objective   Physical Exam  Vitals and nursing note reviewed. Constitutional:       Appearance: Normal appearance. He is well-developed. He is not ill-appearing or toxic-appearing. HENT:      Head: Normocephalic and atraumatic. Eyes:      General: No scleral icterus. Right eye: No discharge. Left eye: No discharge. Neck:      Thyroid: No thyromegaly. Vascular: No carotid bruit. Cardiovascular:      Rate and Rhythm: Normal rate and regular rhythm. Heart sounds: Normal heart sounds, S1 normal and S2 normal. No murmur heard. No friction rub. No gallop. Pulmonary:      Effort: Pulmonary effort is normal. No respiratory distress.       Breath sounds: Normal breath sounds and air entry. No decreased breath sounds, wheezing or rales. Abdominal:      Palpations: Abdomen is soft. Tenderness: There is no abdominal tenderness. Musculoskeletal:      Cervical back: Neck supple. Right lower leg: No edema. Left lower leg: No edema. Lymphadenopathy:      Cervical: No cervical adenopathy. Skin:     General: Skin is warm and dry. Neurological:      General: No focal deficit present. Mental Status: He is alert and oriented to person, place, and time. Gait: Gait normal.   Psychiatric:         Attention and Perception: Attention normal.         Mood and Affect: Mood and affect normal.         Speech: Speech normal.         Behavior: Behavior normal. Behavior is cooperative. Thought Content: Thought content normal.         Cognition and Memory: Cognition and memory normal.         Judgment: Judgment normal.                An electronic signature was used to authenticate this note.     --José Best MD

## 2023-01-27 ENCOUNTER — OFFICE VISIT (OUTPATIENT)
Dept: FAMILY MEDICINE CLINIC | Age: 63
End: 2023-01-27
Payer: COMMERCIAL

## 2023-01-27 ENCOUNTER — TELEPHONE (OUTPATIENT)
Dept: FAMILY MEDICINE CLINIC | Age: 63
End: 2023-01-27

## 2023-01-27 VITALS
OXYGEN SATURATION: 98 % | WEIGHT: 190 LBS | TEMPERATURE: 98.2 F | HEIGHT: 73 IN | BODY MASS INDEX: 25.18 KG/M2 | SYSTOLIC BLOOD PRESSURE: 134 MMHG | DIASTOLIC BLOOD PRESSURE: 85 MMHG | RESPIRATION RATE: 18 BRPM | HEART RATE: 66 BPM

## 2023-01-27 DIAGNOSIS — D12.6 TUBULAR ADENOMA OF COLON: ICD-10-CM

## 2023-01-27 DIAGNOSIS — R73.02 IMPAIRED GLUCOSE TOLERANCE: ICD-10-CM

## 2023-01-27 DIAGNOSIS — R63.4 WEIGHT LOSS: ICD-10-CM

## 2023-01-27 DIAGNOSIS — C32.0 CARCINOMA OF VOCAL CORD (HCC): ICD-10-CM

## 2023-01-27 DIAGNOSIS — J41.1 CHRONIC BRONCHITIS WITH PRODUCTIVE MUCOPURULENT COUGH (HCC): ICD-10-CM

## 2023-01-27 DIAGNOSIS — I10 BENIGN ESSENTIAL HYPERTENSION: ICD-10-CM

## 2023-01-27 DIAGNOSIS — I48.91 ATRIAL FIBRILLATION, UNSPECIFIED TYPE (HCC): Chronic | ICD-10-CM

## 2023-01-27 DIAGNOSIS — F15.10 CAFFEINE ABUSE (HCC): ICD-10-CM

## 2023-01-27 PROBLEM — U07.1 COVID-19: Status: RESOLVED | Noted: 2021-11-23 | Resolved: 2023-01-27

## 2023-01-27 LAB
ALBUMIN SERPL-MCNC: 4.3 G/DL (ref 3.5–5.2)
ALP BLD-CCNC: 55 U/L (ref 40–129)
ALT SERPL-CCNC: 17 U/L (ref 0–40)
ANION GAP SERPL CALCULATED.3IONS-SCNC: 15 MMOL/L (ref 7–16)
AST SERPL-CCNC: 18 U/L (ref 0–39)
BASOPHILS ABSOLUTE: 0.03 E9/L (ref 0–0.2)
BASOPHILS RELATIVE PERCENT: 0.4 % (ref 0–2)
BILIRUB SERPL-MCNC: 0.3 MG/DL (ref 0–1.2)
BUN BLDV-MCNC: 14 MG/DL (ref 6–23)
CALCIUM SERPL-MCNC: 9.8 MG/DL (ref 8.6–10.2)
CHLORIDE BLD-SCNC: 105 MMOL/L (ref 98–107)
CHOLESTEROL, TOTAL: 253 MG/DL (ref 0–199)
CO2: 24 MMOL/L (ref 22–29)
CREAT SERPL-MCNC: 0.9 MG/DL (ref 0.7–1.2)
EOSINOPHILS ABSOLUTE: 0.21 E9/L (ref 0.05–0.5)
EOSINOPHILS RELATIVE PERCENT: 2.5 % (ref 0–6)
GFR SERPL CREATININE-BSD FRML MDRD: >60 ML/MIN/1.73
GLUCOSE BLD-MCNC: 111 MG/DL (ref 74–99)
HCT VFR BLD CALC: 43.6 % (ref 37–54)
HDLC SERPL-MCNC: 56 MG/DL
HEMOGLOBIN: 14.7 G/DL (ref 12.5–16.5)
IMMATURE GRANULOCYTES #: 0.02 E9/L
IMMATURE GRANULOCYTES %: 0.2 % (ref 0–5)
LDL CHOLESTEROL CALCULATED: 179 MG/DL (ref 0–99)
LYMPHOCYTES ABSOLUTE: 2.2 E9/L (ref 1.5–4)
LYMPHOCYTES RELATIVE PERCENT: 25.7 % (ref 20–42)
MCH RBC QN AUTO: 32.2 PG (ref 26–35)
MCHC RBC AUTO-ENTMCNC: 33.7 % (ref 32–34.5)
MCV RBC AUTO: 95.4 FL (ref 80–99.9)
MONOCYTES ABSOLUTE: 0.58 E9/L (ref 0.1–0.95)
MONOCYTES RELATIVE PERCENT: 6.8 % (ref 2–12)
NEUTROPHILS ABSOLUTE: 5.52 E9/L (ref 1.8–7.3)
NEUTROPHILS RELATIVE PERCENT: 64.4 % (ref 43–80)
PDW BLD-RTO: 13.2 FL (ref 11.5–15)
PLATELET # BLD: 290 E9/L (ref 130–450)
PMV BLD AUTO: 9.4 FL (ref 7–12)
POTASSIUM SERPL-SCNC: 4.9 MMOL/L (ref 3.5–5)
RBC # BLD: 4.57 E12/L (ref 3.8–5.8)
SODIUM BLD-SCNC: 144 MMOL/L (ref 132–146)
TOTAL PROTEIN: 7.7 G/DL (ref 6.4–8.3)
TRIGL SERPL-MCNC: 89 MG/DL (ref 0–149)
TSH SERPL DL<=0.05 MIU/L-ACNC: 1.64 UIU/ML (ref 0.27–4.2)
VLDLC SERPL CALC-MCNC: 18 MG/DL
WBC # BLD: 8.6 E9/L (ref 4.5–11.5)

## 2023-01-27 PROCEDURE — 3079F DIAST BP 80-89 MM HG: CPT | Performed by: FAMILY MEDICINE

## 2023-01-27 PROCEDURE — 99214 OFFICE O/P EST MOD 30 MIN: CPT | Performed by: FAMILY MEDICINE

## 2023-01-27 PROCEDURE — 3075F SYST BP GE 130 - 139MM HG: CPT | Performed by: FAMILY MEDICINE

## 2023-01-27 RX ORDER — CARVEDILOL 12.5 MG/1
12.5 TABLET ORAL 2 TIMES DAILY
Qty: 180 TABLET | Refills: 3 | Status: SHIPPED | OUTPATIENT
Start: 2023-01-27

## 2023-01-27 SDOH — ECONOMIC STABILITY: FOOD INSECURITY: WITHIN THE PAST 12 MONTHS, YOU WORRIED THAT YOUR FOOD WOULD RUN OUT BEFORE YOU GOT MONEY TO BUY MORE.: NEVER TRUE

## 2023-01-27 SDOH — ECONOMIC STABILITY: FOOD INSECURITY: WITHIN THE PAST 12 MONTHS, THE FOOD YOU BOUGHT JUST DIDN'T LAST AND YOU DIDN'T HAVE MONEY TO GET MORE.: NEVER TRUE

## 2023-01-27 ASSESSMENT — SOCIAL DETERMINANTS OF HEALTH (SDOH): HOW HARD IS IT FOR YOU TO PAY FOR THE VERY BASICS LIKE FOOD, HOUSING, MEDICAL CARE, AND HEATING?: NOT HARD AT ALL

## 2023-01-27 ASSESSMENT — ENCOUNTER SYMPTOMS
EYE ITCHING: 0
DIARRHEA: 0
CONSTIPATION: 0
WHEEZING: 0
VOMITING: 0
RHINORRHEA: 0
CHEST TIGHTNESS: 0
COUGH: 1
SHORTNESS OF BREATH: 0
BLOOD IN STOOL: 0
BACK PAIN: 0
ABDOMINAL PAIN: 0
EYE DISCHARGE: 0

## 2023-01-27 ASSESSMENT — PATIENT HEALTH QUESTIONNAIRE - PHQ9
SUM OF ALL RESPONSES TO PHQ QUESTIONS 1-9: 0
SUM OF ALL RESPONSES TO PHQ9 QUESTIONS 1 & 2: 0
1. LITTLE INTEREST OR PLEASURE IN DOING THINGS: 0
SUM OF ALL RESPONSES TO PHQ QUESTIONS 1-9: 0
SUM OF ALL RESPONSES TO PHQ QUESTIONS 1-9: 0
2. FEELING DOWN, DEPRESSED OR HOPELESS: 0
SUM OF ALL RESPONSES TO PHQ QUESTIONS 1-9: 0

## 2023-01-27 NOTE — TELEPHONE ENCOUNTER
Please call patient. He needs to call Dr. Jones Gannon to schedule his colonoscopy this year. He is due. He was supposed to get it done 3 years after the last one which was July 2020. Thanks.   nika

## 2023-01-27 NOTE — PATIENT INSTRUCTIONS
Walk with the Doc - Last Monday of the month at [facility] in [facility] at noon (Jan 30th).

## 2023-01-29 DIAGNOSIS — E78.2 MIXED HYPERLIPIDEMIA: Primary | ICD-10-CM

## 2023-01-29 RX ORDER — ROSUVASTATIN CALCIUM 10 MG/1
10 TABLET, COATED ORAL NIGHTLY
Qty: 90 TABLET | Refills: 3 | Status: SHIPPED | OUTPATIENT
Start: 2023-01-29

## 2023-01-30 ENCOUNTER — TELEPHONE (OUTPATIENT)
Dept: FAMILY MEDICINE CLINIC | Age: 63
End: 2023-01-30

## 2023-01-30 NOTE — TELEPHONE ENCOUNTER
Last Appointment   1/27/2023  Next Appointment  3/1/2023  Patient would like to talk to you regarding his lab work and the medication you want to start him, he read the side effects.

## 2023-01-31 ENCOUNTER — TELEPHONE (OUTPATIENT)
Dept: FAMILY MEDICINE CLINIC | Age: 63
End: 2023-01-31

## 2023-01-31 NOTE — TELEPHONE ENCOUNTER
The 10-year ASCVD risk score (Trev HATCH, et al., 2019) is: 21.9%    Values used to calculate the score:      Age: 60 years      Sex: Male      Is Non- : No      Diabetic: No      Tobacco smoker: Yes      Systolic Blood Pressure: 094 mmHg      Is BP treated: Yes      HDL Cholesterol: 56 mg/dL      Total Cholesterol: 253 mg/dL      Called and talked with patient. He does not want to start on the medicine. Ed him that quiting smoking is the biggest thing that will lower his risk of MI/stroke. Will print out information on diet that will help to lower cholesterol. He will work with this for a few months and we will recheck his levels and discuss again at that time.     niak

## 2023-02-21 ENCOUNTER — TELEPHONE (OUTPATIENT)
Dept: PODIATRY | Age: 63
End: 2023-02-21

## 2023-02-21 NOTE — TELEPHONE ENCOUNTER
Patient LOV 11/2019- wife calling to schedule for great toenail infection. No soon appointments to offer. Please contact patient to schedule.

## 2023-02-21 NOTE — TELEPHONE ENCOUNTER
[facility] in [place] office left pt alejandro mess

## 2023-02-22 ENCOUNTER — OFFICE VISIT (OUTPATIENT)
Dept: PODIATRY | Age: 63
End: 2023-02-22
Payer: COMMERCIAL

## 2023-02-22 VITALS
WEIGHT: 190 LBS | SYSTOLIC BLOOD PRESSURE: 134 MMHG | BODY MASS INDEX: 25.07 KG/M2 | DIASTOLIC BLOOD PRESSURE: 84 MMHG | TEMPERATURE: 97.4 F

## 2023-02-22 DIAGNOSIS — M79.671 PAIN IN RIGHT FOOT: Primary | ICD-10-CM

## 2023-02-22 DIAGNOSIS — S90.211A CONTUSION OF RIGHT GREAT TOE WITH DAMAGE TO NAIL, INITIAL ENCOUNTER: ICD-10-CM

## 2023-02-22 PROCEDURE — 3079F DIAST BP 80-89 MM HG: CPT | Performed by: PODIATRIST

## 2023-02-22 PROCEDURE — 3075F SYST BP GE 130 - 139MM HG: CPT | Performed by: PODIATRIST

## 2023-02-22 PROCEDURE — 99213 OFFICE O/P EST LOW 20 MIN: CPT | Performed by: PODIATRIST

## 2023-02-22 RX ORDER — CEPHALEXIN 500 MG/1
500 CAPSULE ORAL 2 TIMES DAILY
Qty: 20 CAPSULE | Refills: 0 | Status: SHIPPED | OUTPATIENT
Start: 2023-02-22 | End: 2023-03-04

## 2023-02-22 NOTE — PROGRESS NOTES
23  Cheryle Flax : 1960 Sex: male  Age: 60 y.o. Patient was referred by Cipriano Ro MD    Chief Complaint   Patient presents with    Foot Pain    Toe Pain     Right foot and toe pain  Patient says he stands on the right toes for hours and hours at work (). Issue started one week ago. PCP is Dr. Cipriano Ro, last seen 2023       SUBJECTIVE patient is known to the practice is seen today for right great toe pain. Patient relates that he is a  he was on a roof for well over 12 hours and he thinks he just put pressure on it he has had this issue in the past.  HPI  Review of Systems  Const: Denies constitutional symptoms  Musculo: Denies symptoms other than stated above  Skin: Denies symptoms other than stated above       Current Outpatient Medications:     cephALEXin (KEFLEX) 500 MG capsule, Take 1 capsule by mouth 2 times daily for 10 days, Disp: 20 capsule, Rfl: 0    carvedilol (COREG) 12.5 MG tablet, Take 1 tablet by mouth 2 times daily, Disp: 180 tablet, Rfl: 3    aspirin 81 MG tablet, Take 81 mg by mouth daily. , Disp: , Rfl:   No Known Allergies    Past Medical History:   Diagnosis Date    Afib ([address]     Anxiety     Caffeine abuse ([address]     12 cups per day    Cardiomyopathy ([address]     Chucky Moulding, now back to normal    Chronic bilateral low back pain without sciatica 2019    Chronic bronchitis ([address]     Chronic rhinitis     exposed to dust    COPD (chronic obstructive pulmonary disease) ([address] 2015    Mild per PFT's 2014 [facility]    COVID-19 2021    Diagnosis added to problem list by Charley Jacobsen based on transcribed order from Dr. Kishor Lauren. History of alcohol abuse     Hyperlipidemia     Hypertension     IGT (impaired glucose tolerance) 2015    Other primary cardiomyopathies 10/26/2011    Non-ischemic Cardiomyopathy Sees cardio - last visit: unknown. Miguel.       Right shoulder pain     impingement, Duffet    S[facility] fibrillation [facility]    Spondylosis of cervical joint     Tobacco abuse      Past Surgical History:   Procedure Laterality Date    COLONOSCOPY  2012 [facility]    2 adenomatous polyps    ECHO COMPL W DOP COLOR FLOW  11/15/2011         HERNIA REPAIR      [facility] [address] INCL FLUOR GDNCE DX W/CELL WASHG SPX N/A 2018    PANDOSCOPY WITH BIOPSY LEFT TRUE VOCAL CORD WITH FROZEN SECTION ++PATHOLOGY NEEDED++ performed by Gabe Yun DO at [address] TRANSORAL DIAGNOSTIC N/A 2018    ESOPHAGOSCOPY RIGID performed by Gabe Yun DO at [facility] OR     Family History   Problem Relation Age of Onset    Heart Disease Father 61        smoker    Cancer Brother         colon     Social History     Socioeconomic History    Marital status:      Spouse name: Manuel Rizzo     Number of children: 2    Years of education: Not on file    Highest education level: Not on file   Occupational History    Not on file   Tobacco Use    Smoking status: Every Day     Packs/day: 0.50     Years: 30.00     Pack years: 15.00     Types: Cigars, Cigarettes     Last attempt to quit: 10/15/2018     Years since quittin.3    Smokeless tobacco: Never    Tobacco comments:     Quit smoking cigars 2018, started again in    Vaping Use    Vaping Use: Never used   Substance and Sexual Activity    Alcohol use: No    Drug use: No    Sexual activity: Yes     Partners: Female   Other Topics Concern    Not on file   Social History Narrative    Not on file     Social Determinants of Health     Financial Resource Strain: Low Risk     Difficulty of Paying Living Expenses: Not hard at all   Food Insecurity: No Food Insecurity    Worried About Running Out of Food in the Last Year: Never true    Ran Out of Food in the Last Year: Never true   Transportation Needs: Not on file   Physical Activity: Not on file   Stress: Not on file   Social Connections: Not on file   Intimate Partner Violence: Not on file   Housing Stability: Not on file       Vitals:    02/22/23 0950   BP: 134/84   Temp: 97.4 °F (36.3 °C)   TempSrc: Temporal   Weight: 190 lb (86.2 kg)       Focused Lower Extremity Physical Exam:  Vitals:    02/22/23 0950   BP: 134/84   Temp: 97.4 °F (36.3 °C)        Foot Exam     Ortho Exam    Vascular: pulses  dp  pt palpable  Capillary Refill Time:   Hair growth  Skin:    Edema:    Neurologic:      Musculoskeletal/ Orthopedic examination:    NAIL the right hallux nail is thick pitting mycotic loose the proximal nail fold is slightly painful no drainage noted mild erythematous. 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 Assessment and Plan: Today I debrided the right hallux nail back to a firm nail. Patient was placed on Keflex 1 pill twice a day for 10 days warm water Epsom salt soaks bacitracin and a Band-Aid for 10 days  Gina De Jesus was seen today for foot pain and toe pain. Diagnoses and all orders for this visit:    Pain in right foot  -     XR FOOT RIGHT (MIN 3 VIEWS); Future    Contusion of right great toe with damage to nail, initial encounter    Other orders  -     cephALEXin (KEFLEX) 500 MG capsule; Take 1 capsule by mouth 2 times daily for 10 days      No follow-ups on file. Seen By:  Alcira Saavedra DPM      Document was created using voice recognition software. Note was reviewed, however may contain grammatical errors.

## 2023-03-21 ENCOUNTER — NURSE ONLY (OUTPATIENT)
Dept: FAMILY MEDICINE CLINIC | Age: 63
End: 2023-03-21
Payer: COMMERCIAL

## 2023-03-21 DIAGNOSIS — E78.2 MIXED HYPERLIPIDEMIA: ICD-10-CM

## 2023-03-21 DIAGNOSIS — E78.2 MIXED HYPERLIPIDEMIA: Primary | ICD-10-CM

## 2023-03-21 LAB
ALBUMIN SERPL-MCNC: 4.1 G/DL (ref 3.5–5.2)
ALP SERPL-CCNC: 58 U/L (ref 40–129)
ALT SERPL-CCNC: 13 U/L (ref 0–40)
ANION GAP SERPL CALCULATED.3IONS-SCNC: 14 MMOL/L (ref 7–16)
AST SERPL-CCNC: 19 U/L (ref 0–39)
BILIRUB SERPL-MCNC: 0.4 MG/DL (ref 0–1.2)
BUN SERPL-MCNC: 10 MG/DL (ref 6–23)
CALCIUM SERPL-MCNC: 9.6 MG/DL (ref 8.6–10.2)
CHLORIDE SERPL-SCNC: 105 MMOL/L (ref 98–107)
CHOLESTEROL, TOTAL: 213 MG/DL (ref 0–199)
CO2 SERPL-SCNC: 23 MMOL/L (ref 22–29)
CREAT SERPL-MCNC: 1 MG/DL (ref 0.7–1.2)
GLUCOSE SERPL-MCNC: 101 MG/DL (ref 74–99)
HDLC SERPL-MCNC: 50 MG/DL
LDLC SERPL CALC-MCNC: 141 MG/DL (ref 0–99)
POTASSIUM SERPL-SCNC: 4.9 MMOL/L (ref 3.5–5)
PROT SERPL-MCNC: 7.2 G/DL (ref 6.4–8.3)
SODIUM SERPL-SCNC: 142 MMOL/L (ref 132–146)
TRIGL SERPL-MCNC: 108 MG/DL (ref 0–149)
VLDLC SERPL CALC-MCNC: 22 MG/DL

## 2023-03-21 PROCEDURE — 36415 COLL VENOUS BLD VENIPUNCTURE: CPT | Performed by: FAMILY MEDICINE

## 2023-03-22 ENCOUNTER — TELEPHONE (OUTPATIENT)
Dept: FAMILY MEDICINE CLINIC | Age: 63
End: 2023-03-22

## 2023-03-22 NOTE — TELEPHONE ENCOUNTER
The 10-year ASCVD risk score (Trev HATCH, et al., 2019) is: 21.6%    Values used to calculate the score:      Age: 58 years      Sex: Male      Is Non- : No      Diabetic: No      Tobacco smoker: Yes      Systolic Blood Pressure: 264 mmHg      Is BP treated: Yes      HDL Cholesterol: 50 mg/dL      Total Cholesterol: 213 mg/dL    Please call patient. He did a great job of decreasing his cholesterol with his diet! Please keep this up - really impressive! However, because he smokes, because he is male and because of his age, it would be safest to start the medicine that protects him from having a heart attack or stroke in the future bc his risk of this is about 22% over the next 10 years which is high. If he has side effects, he can stop the medicine and let me know. The medicine that I ordered is one that has the least risk of side effects. If he needs me to send it in to the pharmacy again, please let me know (I had sent in crestor but he called and did not want to start it bc of the side effect profile). It is important for his preventive health that he start this.   nika

## 2023-03-22 NOTE — TELEPHONE ENCOUNTER
Patient notified and verbalizes understanding. He does not wish to start medication at this time. He will keep watching his diet.

## 2023-04-25 ENCOUNTER — TELEPHONE (OUTPATIENT)
Dept: ENT CLINIC | Age: 63
End: 2023-04-25

## 2023-04-25 NOTE — TELEPHONE ENCOUNTER
MA spoke with patient's wife, scheduled 4/26 per Dr. Belinda Arnold    Electronically signed by Devon Kidd MA on 4/25/23 at 2:54 PM EDT

## 2023-04-25 NOTE — TELEPHONE ENCOUNTER
Wife Morgan Curtis called and wanted to have Burak Lee seen as soon as possible. She said that he is starting to lose his voice again.

## 2023-04-26 ENCOUNTER — OFFICE VISIT (OUTPATIENT)
Dept: ENT CLINIC | Age: 63
End: 2023-04-26
Payer: COMMERCIAL

## 2023-04-26 VITALS
SYSTOLIC BLOOD PRESSURE: 139 MMHG | TEMPERATURE: 98.2 F | BODY MASS INDEX: 25.18 KG/M2 | RESPIRATION RATE: 12 BRPM | OXYGEN SATURATION: 99 % | HEART RATE: 66 BPM | DIASTOLIC BLOOD PRESSURE: 91 MMHG | WEIGHT: 190 LBS | HEIGHT: 73 IN

## 2023-04-26 DIAGNOSIS — C32.0 SQUAMOUS CELL CARCINOMA OF LEFT VOCAL CORD (HCC): ICD-10-CM

## 2023-04-26 DIAGNOSIS — J30.1 SEASONAL ALLERGIC RHINITIS DUE TO POLLEN: ICD-10-CM

## 2023-04-26 DIAGNOSIS — C32.0 CANCER OF GLOTTIS (HCC): Primary | ICD-10-CM

## 2023-04-26 PROCEDURE — 3074F SYST BP LT 130 MM HG: CPT | Performed by: OTOLARYNGOLOGY

## 2023-04-26 PROCEDURE — 99213 OFFICE O/P EST LOW 20 MIN: CPT | Performed by: OTOLARYNGOLOGY

## 2023-04-26 PROCEDURE — 31575 DIAGNOSTIC LARYNGOSCOPY: CPT | Performed by: OTOLARYNGOLOGY

## 2023-04-26 PROCEDURE — 3078F DIAST BP <80 MM HG: CPT | Performed by: OTOLARYNGOLOGY

## 2023-04-26 ASSESSMENT — ENCOUNTER SYMPTOMS
COLOR CHANGE: 0
RESPIRATORY NEGATIVE: 1
SHORTNESS OF BREATH: 0
GASTROINTESTINAL NEGATIVE: 1
VOICE CHANGE: 1
ABDOMINAL PAIN: 0
EYES NEGATIVE: 1

## 2023-05-30 ENCOUNTER — OFFICE VISIT (OUTPATIENT)
Dept: ENT CLINIC | Age: 63
End: 2023-05-30
Payer: COMMERCIAL

## 2023-05-30 VITALS
TEMPERATURE: 98.4 F | OXYGEN SATURATION: 97 % | SYSTOLIC BLOOD PRESSURE: 135 MMHG | HEIGHT: 73 IN | DIASTOLIC BLOOD PRESSURE: 94 MMHG | WEIGHT: 184 LBS | HEART RATE: 73 BPM | BODY MASS INDEX: 24.39 KG/M2 | RESPIRATION RATE: 12 BRPM

## 2023-05-30 DIAGNOSIS — C32.0 CANCER OF GLOTTIS (HCC): Primary | ICD-10-CM

## 2023-05-30 PROCEDURE — 3074F SYST BP LT 130 MM HG: CPT | Performed by: OTOLARYNGOLOGY

## 2023-05-30 PROCEDURE — 3078F DIAST BP <80 MM HG: CPT | Performed by: OTOLARYNGOLOGY

## 2023-05-30 PROCEDURE — 99214 OFFICE O/P EST MOD 30 MIN: CPT | Performed by: OTOLARYNGOLOGY

## 2023-05-30 ASSESSMENT — ENCOUNTER SYMPTOMS
EYES NEGATIVE: 1
VOICE CHANGE: 1
RESPIRATORY NEGATIVE: 1
GASTROINTESTINAL NEGATIVE: 1
SHORTNESS OF BREATH: 0
ABDOMINAL PAIN: 0
COLOR CHANGE: 0

## 2023-06-23 ENCOUNTER — TELEPHONE (OUTPATIENT)
Dept: FAMILY MEDICINE CLINIC | Age: 63
End: 2023-06-23

## 2023-06-23 NOTE — TELEPHONE ENCOUNTER
Patient's wife called regarding pt noticing that while he was flossing as he usually does he noticed that multiple teeth on the bottom are black. There is no pain or bleeding at those sites. Can be overheard in the back stating that he does have some minimal bleeding with flossing. No trouble swallowing or eating. Advised patient to f/u with dentist. Recommend ED evaluation if persistent bleeding or excessive pain at gums. Express understanding.     Epifanio Arriaga MD   Resident PGY-2

## 2023-12-05 ENCOUNTER — OFFICE VISIT (OUTPATIENT)
Dept: ENT CLINIC | Age: 63
End: 2023-12-05
Payer: COMMERCIAL

## 2023-12-05 VITALS — BODY MASS INDEX: 23.86 KG/M2 | WEIGHT: 180 LBS | HEIGHT: 73 IN

## 2023-12-05 DIAGNOSIS — F17.200 TOBACCO DEPENDENCE: ICD-10-CM

## 2023-12-05 DIAGNOSIS — C32.0 SQUAMOUS CELL CARCINOMA OF LEFT VOCAL CORD (HCC): Primary | ICD-10-CM

## 2023-12-05 PROCEDURE — 99213 OFFICE O/P EST LOW 20 MIN: CPT | Performed by: OTOLARYNGOLOGY

## 2023-12-05 ASSESSMENT — ENCOUNTER SYMPTOMS
SORE THROAT: 0
VOICE CHANGE: 0
RESPIRATORY NEGATIVE: 1
GASTROINTESTINAL NEGATIVE: 1
EYES NEGATIVE: 1
ABDOMINAL PAIN: 0
TROUBLE SWALLOWING: 0
COLOR CHANGE: 0
SHORTNESS OF BREATH: 0

## 2023-12-05 NOTE — PROGRESS NOTES
Subjective:      Patient ID:  Brook Whitman is a 58 y.o. male. HPI:    Patient presents today for cancer surveillance. Denies any changes in voice, trouble swallowing, weight changes or neck mass. No complaints at this time. Continues to smoke and is not interested in quitting. History of SCC of left TVC s/p XRT in 2018. Past Medical History:   Diagnosis Date    Afib ([address])     Anxiety     Caffeine abuse ([address])     12 cups per day    Cardiomyopathy ([address])     [place], now back to normal    Chronic bilateral low back pain without sciatica 12/4/2019    Chronic bronchitis ([address])     Chronic rhinitis     exposed to dust    COPD (chronic obstructive pulmonary disease) ([address]) 5/20/2015    Mild per PFT's 2014 [facility]    COVID-19 11/23/2021    Diagnosis added to problem list by Keila Montoya based on transcribed order from Dr. Marcella Francisco. History of alcohol abuse     Hyperlipidemia     Hypertension     IGT (impaired glucose tolerance) 11/18/2015    Other primary cardiomyopathies 10/26/2011    Non-ischemic Cardiomyopathy Sees cardio - last visit: unknown. Miguel.       Right shoulder pain     impingement, Duffet    S/P ablation of atrial fibrillation 12/6/2011    ablation at [facility]    Spondylosis of cervical joint     Tobacco abuse      Past Surgical History:   Procedure Laterality Date    COLONOSCOPY  08, 09, 2012 [facility]    2 adenomatous polyps    ECHO COMPL W DOP COLOR FLOW  11/15/2011         HERNIA REPAIR       [address] INCL FLUOR GDNCE DX W/CELL WASHG SPX N/A 9/28/2018    PANDOSCOPY WITH BIOPSY LEFT TRUE VOCAL CORD WITH FROZEN SECTION ++PATHOLOGY NEEDED++ performed by Lalit Bob DO at [address] [facility] TRANSORAL DIAGNOSTIC N/A 9/28/2018    ESOPHAGOSCOPY RIGID performed by Lalit Bob DO at [facility] OR     Family History   Problem Relation Age of Onset    Heart Disease Father 61        smoker    Cancer Brother         colon     Social History     Socioeconomic History

## 2023-12-17 PROBLEM — J18.9 COMMUNITY ACQUIRED PNEUMONIA OF LEFT UPPER LOBE OF LUNG: Status: ACTIVE | Noted: 2023-12-17

## 2023-12-18 PROBLEM — C32.0 CARCINOMA OF VOCAL CORD (HCC): Status: RESOLVED | Noted: 2018-10-05 | Resolved: 2023-12-18

## 2023-12-18 PROBLEM — K92.0 HEMATEMESIS: Status: ACTIVE | Noted: 2023-12-18

## 2023-12-20 ENCOUNTER — OFFICE VISIT (OUTPATIENT)
Dept: FAMILY MEDICINE CLINIC | Age: 63
End: 2023-12-20

## 2023-12-20 VITALS
SYSTOLIC BLOOD PRESSURE: 129 MMHG | TEMPERATURE: 97.2 F | WEIGHT: 191 LBS | RESPIRATION RATE: 16 BRPM | DIASTOLIC BLOOD PRESSURE: 86 MMHG | HEART RATE: 66 BPM | OXYGEN SATURATION: 95 % | BODY MASS INDEX: 25.31 KG/M2 | HEIGHT: 73 IN

## 2023-12-20 DIAGNOSIS — J18.9 COMMUNITY ACQUIRED PNEUMONIA OF LEFT UPPER LOBE OF LUNG: Primary | ICD-10-CM

## 2023-12-20 DIAGNOSIS — R04.2 HEMOPTYSIS: ICD-10-CM

## 2023-12-20 NOTE — PROGRESS NOTES
Hospital follow up   Continues to smoke.  Recent pneumonia.  Doing well. Finishing antibiotics tomorrow.  Minimal hemoptysis now  Examination  Blood pressure 129/86, pulse 66, temperature 97.2 °F (36.2 °C), resp. rate 16, height 1.854 m (6' 0.99\"), weight 86.6 kg (191 lb), SpO2 95 %.   Stable exam  Lungs clear  A/P  Finish course of antibiotics  Attending Physician Statement  I have discussed the case, including pertinent history and exam findings with the resident. I agree with the documented assessment and plan.

## 2023-12-20 NOTE — PROGRESS NOTES
[facility]  Department of Family Medicine  Family Medicine Residency Program    Zachary Mcgraw (:  1960) is a 63 y.o. male,Established patient, here for evaluation of the following chief complaint(s):  Follow-Up from Hospital (TCM - throwing up blood - admitted to hospital /Pneumonia ) and Care Coordination      ASSESSMENT/PLAN:    ICD-10-CM    1. Community acquired pneumonia of left upper lobe of lung  J18.9       2. Hemoptysis  R04.2           No follow-ups on file.    SUBJECTIVE/OBJECTIVE:  [facility]    Hospital follow-up   Recent admission for hematemesis/hemoptysis and CAP  Mucus is still occasionally blood streaked but not every time he brings up mucus  Significantly decreased blood from cough  Still holding Aspirin as recommended by GI  Has one more day of antibiotics to complete    Needs follow-up with GI, Dr. Greenfield, and pulmonology, Dr. Cheek    Review of Systems   Constitutional:  Negative for chills and fever.   Respiratory:  Positive for cough. Negative for chest tightness and shortness of breath.    Cardiovascular:  Negative for chest pain and palpitations.   Gastrointestinal:  Negative for blood in stool, constipation, diarrhea, nausea and vomiting.   Genitourinary:  Negative for difficulty urinating, dysuria and hematuria.           Diagnosis Date    Afib (HCC)     Anxiety     Caffeine abuse (HCC)     12 cups per day    Cardiomyopathy (HCC)     Scavina, now back to normal    Chronic bilateral low back pain without sciatica 2019    Chronic bronchitis ([facility])     Chronic rhinitis     exposed to dust    COPD (chronic obstructive pulmonary disease) ([facility]) 2015    Mild per PFT's 2014 [facility]    COVID-19 2021    Diagnosis added to problem list by Kevin Musa based on transcribed order from Dr. Perdomo.     History of alcohol abuse     Hyperlipidemia     Hypertension     IGT (impaired glucose tolerance) 2015    Other primary cardiomyopathies 10/26/2011

## 2023-12-20 NOTE — PROGRESS NOTES
Post-Discharge Transitional Care  Follow Up      Zachary Mcgraw   YOB: 1960    Date of Office Visit:  12/20/2023  Date of Hospital Admission: 12/30/23  Date of Hospital Discharge: 1/1/24  Risk of hospital readmission (high >=14%. Medium >=10%) :Readmission Risk Score: 11.3      Care management risk score Rising risk (score 2-5) and Complex Care (Scores >=6): No Risk Score On File     Non face to face  following discharge, date last encounter closed (first attempt may have been earlier): *No documented post hospital discharge outreach found in the last 14 days    Call initiated 2 business days of discharge: *No response recorded in the last 14 days    ASSESSMENT/PLAN:   Community acquired pneumonia of left upper lobe of lung  Hemoptysis      Medical Decision Making: moderate complexity  Return if symptoms worsen or fail to improve.         1. Community acquired pneumonia of left upper lobe of lung  Improving. Finish last day of antibiotics    2. Hemoptysis  Improving. Follow-up with pulmonology for possible bronchoscopy        Subjective:   HPI:  Follow up of Hospital problems/diagnosis(es):     Recent admission for hematemesis/hemoptysis and CAP  Mucus he brings up is still occasionally blood streaked but significantly decreased blood from cough  Still holding Aspirin as recommended by GI  Has one more day of antibiotics to complete    Needs follow-up with GI, Dr. Greenfield, and pulmonology, Dr. Cheek    Inpatient course: Discharge summary reviewed- see chart.    Patient Active Problem List   Diagnosis    Benign essential hypertension    Atrial fibrillation (HCC)    S/P ablation of atrial fibrillation    Tobacco user    History of alcohol abuse    Caffeine abuse (HCC)    Spondylosis of cervical joint    Chronic rhinitis    Allergic rhinitis    Impaired glucose tolerance    Hemoptysis    Chronic bilateral low back pain without sciatica    Tubular adenoma of colon    Chronic bronchitis with productive

## 2023-12-30 ENCOUNTER — HOSPITAL ENCOUNTER (INPATIENT)
Age: 63
LOS: 1 days | Discharge: HOME | DRG: 204 | End: 2024-01-01
Attending: EMERGENCY MEDICINE | Admitting: FAMILY MEDICINE
Payer: COMMERCIAL

## 2023-12-30 ENCOUNTER — APPOINTMENT (OUTPATIENT)
Dept: GENERAL RADIOLOGY | Age: 63
DRG: 204 | End: 2023-12-30
Payer: COMMERCIAL

## 2023-12-30 DIAGNOSIS — R04.2 HEMOPTYSIS: Primary | ICD-10-CM

## 2023-12-30 PROCEDURE — 99285 EMERGENCY DEPT VISIT HI MDM: CPT

## 2023-12-30 PROCEDURE — 2500000003 HC RX 250 WO HCPCS: Performed by: EMERGENCY MEDICINE

## 2023-12-30 PROCEDURE — 96374 THER/PROPH/DIAG INJ IV PUSH: CPT

## 2023-12-30 RX ORDER — TRANEXAMIC ACID 100 MG/ML
1000 INJECTION, SOLUTION INTRAVENOUS ONCE
Status: COMPLETED | OUTPATIENT
Start: 2023-12-30 | End: 2023-12-30

## 2023-12-30 RX ADMIN — TRANEXAMIC ACID 1000 MG: 100 INJECTION, SOLUTION INTRAVENOUS at 23:50

## 2023-12-30 ASSESSMENT — LIFESTYLE VARIABLES
HOW MANY STANDARD DRINKS CONTAINING ALCOHOL DO YOU HAVE ON A TYPICAL DAY: 1 OR 2
HOW OFTEN DO YOU HAVE A DRINK CONTAINING ALCOHOL: MONTHLY OR LESS

## 2023-12-31 ENCOUNTER — APPOINTMENT (OUTPATIENT)
Dept: GENERAL RADIOLOGY | Age: 63
DRG: 204 | End: 2023-12-31
Payer: COMMERCIAL

## 2023-12-31 ENCOUNTER — APPOINTMENT (OUTPATIENT)
Dept: CT IMAGING | Age: 63
DRG: 204 | End: 2023-12-31
Payer: COMMERCIAL

## 2023-12-31 PROBLEM — J18.1 LEFT UPPER LOBE CONSOLIDATION (HCC): Status: ACTIVE | Noted: 2023-12-31

## 2023-12-31 PROBLEM — J34.89 SINUS PRESSURE: Status: ACTIVE | Noted: 2023-12-31

## 2023-12-31 LAB
ALBUMIN SERPL-MCNC: 4.2 G/DL (ref 3.5–5.2)
ALP SERPL-CCNC: 49 U/L (ref 40–129)
ALT SERPL-CCNC: 12 U/L (ref 0–40)
ANION GAP SERPL CALCULATED.3IONS-SCNC: 12 MMOL/L (ref 7–16)
ANION GAP SERPL CALCULATED.3IONS-SCNC: 13 MMOL/L (ref 7–16)
AST SERPL-CCNC: 15 U/L (ref 0–39)
B PARAP IS1001 DNA NPH QL NAA+NON-PROBE: NOT DETECTED
B PERT DNA SPEC QL NAA+PROBE: NOT DETECTED
BASOPHILS # BLD: 0.02 K/UL (ref 0–0.2)
BASOPHILS NFR BLD: 0 % (ref 0–2)
BILIRUB SERPL-MCNC: 0.2 MG/DL (ref 0–1.2)
BUN SERPL-MCNC: 18 MG/DL (ref 6–23)
BUN SERPL-MCNC: 19 MG/DL (ref 6–23)
C PNEUM DNA NPH QL NAA+NON-PROBE: NOT DETECTED
CALCIUM SERPL-MCNC: 9.2 MG/DL (ref 8.6–10.2)
CALCIUM SERPL-MCNC: 9.2 MG/DL (ref 8.6–10.2)
CHLORIDE SERPL-SCNC: 101 MMOL/L (ref 98–107)
CHLORIDE SERPL-SCNC: 101 MMOL/L (ref 98–107)
CO2 SERPL-SCNC: 23 MMOL/L (ref 22–29)
CO2 SERPL-SCNC: 24 MMOL/L (ref 22–29)
CREAT SERPL-MCNC: 0.8 MG/DL (ref 0.7–1.2)
CREAT SERPL-MCNC: 0.9 MG/DL (ref 0.7–1.2)
CRP SERPL HS-MCNC: <3 MG/L (ref 0–5)
EOSINOPHIL # BLD: 0.18 K/UL (ref 0.05–0.5)
EOSINOPHILS RELATIVE PERCENT: 2 % (ref 0–6)
ERYTHROCYTE [DISTWIDTH] IN BLOOD BY AUTOMATED COUNT: 12.9 % (ref 11.5–15)
ERYTHROCYTE [SEDIMENTATION RATE] IN BLOOD BY WESTERGREN METHOD: 14 MM/HR (ref 0–15)
FLUAV RNA NPH QL NAA+NON-PROBE: NOT DETECTED
FLUBV RNA NPH QL NAA+NON-PROBE: NOT DETECTED
GFR SERPL CREATININE-BSD FRML MDRD: >60 ML/MIN/1.73M2
GFR SERPL CREATININE-BSD FRML MDRD: >60 ML/MIN/1.73M2
GLUCOSE SERPL-MCNC: 130 MG/DL (ref 74–99)
GLUCOSE SERPL-MCNC: 98 MG/DL (ref 74–99)
HADV DNA NPH QL NAA+NON-PROBE: NOT DETECTED
HCOV 229E RNA NPH QL NAA+NON-PROBE: NOT DETECTED
HCOV HKU1 RNA NPH QL NAA+NON-PROBE: NOT DETECTED
HCOV NL63 RNA NPH QL NAA+NON-PROBE: NOT DETECTED
HCOV OC43 RNA NPH QL NAA+NON-PROBE: NOT DETECTED
HCT VFR BLD AUTO: 37.9 % (ref 37–54)
HGB BLD-MCNC: 13.2 G/DL (ref 12.5–16.5)
HMPV RNA NPH QL NAA+NON-PROBE: NOT DETECTED
HPIV1 RNA NPH QL NAA+NON-PROBE: NOT DETECTED
HPIV2 RNA NPH QL NAA+NON-PROBE: NOT DETECTED
HPIV3 RNA NPH QL NAA+NON-PROBE: NOT DETECTED
HPIV4 RNA NPH QL NAA+NON-PROBE: NOT DETECTED
IMM GRANULOCYTES # BLD AUTO: 0.03 K/UL (ref 0–0.58)
IMM GRANULOCYTES NFR BLD: 0 % (ref 0–5)
INR PPP: 1
LACTATE BLDV-SCNC: 1 MMOL/L (ref 0.5–2.2)
LIPASE SERPL-CCNC: 45 U/L (ref 13–60)
LYMPHOCYTES NFR BLD: 1.77 K/UL (ref 1.5–4)
LYMPHOCYTES RELATIVE PERCENT: 20 % (ref 20–42)
M PNEUMO DNA NPH QL NAA+NON-PROBE: NOT DETECTED
MAGNESIUM SERPL-MCNC: 1.9 MG/DL (ref 1.6–2.6)
MCH RBC QN AUTO: 32.7 PG (ref 26–35)
MCHC RBC AUTO-ENTMCNC: 34.8 G/DL (ref 32–34.5)
MCV RBC AUTO: 93.8 FL (ref 80–99.9)
MONOCYTES NFR BLD: 0.56 K/UL (ref 0.1–0.95)
MONOCYTES NFR BLD: 6 % (ref 2–12)
NEUTROPHILS NFR BLD: 71 % (ref 43–80)
NEUTS SEG NFR BLD: 6.18 K/UL (ref 1.8–7.3)
PLATELET # BLD AUTO: 308 K/UL (ref 130–450)
PMV BLD AUTO: 8.5 FL (ref 7–12)
POTASSIUM SERPL-SCNC: 3.7 MMOL/L (ref 3.5–5)
POTASSIUM SERPL-SCNC: 4 MMOL/L (ref 3.5–5)
PROT SERPL-MCNC: 7 G/DL (ref 6.4–8.3)
PROTHROMBIN TIME: 11.4 SEC (ref 9.3–12.4)
RBC # BLD AUTO: 4.04 M/UL (ref 3.8–5.8)
RSV RNA NPH QL NAA+NON-PROBE: NOT DETECTED
RV+EV RNA NPH QL NAA+NON-PROBE: NOT DETECTED
SARS-COV-2 RDRP RESP QL NAA+PROBE: NOT DETECTED
SARS-COV-2 RNA NPH QL NAA+NON-PROBE: NOT DETECTED
SODIUM SERPL-SCNC: 137 MMOL/L (ref 132–146)
SODIUM SERPL-SCNC: 137 MMOL/L (ref 132–146)
SPECIMEN DESCRIPTION: NORMAL
SPECIMEN DESCRIPTION: NORMAL
TROPONIN I SERPL HS-MCNC: <6 NG/L (ref 0–11)
TSH SERPL DL<=0.05 MIU/L-ACNC: 1.84 UIU/ML (ref 0.27–4.2)
WBC OTHER # BLD: 8.7 K/UL (ref 4.5–11.5)

## 2023-12-31 PROCEDURE — 86038 ANTINUCLEAR ANTIBODIES: CPT

## 2023-12-31 PROCEDURE — 6370000000 HC RX 637 (ALT 250 FOR IP): Performed by: STUDENT IN AN ORGANIZED HEALTH CARE EDUCATION/TRAINING PROGRAM

## 2023-12-31 PROCEDURE — 0202U NFCT DS 22 TRGT SARS-COV-2: CPT

## 2023-12-31 PROCEDURE — 70487 CT MAXILLOFACIAL W/DYE: CPT

## 2023-12-31 PROCEDURE — 6360000002 HC RX W HCPCS: Performed by: EMERGENCY MEDICINE

## 2023-12-31 PROCEDURE — 84484 ASSAY OF TROPONIN QUANT: CPT

## 2023-12-31 PROCEDURE — 94640 AIRWAY INHALATION TREATMENT: CPT

## 2023-12-31 PROCEDURE — 71045 X-RAY EXAM CHEST 1 VIEW: CPT

## 2023-12-31 PROCEDURE — 83690 ASSAY OF LIPASE: CPT

## 2023-12-31 PROCEDURE — 6370000000 HC RX 637 (ALT 250 FOR IP)

## 2023-12-31 PROCEDURE — 6360000004 HC RX CONTRAST MEDICATION: Performed by: RADIOLOGY

## 2023-12-31 PROCEDURE — 85610 PROTHROMBIN TIME: CPT

## 2023-12-31 PROCEDURE — 83605 ASSAY OF LACTIC ACID: CPT

## 2023-12-31 PROCEDURE — 83735 ASSAY OF MAGNESIUM: CPT

## 2023-12-31 PROCEDURE — 36415 COLL VENOUS BLD VENIPUNCTURE: CPT

## 2023-12-31 PROCEDURE — 85652 RBC SED RATE AUTOMATED: CPT

## 2023-12-31 PROCEDURE — 80048 BASIC METABOLIC PNL TOTAL CA: CPT

## 2023-12-31 PROCEDURE — 86140 C-REACTIVE PROTEIN: CPT

## 2023-12-31 PROCEDURE — 85025 COMPLETE CBC W/AUTO DIFF WBC: CPT

## 2023-12-31 PROCEDURE — 86039 ANTINUCLEAR ANTIBODIES (ANA): CPT

## 2023-12-31 PROCEDURE — 87040 BLOOD CULTURE FOR BACTERIA: CPT

## 2023-12-31 PROCEDURE — 1200000000 HC SEMI PRIVATE

## 2023-12-31 PROCEDURE — 2580000003 HC RX 258

## 2023-12-31 PROCEDURE — 6360000002 HC RX W HCPCS

## 2023-12-31 PROCEDURE — 84443 ASSAY THYROID STIM HORMONE: CPT

## 2023-12-31 PROCEDURE — 87635 SARS-COV-2 COVID-19 AMP PRB: CPT

## 2023-12-31 PROCEDURE — 83516 IMMUNOASSAY NONANTIBODY: CPT

## 2023-12-31 PROCEDURE — 99222 1ST HOSP IP/OBS MODERATE 55: CPT | Performed by: FAMILY MEDICINE

## 2023-12-31 PROCEDURE — 80053 COMPREHEN METABOLIC PANEL: CPT

## 2023-12-31 PROCEDURE — 93005 ELECTROCARDIOGRAM TRACING: CPT | Performed by: EMERGENCY MEDICINE

## 2023-12-31 RX ORDER — SODIUM CHLORIDE 9 MG/ML
INJECTION, SOLUTION INTRAVENOUS PRN
Status: DISCONTINUED | OUTPATIENT
Start: 2023-12-31 | End: 2024-01-01 | Stop reason: HOSPADM

## 2023-12-31 RX ORDER — ACETAMINOPHEN 325 MG/1
650 TABLET ORAL EVERY 6 HOURS PRN
Status: DISCONTINUED | OUTPATIENT
Start: 2023-12-31 | End: 2024-01-01 | Stop reason: HOSPADM

## 2023-12-31 RX ORDER — DIPHENHYDRAMINE HCL 25 MG
50 TABLET ORAL NIGHTLY PRN
Status: DISCONTINUED | OUTPATIENT
Start: 2023-12-31 | End: 2024-01-01 | Stop reason: HOSPADM

## 2023-12-31 RX ORDER — ACETAMINOPHEN 650 MG/1
650 SUPPOSITORY RECTAL EVERY 6 HOURS PRN
Status: DISCONTINUED | OUTPATIENT
Start: 2023-12-31 | End: 2024-01-01 | Stop reason: HOSPADM

## 2023-12-31 RX ORDER — SODIUM CHLORIDE 0.9 % (FLUSH) 0.9 %
5-40 SYRINGE (ML) INJECTION PRN
Status: DISCONTINUED | OUTPATIENT
Start: 2023-12-31 | End: 2024-01-01 | Stop reason: HOSPADM

## 2023-12-31 RX ORDER — PANTOPRAZOLE SODIUM 40 MG/1
40 TABLET, DELAYED RELEASE ORAL
Status: DISCONTINUED | OUTPATIENT
Start: 2023-12-31 | End: 2024-01-01 | Stop reason: HOSPADM

## 2023-12-31 RX ORDER — FLUTICASONE PROPIONATE 50 MCG
2 SPRAY, SUSPENSION (ML) NASAL DAILY
Status: DISCONTINUED | OUTPATIENT
Start: 2023-12-31 | End: 2024-01-01 | Stop reason: HOSPADM

## 2023-12-31 RX ORDER — ONDANSETRON 4 MG/1
4 TABLET, ORALLY DISINTEGRATING ORAL EVERY 8 HOURS PRN
Status: DISCONTINUED | OUTPATIENT
Start: 2023-12-31 | End: 2024-01-01 | Stop reason: HOSPADM

## 2023-12-31 RX ORDER — BENZONATATE 100 MG/1
100 CAPSULE ORAL 3 TIMES DAILY PRN
Status: DISCONTINUED | OUTPATIENT
Start: 2023-12-31 | End: 2024-01-01 | Stop reason: HOSPADM

## 2023-12-31 RX ORDER — CARVEDILOL 6.25 MG/1
12.5 TABLET ORAL 2 TIMES DAILY
Status: DISCONTINUED | OUTPATIENT
Start: 2023-12-31 | End: 2024-01-01 | Stop reason: HOSPADM

## 2023-12-31 RX ORDER — IPRATROPIUM BROMIDE AND ALBUTEROL SULFATE 2.5; .5 MG/3ML; MG/3ML
1 SOLUTION RESPIRATORY (INHALATION)
Status: DISCONTINUED | OUTPATIENT
Start: 2023-12-31 | End: 2024-01-01 | Stop reason: HOSPADM

## 2023-12-31 RX ORDER — DEXAMETHASONE SODIUM PHOSPHATE 10 MG/ML
10 INJECTION INTRAMUSCULAR; INTRAVENOUS ONCE
Status: COMPLETED | OUTPATIENT
Start: 2023-12-31 | End: 2023-12-31

## 2023-12-31 RX ORDER — POLYETHYLENE GLYCOL 3350 17 G/17G
17 POWDER, FOR SOLUTION ORAL DAILY PRN
Status: DISCONTINUED | OUTPATIENT
Start: 2023-12-31 | End: 2024-01-01 | Stop reason: HOSPADM

## 2023-12-31 RX ORDER — SODIUM CHLORIDE 0.9 % (FLUSH) 0.9 %
5-40 SYRINGE (ML) INJECTION EVERY 12 HOURS SCHEDULED
Status: DISCONTINUED | OUTPATIENT
Start: 2023-12-31 | End: 2024-01-01 | Stop reason: HOSPADM

## 2023-12-31 RX ORDER — ONDANSETRON 2 MG/ML
4 INJECTION INTRAMUSCULAR; INTRAVENOUS EVERY 6 HOURS PRN
Status: DISCONTINUED | OUTPATIENT
Start: 2023-12-31 | End: 2024-01-01 | Stop reason: HOSPADM

## 2023-12-31 RX ADMIN — CARVEDILOL 12.5 MG: 6.25 TABLET, FILM COATED ORAL at 20:45

## 2023-12-31 RX ADMIN — DEXAMETHASONE SODIUM PHOSPHATE 10 MG: 10 INJECTION INTRAMUSCULAR; INTRAVENOUS at 01:43

## 2023-12-31 RX ADMIN — ONDANSETRON 4 MG: 2 INJECTION INTRAMUSCULAR; INTRAVENOUS at 21:35

## 2023-12-31 RX ADMIN — DIPHENHYDRAMINE HCL 50 MG: 25 TABLET ORAL at 22:45

## 2023-12-31 RX ADMIN — PANTOPRAZOLE SODIUM 40 MG: 40 TABLET, DELAYED RELEASE ORAL at 05:37

## 2023-12-31 RX ADMIN — IOPAMIDOL 50 ML: 755 INJECTION, SOLUTION INTRAVENOUS at 09:40

## 2023-12-31 RX ADMIN — SODIUM CHLORIDE, PRESERVATIVE FREE 10 ML: 5 INJECTION INTRAVENOUS at 20:45

## 2023-12-31 RX ADMIN — IPRATROPIUM BROMIDE AND ALBUTEROL SULFATE 1 DOSE: .5; 3 SOLUTION RESPIRATORY (INHALATION) at 13:29

## 2023-12-31 RX ADMIN — CARVEDILOL 12.5 MG: 6.25 TABLET, FILM COATED ORAL at 09:55

## 2023-12-31 RX ADMIN — SODIUM CHLORIDE, PRESERVATIVE FREE 10 ML: 5 INJECTION INTRAVENOUS at 09:56

## 2023-12-31 RX ADMIN — BENZONATATE 100 MG: 100 CAPSULE ORAL at 22:45

## 2023-12-31 ASSESSMENT — PAIN - FUNCTIONAL ASSESSMENT: PAIN_FUNCTIONAL_ASSESSMENT: NONE - DENIES PAIN

## 2023-12-31 NOTE — ED TRIAGE NOTES
Pt has been seen in 4 different hospitals for coughing up blood. Family wants transferred to [facility].

## 2023-12-31 NOTE — ED NOTES
ED to Inpatient Handoff Report    NotifiedTracy that electronic handoff available and patient ready for transport to room 0502.    Safety Risks: None identified    Patient in Restraints: no    Constant Observer or Patient : no    Telemetry Monitoring Ordered :No           Order to transfer to unit without monitor:N/A    Last MEWS: 1 Time completed: 0242    Deterioration Index Score:   Predictive Model Details          24 (Normal)  Factor Value    Calculated 12/31/2023 03:20 48% Age 63 years old    Deterioration Index Model 41% Respiratory rate 23     4% Systolic 127     2% Sodium 137 mmol/L     2% Pulse oximetry 94 %     2% Potassium 3.7 mmol/L     1% WBC count 8.7 k/uL     0% Hematocrit 37.9 %     0% Temperature 98 °F (36.7 °C)     0% Pulse 74        Vitals:    12/31/23 0031 12/31/23 0141 12/31/23 0225 12/31/23 0242   BP: (!) 154/101 124/89  127/84   Pulse: 68 67 74 74   Resp: 18 19 20 23   Temp:       TempSrc:       SpO2: 96% 94% 95% 94%   Weight:       Height:             Opportunity for questions and clarification was provided.

## 2023-12-31 NOTE — H&P
[facility]  Resident History and Physical      CHIEF COMPLAINT:    Chief Complaint   Patient presents with    Hemoptysis     Spitting up blood for 1 week. Feels sinus drainage before blood.        History of Present Illness:   Zachary Mcgraw  is a 63 y.o. 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  Patient states that he is continue to have hemoptysis since his admission December 16th for the same complaint where he was found to have community-acquired pneumonia.  He has since followed with his pulmonologist and established with [facility] with plan for bronchoscopy January 3.  He says that amount of hemoptysis has not appreciably changed but he is concerned that he is still coughing up blood.  He states that sometimes he coughs up mucus with blood streaks and other times there is significantly more blood with passage of clots.  He denies any nosebleeds or bleeding elsewhere.  He does state that he can \"feel \"when mucus is going to have more concentration of blood because he can feel it draining from his sinuses prior to coughing up blood.  He denies any other acute complaints or concerns.    ED course: Vitals were unremarkable.. Labs were unremarkable.. CXR was remarkable for  infiltrate of the left upper lobe of the lung . EKG was remarkable for normal sinus rhythm.    Family Medicine was consulted to admit the patient for hemoptysis    ROS:     Review of Systems   Constitutional:  Negative for chills and fever.   HENT:  Negative for nosebleeds and trouble swallowing.    Respiratory:  Positive for cough. Negative for chest tightness and shortness of breath.    Cardiovascular:  Negative for chest pain and palpitations.   Gastrointestinal:  Negative for constipation, diarrhea, nausea and vomiting.

## 2023-12-31 NOTE — ED PROVIDER NOTES
[facility] INCL FLUOR GDNCE DX W/CELL WASHG SPX N/A 2018    PANDOSCOPY WITH BIOPSY LEFT TRUE VOCAL CORD WITH FROZEN SECTION ++PATHOLOGY NEEDED++ performed by Swapnil Boudreaux DO at [facility] OR    TX ESOPHAGOSCOPY FLEXIBLE TRANSORAL DIAGNOSTIC N/A 2018    ESOPHAGOSCOPY RIGID performed by Swapnil Boudreaux DO at [facility] OR    UPPER GASTROINTESTINAL ENDOSCOPY N/A 2023    EGD BIOPSY performed by Nalini Greenfield MD at [facility] ENDOSCOPY       CURRENTMEDICATIONS       Current Discharge Medication List        CONTINUE these medications which have NOT CHANGED    Details   sodium chloride (OCEAN, BABY AYR) 0.65 % nasal spray 1 spray by Nasal route in the morning and 1 spray in the evening.  Qty: 12 mL, Refills: 0      pantoprazole (PROTONIX) 40 MG tablet Take 1 tablet by mouth every morning (before breakfast)  Qty: 30 tablet, Refills: 0      carvedilol (COREG) 12.5 MG tablet Take 1 tablet by mouth 2 times daily  Qty: 180 tablet, Refills: 3    Associated Diagnoses: Atrial fibrillation, unspecified type (HCC); Benign essential hypertension      aspirin 81 MG tablet Take 1 tablet by mouth daily             ALLERGIES     Patient has no known allergies.    FAMILYHISTORY       Family History   Problem Relation Age of Onset    Heart Disease Father 59        smoker    Cancer Brother         colon        SOCIAL HISTORY       Social History     Tobacco Use    Smoking status: Every Day     Current packs/day: 0.00     Average packs/day: 0.5 packs/day for 30.0 years (15.0 ttl pk-yrs)     Types: Cigars, Cigarettes     Start date: 10/15/1988     Last attempt to quit: 10/15/2018     Years since quittin.2    Smokeless tobacco: Never    Tobacco comments:     Quit smoking cigars 2018, started again in    Vaping Use    Vaping Use: Never used   Substance Use Topics    Alcohol use: No    Drug use: No       SCREENINGS        [place] Coma Scale  Eye Opening: Spontaneous  Best Verbal Response: Oriented  Best

## 2024-01-01 ENCOUNTER — APPOINTMENT (OUTPATIENT)
Dept: GENERAL RADIOLOGY | Age: 64
DRG: 204 | End: 2024-01-01
Payer: COMMERCIAL

## 2024-01-01 VITALS
DIASTOLIC BLOOD PRESSURE: 76 MMHG | SYSTOLIC BLOOD PRESSURE: 124 MMHG | HEART RATE: 67 BPM | HEIGHT: 73 IN | OXYGEN SATURATION: 94 % | RESPIRATION RATE: 17 BRPM | BODY MASS INDEX: 24.69 KG/M2 | WEIGHT: 186.29 LBS | TEMPERATURE: 97.9 F

## 2024-01-01 PROBLEM — J34.89 SINUS PRESSURE: Status: RESOLVED | Noted: 2023-12-31 | Resolved: 2024-01-01

## 2024-01-01 LAB
ANION GAP SERPL CALCULATED.3IONS-SCNC: 9 MMOL/L (ref 7–16)
BASOPHILS # BLD: 0.03 K/UL (ref 0–0.2)
BASOPHILS NFR BLD: 0 % (ref 0–2)
BUN SERPL-MCNC: 17 MG/DL (ref 6–23)
CALCIUM SERPL-MCNC: 9 MG/DL (ref 8.6–10.2)
CHLORIDE SERPL-SCNC: 103 MMOL/L (ref 98–107)
CO2 SERPL-SCNC: 25 MMOL/L (ref 22–29)
CREAT SERPL-MCNC: 0.8 MG/DL (ref 0.7–1.2)
EOSINOPHIL # BLD: 0.05 K/UL (ref 0.05–0.5)
EOSINOPHILS RELATIVE PERCENT: 0 % (ref 0–6)
ERYTHROCYTE [DISTWIDTH] IN BLOOD BY AUTOMATED COUNT: 13.1 % (ref 11.5–15)
GFR SERPL CREATININE-BSD FRML MDRD: >60 ML/MIN/1.73M2
GLUCOSE SERPL-MCNC: 112 MG/DL (ref 74–99)
HCT VFR BLD AUTO: 36.9 % (ref 37–54)
HGB BLD-MCNC: 12.4 G/DL (ref 12.5–16.5)
IMM GRANULOCYTES # BLD AUTO: 0.04 K/UL (ref 0–0.58)
IMM GRANULOCYTES NFR BLD: 0 % (ref 0–5)
LYMPHOCYTES NFR BLD: 2.92 K/UL (ref 1.5–4)
LYMPHOCYTES RELATIVE PERCENT: 25 % (ref 20–42)
MCH RBC QN AUTO: 31.5 PG (ref 26–35)
MCHC RBC AUTO-ENTMCNC: 33.6 G/DL (ref 32–34.5)
MCV RBC AUTO: 93.7 FL (ref 80–99.9)
MONOCYTES NFR BLD: 0.83 K/UL (ref 0.1–0.95)
MONOCYTES NFR BLD: 7 % (ref 2–12)
NEUTROPHILS NFR BLD: 67 % (ref 43–80)
NEUTS SEG NFR BLD: 8.01 K/UL (ref 1.8–7.3)
PLATELET # BLD AUTO: 321 K/UL (ref 130–450)
PMV BLD AUTO: 8.9 FL (ref 7–12)
POTASSIUM SERPL-SCNC: 3.7 MMOL/L (ref 3.5–5)
RBC # BLD AUTO: 3.94 M/UL (ref 3.8–5.8)
SODIUM SERPL-SCNC: 137 MMOL/L (ref 132–146)
WBC OTHER # BLD: 11.9 K/UL (ref 4.5–11.5)

## 2024-01-01 PROCEDURE — 2580000003 HC RX 258

## 2024-01-01 PROCEDURE — 85025 COMPLETE CBC W/AUTO DIFF WBC: CPT

## 2024-01-01 PROCEDURE — 99238 HOSP IP/OBS DSCHRG MGMT 30/<: CPT | Performed by: FAMILY MEDICINE

## 2024-01-01 PROCEDURE — 80048 BASIC METABOLIC PNL TOTAL CA: CPT

## 2024-01-01 PROCEDURE — 0CJY8ZZ INSPECTION OF MOUTH AND THROAT, VIA NATURAL OR ARTIFICIAL OPENING ENDOSCOPIC: ICD-10-PCS | Performed by: FAMILY MEDICINE

## 2024-01-01 PROCEDURE — 6370000000 HC RX 637 (ALT 250 FOR IP)

## 2024-01-01 PROCEDURE — 71045 X-RAY EXAM CHEST 1 VIEW: CPT

## 2024-01-01 PROCEDURE — 6360000002 HC RX W HCPCS

## 2024-01-01 PROCEDURE — 6370000000 HC RX 637 (ALT 250 FOR IP): Performed by: STUDENT IN AN ORGANIZED HEALTH CARE EDUCATION/TRAINING PROGRAM

## 2024-01-01 RX ORDER — IPRATROPIUM BROMIDE AND ALBUTEROL SULFATE 2.5; .5 MG/3ML; MG/3ML
3 SOLUTION RESPIRATORY (INHALATION)
Qty: 360 ML | Refills: 0
Start: 2024-01-02

## 2024-01-01 RX ORDER — FLUTICASONE PROPIONATE 50 MCG
2 SPRAY, SUSPENSION (ML) NASAL DAILY
Qty: 16 G | Refills: 3
Start: 2024-01-02

## 2024-01-01 RX ORDER — BENZONATATE 100 MG/1
100 CAPSULE ORAL 3 TIMES DAILY PRN
Qty: 30 CAPSULE | Refills: 0
Start: 2024-01-01 | End: 2024-01-11

## 2024-01-01 RX ADMIN — SODIUM CHLORIDE, PRESERVATIVE FREE 10 ML: 5 INJECTION INTRAVENOUS at 08:51

## 2024-01-01 RX ADMIN — PANTOPRAZOLE SODIUM 40 MG: 40 TABLET, DELAYED RELEASE ORAL at 05:26

## 2024-01-01 RX ADMIN — CARVEDILOL 12.5 MG: 6.25 TABLET, FILM COATED ORAL at 08:50

## 2024-01-01 RX ADMIN — ONDANSETRON 4 MG: 2 INJECTION INTRAMUSCULAR; INTRAVENOUS at 05:25

## 2024-01-01 RX ADMIN — BENZONATATE 100 MG: 100 CAPSULE ORAL at 05:26

## 2024-01-01 ASSESSMENT — ENCOUNTER SYMPTOMS
CHOKING: 0
VOICE CHANGE: 0
COUGH: 0
WHEEZING: 0
STRIDOR: 0
SORE THROAT: 0
SINUS PAIN: 0
COLOR CHANGE: 0
SINUS PRESSURE: 0
GASTROINTESTINAL NEGATIVE: 1
RHINORRHEA: 0
TROUBLE SWALLOWING: 0

## 2024-01-01 NOTE — CONSULTS
OTOLARYNGOLOGY  CONSULT NOTE  1/1/2024    Physician Consulted: Dr. Leroy  Reason for Consult: hemoptysis  Referring Physician: Dr. Cary ALFARO  Zachary Mcgraw is a 63 y.o. male who ENT was consulted for evaluation of hemoptysis. Pt established with Dr. Boudreaux with history of glottic SCC s/p XRT in 2018. Continues to smoked. Seen 12/17 by ENT where scope was negative. At that hospital stay EGD was also negative. Denies h/o of bleeding from nose. States he just cough up blood. Denies shortness of breath, voice changes, difficulty swallowing, fevers or chills. CXR shows unchanged findings from previous JULITO infiltrate.    Review of Systems   Constitutional:  Negative for activity change, fatigue and fever.   HENT:  Negative for congestion, ear discharge, ear pain, hearing loss, nosebleeds, postnasal drip, rhinorrhea, sinus pressure, sinus pain, sore throat, tinnitus, trouble swallowing and voice change.    Respiratory:  Negative for cough, choking, wheezing and stridor.         Hemoptysis   Cardiovascular:  Negative for chest pain.   Gastrointestinal: Negative.    Genitourinary: Negative.    Skin:  Negative for color change and rash.   Neurological:  Negative for speech difficulty, light-headedness, numbness and headaches.   Hematological:  Negative for adenopathy.   Psychiatric/Behavioral:  Negative for behavioral problems.        Past Medical History:   Diagnosis Date    Afib (HCC)     Anxiety     Caffeine abuse ([facility])     12 cups per day    Cardiomyopathy (HCC)     Scavina, now back to normal    Chronic bilateral low back pain without sciatica 12/4/2019    Chronic bronchitis ([facility])     Chronic rhinitis     exposed to dust    COPD (chronic obstructive pulmonary disease) ([facility]) 5/20/2015    Mild per PFT's 2014 [facility]    COVID-19 11/23/2021    Diagnosis added to problem list by Kevin Musa based on transcribed order from Dr. Perdomo.     History of alcohol abuse     Hyperlipidemia     Hypertension     IGT 
adenomatous polyps    ECHO COMPL W DOP COLOR FLOW  11/15/2011         HERNIA REPAIR      OR [facility] INCL FLUOR GDNCE DX W/CELL WASHG SPX N/A 9/28/2018    PANDOSCOPY WITH BIOPSY LEFT TRUE VOCAL CORD WITH FROZEN SECTION ++PATHOLOGY NEEDED++ performed by Swapnil Boudreaux DO at [facility] OR    OR ESOPHAGOSCOPY FLEXIBLE TRANSORAL DIAGNOSTIC N/A 9/28/2018    ESOPHAGOSCOPY RIGID performed by Swapnil Boudreaux DO at [facility] OR    UPPER GASTROINTESTINAL ENDOSCOPY N/A 12/18/2023    EGD BIOPSY performed by Nalini Greenfield MD at [facility] ENDOSCOPY       Review of Systems:   Constitutional: As noted in the HPI.   Eyes: No visual changes or diplopia. No scleral icterus.  ENT: No headaches, hearing loss or vertigo. (+) nasal congestion, or sore throat.  Cardiovascular: No chest pain, dyspnea on exertion, or palpitations.  Respiratory: See above  Gastrointestinal: No abdominal pain, nausea or emesis. No diarrhea or rectal bleeding or melena. No change in bowel habits.   Genitourinary: No dysuria, urinary frequency, or incontinence. No hematuria.  Musculoskeletal: No gait disturbance, weakness or joint complaints.  Integumentary: No rash or pruritis. No abnormal pigmentation, hair or nail changes.  Neurological: No headache, diplopia, dizziness, tremor, change in muscle strength, numbness or tingling. No change in gait, balance, coordination, mood, affect, memory, mentation, behavior.  Psychiatric: No anxiety or depression.  Endocrine: No temperature intolerance, excessive thirst, fluid intake, urinary frequency, excessive appetite, or recent weight change.   Hematologic/Lymphatic: No abnormal bruising or bleeding, blood clots or swollen lymph nodes. No anemia, fever, chills, night sweats, or swollen glands.  Allergic/Immunologic: No seasonal or perenial allergies. No history of hives or atopic dermatitis.    Social History:  Alcohol:  denies  Tobacco:   quit recently  Employment:  no silica or asbestos exposure  Family:  No

## 2024-01-01 NOTE — PROGRESS NOTES
[facility]  Progress Note    Chief complaint :  Chief Complaint   Patient presents with    Hemoptysis     Spitting up blood for 1 week. Feels sinus drainage before blood.       Subjective:    No acute events overnight.  Patient denies any issues since coming up from the ER.  He has not had any further episodes of coughing or hemoptysis however he said he normally does not cough at night.  Otherwise no concerns or complaints.  Denies headache, fevers, chills, nausea, vomiting, chest pain, shortness of breath.    Past medical, surgical, family and social history were reviewed, non-contributory, and unchanged unless otherwise stated.    Review of systems: Negative except for stated above.      Objective:  /75   Pulse 75   Temp 98.1 °F (36.7 °C) (Oral)   Resp 18   Ht 1.854 m (6' 1\")   Wt 86.2 kg (190 lb)   SpO2 93%   BMI 25.07 kg/m²     Physical Exam  Vitals reviewed.   Constitutional:       General: He is not in acute distress.     Appearance: Normal appearance.   HENT:      Head: Normocephalic and atraumatic.      Nose: No congestion or rhinorrhea.      Comments: No evidence of active bleeding or blood clots in his nose     Mouth/Throat:      Comments: No evidence of active or dried blood in his mouth or pharynx  Eyes:      General:         Right eye: No discharge.         Left eye: No discharge.      Conjunctiva/sclera: Conjunctivae normal.   Cardiovascular:      Rate and Rhythm: Normal rate and regular rhythm.      Pulses: Normal pulses.      Heart sounds: Normal heart sounds.   Pulmonary:      Effort: Pulmonary effort is normal.      Breath sounds: Normal breath sounds.   Abdominal:      General: Abdomen is flat.      Palpations: Abdomen is soft.      Tenderness: There is no abdominal tenderness.   Musculoskeletal:      Cervical back: Neck supple. No tenderness.      Right lower leg: No edema.      Left lower leg: No edema.   Skin:     General: Skin is warm and dry. 
  [facility]  [address], [address]  [place] [address]      Pulmonary Progress Note      SUBJECTIVE:  reclining in bed on RA, still with hemoptysis, varying amounts with cough, had a large amount last night of dark blood with blood clots (showed me a basin with about 1.5-2 cups), [facility] and told of bronchoscopy scheduled around wednesday    OBJECTIVE    Medications    Continuous Infusions:   sodium chloride         Scheduled Meds:   ipratropium 0.5 mg-albuterol 2.5 mg  1 Dose Inhalation Q4H WA RT    carvedilol  12.5 mg Oral BID    pantoprazole  40 mg Oral QAM AC    sodium chloride  1 spray Nasal BID    sodium chloride flush  5-40 mL IntraVENous 2 times per day    fluticasone  2 spray Each Nostril Daily       PRN Meds:sodium chloride flush, sodium chloride, ondansetron **OR** ondansetron, polyethylene glycol, acetaminophen **OR** acetaminophen, benzonatate, diphenhydrAMINE    Physical    VITALS:  /76   Pulse 71   Temp 98.6 °F (37 °C) (Oral)   Resp 16   Ht 1.854 m (6' 1\")   Wt 84.5 kg (186 lb 4.6 oz)   SpO2 94%   BMI 24.58 kg/m²     24HR INTAKE/OUTPUT:      Intake/Output Summary (Last 24 hours) at 2024 1212  Last data filed at 2024 0653  Gross per 24 hour   Intake 130 ml   Output --   Net 130 ml       24HR PULSE OXIMETRY RANGE:    SpO2  Av.7 %  Min: 94 %  Max: 95 %    General appearance: alert, appears stated age, and cooperative  Lungs: rhonchi bilaterally with cough  Heart: regular rate and rhythm, S1, S2 normal, no murmur, click, rub or gallop  Abdomen: soft, non-tender; bowel sounds normal; no masses,  no organomegaly  Extremities: extremities normal, atraumatic, no cyanosis or edema  Neurologic: Mental status: Alert, oriented, thought content appropriate    Data    CBC:   Recent Labs     23  0027 24  0520   WBC 8.7 11.9*   HGB 13.2 12.4*   HCT 37.9 36.9*   MCV 93.8 93.7 
4 Eyes Skin Assessment     NAME:  Zachary Mcgraw  YOB: 1960  MEDICAL RECORD NUMBER:  [id number]    The patient is being assessed for  Admission    I agree that at least one RN has performed a thorough Head to Toe Skin Assessment on the patient. ALL assessment sites listed below have been assessed.      Areas assessed by both nurses:    Head, Face, Ears, Shoulders, Back, Chest, Arms, Elbows, Hands, Sacrum. Buttock, Coccyx, Ischium, Legs. Feet and Heels, and Under Medical Devices         Does the Patient have a Wound? No noted wound(s)       Cj Prevention initiated by RN: No  Wound Care Orders initiated by RN: No    Pressure Injury (Stage 3,4, Unstageable, DTI, NWPT, and Complex wounds) if present, place Wound referral order by RN under : No    New Ostomies, if present place, Ostomy referral order under : No     Nurse 1 eSignature: Electronically signed by Marlin Hung RN on 12/31/23 at 4:26 AM EST    **SHARE this note so that the co-signing nurse can place an eSignature**    Nurse 2 eSignature: Electronically signed by Teodora Novoa RN on 12/31/23 at 4:27 AM EST   
Consult sent to Dr. Quevedo via answering service  
Dr Terrell messaged that pt's daughter would like to speak with them  
Kalpesh and spoke w Dr Quevedo, notified him re patients hemoptysis. He ordered a stat chest xray and to re consult Otolaryngology.   
Perfect serve Dr Fairbanks re patient having hemoptysis 2x, Patient and wife are very concerned and wants  notifyosi for intervention. Waiting for call back. Called Dr Nav Granger to notify re patients concern. Dr Granger called back said they spoke w patients primary, and their consult was discontinued. He said He scoped the patient 10 days ago and was negative. Bleeding is not coming from the nose.  
Updated Dr Terrell that pt had been declined for transfer, ok for pt to stay overnight due to his concerns  
diet      Disposition: Patient likely can be discharged home today and go to [facility] ED for admission/bronchoscopy which was scheduled 1/3/2024.    Rebecca Terrell MD,   Family Medicine Resident PGY-3  01/01/24   10:05 AM

## 2024-01-02 LAB
DEPRECATED S PNEUM 1 IGG SER-MCNC: 0 AU/ML (ref 0–19)
EKG ATRIAL RATE: 66 BPM
EKG P AXIS: 62 DEGREES
EKG P-R INTERVAL: 158 MS
EKG Q-T INTERVAL: 438 MS
EKG QRS DURATION: 124 MS
EKG QTC CALCULATION (BAZETT): 459 MS
EKG R AXIS: 42 DEGREES
EKG T AXIS: 57 DEGREES
EKG VENTRICULAR RATE: 66 BPM
SERINE PROTEASE 3, IGG: 0 AU/ML (ref 0–19)

## 2024-01-02 PROCEDURE — 93010 ELECTROCARDIOGRAM REPORT: CPT | Performed by: INTERNAL MEDICINE

## 2024-01-02 ASSESSMENT — ENCOUNTER SYMPTOMS
WHEEZING: 0
BLOOD IN STOOL: 0
VOMITING: 0
COUGH: 1
NAUSEA: 0
SHORTNESS OF BREATH: 0

## 2024-01-02 NOTE — RT PROTOCOL NOTE
RT Inhaler-Nebulizer Bronchodilator Protocol Note    There is a bronchodilator order in the chart from a provider indicating to follow the RT Bronchodilator Protocol and there is an “Initiate RT Inhaler-Nebulizer Bronchodilator Protocol” order as well (see protocol at bottom of note).    CXR Findings:  XR CHEST PORTABLE    Result Date: 1/1/2024  Unchanged left upper lobe infiltrate. RECOMMENDATION: Careful clinical correlation and follow up recommended.     XR CHEST PORTABLE    Result Date: 12/31/2023  1.  Coarsened interstitial markings in lung hyperinflation.  COPD changes. Ill-defined opacity in the left upper lung which was described on recent CT scan. 2.  Atherosclerotic disease and cardiomegaly. RECOMMENDATION: Recommend follow-up chest CT 3-6 months after resolution of patient's symptoms to ensure complete resolution of opacification in the left upper lung.       The findings from the last RT Protocol Assessment were as follows:   History Pulmonary Disease: None or smoker <15 pack years  Respiratory Pattern: Regular pattern and RR 12-20 bpm  Breath Sounds: Clear breath sounds  Cough: Strong, spontaneous, non-productive  Indication for Bronchodilator Therapy: None  Bronchodilator Assessment Score: 0    Aerosolized bronchodilator medication orders have been revised according to the RT Inhaler-Nebulizer Bronchodilator Protocol below.    Respiratory Therapist to perform RT Therapy Protocol Assessment initially then follow the protocol.  Repeat RT Therapy Protocol Assessment PRN for score 0-3 or on second treatment, BID, and PRN for scores above 3.    No Indications - adjust the frequency to every 6 hours PRN wheezing or bronchospasm, if no treatments needed after 48 hours then discontinue using Per Protocol order mode.     If indication present, adjust the RT bronchodilator orders based on the Bronchodilator Assessment Score as indicated below.  Use Inhaler orders unless patient has one or more of the following:

## 2024-01-02 NOTE — DISCHARGE SUMMARY
[facility]  Discharge Summary      Patient ID:  Zachary Mcgraw  [id number]  63 y.o.  1960    Admit date: 12/30/2023    Discharge date and time: 1/1/2024    Location of discharge: [facility]     Admitting Physician: Mayte Posada MD     Discharge Physician: Rebecca Terrell MD    Consults: pulmonology and ENT    Admission Diagnoses: Hemoptysis [R04.2]    Discharge Diagnoses: Principal Problem:    Hemoptysis  Active Problems:    Left upper lobe consolidation (HCC)  Resolved Problems:    Sinus pressure      Hospital Course:     Zachary Mcgraw  is a 63 y.o. 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 He has since followed with his pulmonologist and established with [facility] with plan for bronchoscopy January 3.  The ER attempted to transfer to [facility] but they declined. Patients vitals and hemoglobin was stable. Patient received 1 dose of tranexamic acid in the ED. ENT were consulted and bedside nasopharyngoscopy showed no source of bleeding and minimal drak blood in posterior glottis. Pulmonology was consulted while inpatient and recommended further evaluation and management at tertiary care center - bronchoscopy vs IR embolization vs evlauation by CT surgery for possible lobectomy.   Patients family was able to reach out to pulmonologist at [facility] who arranged for direct admission for evaluation of IR embolization.  Patients vitals and hemoglobin remained stable throughout hospitalization and was stable for discharge.     Physical Exam  Vitals reviewed.   Constitutional:       General: He is not in acute distress.     Appearance: Normal appearance. He is not ill-appearing.   HENT:      Head: Normocephalic and atraumatic.      Nose: Nose normal.      Comments: No bleeding noted     Mouth/Throat:      Mouth: Mucous membranes are moist.

## 2024-01-03 ENCOUNTER — TELEPHONE (OUTPATIENT)
Dept: FAMILY MEDICINE CLINIC | Age: 64
End: 2024-01-03

## 2024-01-03 LAB — ANA SER QL IA: NEGATIVE

## 2024-01-03 NOTE — TELEPHONE ENCOUNTER
Care Transitions Initial Follow Up Call    Outreach made within 2 business days of discharge: Yes    Patient: Zachary Mcgraw Patient : 1960   MRN: [id number]  Reason for Admission: Hemoptysis  Discharge Date: 24       Spoke with: Jennifer     Discharge department/facility: [facility] Interactive Patient Contact:  Was patient able to fill all prescriptions: Yes  Was patient instructed to bring all medications to the follow-up visit: Yes  Is patient taking all medications as directed in the discharge summary? Yes  Does patient understand their discharge instructions: Yes  Does patient have questions or concerns that need addressed prior to 7-14 day follow up office visit: yes - canceled appt for hospital f/u  due to he is currently admitted at [facility].  Once discharged from there, will call to schedule TCM.    Scheduled appointment with PCP within 7-14 days    Follow Up  Future Appointments   Date Time Provider Department Center   2024  1:45 PM Cat Baca MD [place] [facility]   2024  3:00 PM [facility] [place] 1 [facility] CT/AUS [facility]   2024 11:30 AM Derek Cheek MD [facility]   2024  9:15 AM Kianna Varela MD [place] [facility]   12/10/2024  1:00 PM Swapnil Boudreaux DO [place] [facility]       KIANNA MARTINEZ RN

## 2024-01-05 LAB
MICROORGANISM SPEC CULT: NORMAL
MICROORGANISM SPEC CULT: NORMAL
SERVICE CMNT-IMP: NORMAL
SERVICE CMNT-IMP: NORMAL
SPECIMEN DESCRIPTION: NORMAL
SPECIMEN DESCRIPTION: NORMAL

## 2024-01-14 RX ORDER — PANTOPRAZOLE SODIUM 40 MG/1
40 TABLET, DELAYED RELEASE ORAL
Qty: 30 TABLET | Refills: 2 | Status: SHIPPED | OUTPATIENT
Start: 2024-01-14

## 2024-01-15 NOTE — PROGRESS NOTES
Gilson Ed  11/20/2018  Wt Readings from Last 3 Encounters:   11/20/18 199 lb (90.3 kg)   11/13/18 200 lb 12.8 oz (91.1 kg)   11/06/18 195 lb (88.5 kg)     Body mass index is 26.25 kg/m². Treatment Area:head and neck     Patient was seen today for weekly visit. Comfort Alteration  KPS:90%  Fatigue: Mild    Mucous Membrane Alteration  Mucositis Due to Radiation: Yes  Thrush: No  Voice Changes: Yes    Nutritional Alteration  Anorexia: No   Nausea: No   Vomiting: No     Skin Alteration   Sensation:tender    Radiation Dermatitis:  red    Ventilation Alteration  Cough:Yes    Emotional  Coping: effective    Injury, potential bleeding or infection: no    Radioprotectant Tolerance  Yes            Lab Results   Component Value Date    WBC 10.6 09/28/2018     09/28/2018         /84   Pulse 62   Wt 199 lb (90.3 kg)   BMI 26.25 kg/m²   BP within normal range? yes   -if no, manually recheck in 5-10 min  NEW BP reading:  BP within normal range? yes   -if no, notify attending provider for further instruction      Assessment/Plan: Patient is feeling fatigue and tired but he has been maintianing his weight. The patient is using magic mouth was for soreness in his throat and mouth. Patient is also using aquaphor for his skin.      Cherry Lindquist
Never smoker

## 2024-01-19 PROBLEM — J84.9 INTERSTITIAL LUNG DISEASE (HCC): Status: ACTIVE | Noted: 2024-01-02

## 2024-01-19 PROBLEM — J43.2 CENTRILOBULAR EMPHYSEMA (HCC): Status: ACTIVE | Noted: 2023-12-27

## 2024-01-19 PROBLEM — J84.10 PULMONARY FIBROSIS (HCC): Status: ACTIVE | Noted: 2024-01-07

## 2024-01-19 PROBLEM — F17.290 CIGAR SMOKER: Status: ACTIVE | Noted: 2023-12-27

## 2024-01-19 PROBLEM — R91.8 LEFT UPPER LOBE PULMONARY INFILTRATE: Status: ACTIVE | Noted: 2024-01-02

## 2024-01-19 PROBLEM — J14 PNEUMONIA DUE TO HAEMOPHILUS INFLUENZAE (HCC): Status: ACTIVE | Noted: 2024-01-07

## 2024-01-19 RX ORDER — SODIUM FLUORIDE 6 MG/ML
PASTE, DENTIFRICE DENTAL
COMMUNITY
Start: 2023-11-03

## 2024-01-19 RX ORDER — AMOXICILLIN AND CLAVULANATE POTASSIUM 875; 125 MG/1; MG/1
1 TABLET, FILM COATED ORAL 2 TIMES DAILY
COMMUNITY
Start: 2024-01-08

## 2024-01-19 RX ORDER — VORICONAZOLE 200 MG/1
300 TABLET, FILM COATED ORAL 2 TIMES DAILY
COMMUNITY
Start: 2024-01-18 | End: 2024-02-17

## 2024-01-19 NOTE — PROGRESS NOTES
Pt discharged from [facility].  Taken off ASA.  Treated for h. Influenza with augmentin, possible aspergillis w voriconzaole.  Needs repeat CT scan mid-Feb.  Problem list updated, med list updated.

## 2024-01-25 ENCOUNTER — TELEPHONE (OUTPATIENT)
Dept: FAMILY MEDICINE CLINIC | Age: 64
End: 2024-01-25

## 2024-01-25 NOTE — TELEPHONE ENCOUNTER
Called and talked with his wife.  He is doing well.    The bleeding stopped.  It was a blood clot that was causing the bleeding.  He off the meds.  He has seen the ID doc on Monday.  Seeing pulm on 2/5.

## 2024-02-19 DIAGNOSIS — I10 BENIGN ESSENTIAL HYPERTENSION: ICD-10-CM

## 2024-02-19 DIAGNOSIS — I48.91 ATRIAL FIBRILLATION, UNSPECIFIED TYPE (HCC): Chronic | ICD-10-CM

## 2024-02-22 PROBLEM — J18.9 PNEUMONIA DUE TO INFECTIOUS ORGANISM: Status: RESOLVED | Noted: 2023-12-17 | Resolved: 2024-02-22

## 2024-02-22 PROBLEM — R91.8 LEFT UPPER LOBE PULMONARY INFILTRATE: Status: RESOLVED | Noted: 2024-01-02 | Resolved: 2024-02-22

## 2024-02-22 PROBLEM — J18.1 LEFT UPPER LOBE CONSOLIDATION (HCC): Status: RESOLVED | Noted: 2023-12-31 | Resolved: 2024-02-22

## 2024-02-22 PROBLEM — J14 PNEUMONIA DUE TO HAEMOPHILUS INFLUENZAE (HCC): Status: RESOLVED | Noted: 2024-01-07 | Resolved: 2024-02-22

## 2024-02-22 RX ORDER — CARVEDILOL 12.5 MG/1
12.5 TABLET ORAL 2 TIMES DAILY
Qty: 180 TABLET | Refills: 0 | Status: SHIPPED | OUTPATIENT
Start: 2024-02-22

## 2024-02-22 NOTE — PROGRESS NOTES
Zachary Mcgraw (:  1960) is a 63 y.o. male,Established patient, here for evaluation of the following chief complaint(s):  Annual Exam         ASSESSMENT/PLAN:  1. Centrilobular emphysema (HCC)  Pt following closely with [facility].  Has see ID and pulm.  Was taken off anti-fungal and not felt to have fungal pneumonia.  Was treated for CAP here in [facility].  Found to have a clot an emphysema at [facility].  Hemoptysis stopped.  Pt had had lots of coughing prior to getting the cough.  Pt has now stopped all tobacco products.   Pt ed on vaccines to prevent various types of pneumonia but he is currently not interested.   Pt not SOB.  Does not currently need meds for his COPD.   Notes from [facility] reviewed.  Labs and imaging from the hospital reviewed.   Med list updated.  2. Chronic bronchitis with productive mucopurulent cough (HCC)  Pt's cough has gone away since stopping the tobacco.  He feels much better and is continuing to exercise, lift weights and eat a varied diet.  3. Paroxysmal atrial fibrillation (HCC)  Pt saw cardiology.  Coreg continued.  Pt had an ablation and has not had a recurrence of his afib since that time.  Is stable on the coreg.   Does not want treated with a statin and so does not want his FLP checked.   4. Interstitial lung disease (HCC)  I don't believe patient has ILD.  He has emphysema.  This was misdiagnosed in the hospital and placed on his chart.  It has been removed.  5. Pulmonary fibrosis (HCC)  Same as above.  6. Caffeine abuse (HCC)  Pt no longer drinking a lot of caffeine.  He drinks in the AM.      Return in about 6 months (around 2024) for lung disease.         Subjective   SUBJECTIVE/OBJECTIVE:  HPI    Needs vaccines - flu, pneumonia, RSV.  He is not interested.   Update meds.  Done.   He quit smoking.    He is still exercising - walking and lifting.  Hanging with grandkids.    He feels better since quitting smoking.  Can smell more.  He feels better.     In the

## 2024-02-23 ENCOUNTER — OFFICE VISIT (OUTPATIENT)
Dept: FAMILY MEDICINE CLINIC | Age: 64
End: 2024-02-23
Payer: COMMERCIAL

## 2024-02-23 VITALS
SYSTOLIC BLOOD PRESSURE: 130 MMHG | RESPIRATION RATE: 18 BRPM | TEMPERATURE: 98 F | HEART RATE: 69 BPM | WEIGHT: 200 LBS | HEIGHT: 73 IN | DIASTOLIC BLOOD PRESSURE: 84 MMHG | OXYGEN SATURATION: 96 % | BODY MASS INDEX: 26.51 KG/M2

## 2024-02-23 DIAGNOSIS — J84.10 PULMONARY FIBROSIS (HCC): ICD-10-CM

## 2024-02-23 DIAGNOSIS — F15.10 CAFFEINE ABUSE (HCC): ICD-10-CM

## 2024-02-23 DIAGNOSIS — J43.2 CENTRILOBULAR EMPHYSEMA (HCC): ICD-10-CM

## 2024-02-23 DIAGNOSIS — J41.1 CHRONIC BRONCHITIS WITH PRODUCTIVE MUCOPURULENT COUGH (HCC): ICD-10-CM

## 2024-02-23 DIAGNOSIS — I48.0 PAROXYSMAL ATRIAL FIBRILLATION (HCC): Chronic | ICD-10-CM

## 2024-02-23 DIAGNOSIS — J84.9 INTERSTITIAL LUNG DISEASE (HCC): ICD-10-CM

## 2024-02-23 PROBLEM — Z87.898 HISTORY OF HEMOPTYSIS: Status: ACTIVE | Noted: 2024-02-05

## 2024-02-23 PROBLEM — J43.8 PARASEPTAL EMPHYSEMA (HCC): Status: ACTIVE | Noted: 2023-12-27

## 2024-02-23 PROBLEM — F17.290 CIGAR SMOKER: Status: RESOLVED | Noted: 2023-12-27 | Resolved: 2024-02-23

## 2024-02-23 PROBLEM — K92.0 HEMATEMESIS: Status: RESOLVED | Noted: 2023-12-18 | Resolved: 2024-02-23

## 2024-02-23 PROCEDURE — 99214 OFFICE O/P EST MOD 30 MIN: CPT | Performed by: FAMILY MEDICINE

## 2024-02-23 PROCEDURE — 3079F DIAST BP 80-89 MM HG: CPT | Performed by: FAMILY MEDICINE

## 2024-02-23 PROCEDURE — 3075F SYST BP GE 130 - 139MM HG: CPT | Performed by: FAMILY MEDICINE

## 2024-02-23 SDOH — ECONOMIC STABILITY: HOUSING INSECURITY
IN THE LAST 12 MONTHS, WAS THERE A TIME WHEN YOU DID NOT HAVE A STEADY PLACE TO SLEEP OR SLEPT IN A SHELTER (INCLUDING NOW)?: NO

## 2024-02-23 SDOH — ECONOMIC STABILITY: FOOD INSECURITY: WITHIN THE PAST 12 MONTHS, YOU WORRIED THAT YOUR FOOD WOULD RUN OUT BEFORE YOU GOT MONEY TO BUY MORE.: NEVER TRUE

## 2024-02-23 SDOH — ECONOMIC STABILITY: FOOD INSECURITY: WITHIN THE PAST 12 MONTHS, THE FOOD YOU BOUGHT JUST DIDN'T LAST AND YOU DIDN'T HAVE MONEY TO GET MORE.: NEVER TRUE

## 2024-02-23 SDOH — ECONOMIC STABILITY: INCOME INSECURITY: HOW HARD IS IT FOR YOU TO PAY FOR THE VERY BASICS LIKE FOOD, HOUSING, MEDICAL CARE, AND HEATING?: NOT HARD AT ALL

## 2024-02-23 ASSESSMENT — PATIENT HEALTH QUESTIONNAIRE - PHQ9
SUM OF ALL RESPONSES TO PHQ QUESTIONS 1-9: 0
SUM OF ALL RESPONSES TO PHQ QUESTIONS 1-9: 0
2. FEELING DOWN, DEPRESSED OR HOPELESS: 0
SUM OF ALL RESPONSES TO PHQ QUESTIONS 1-9: 0
SUM OF ALL RESPONSES TO PHQ QUESTIONS 1-9: 0
1. LITTLE INTEREST OR PLEASURE IN DOING THINGS: 0
SUM OF ALL RESPONSES TO PHQ9 QUESTIONS 1 & 2: 0

## 2024-02-23 ASSESSMENT — ENCOUNTER SYMPTOMS
COUGH: 0
BACK PAIN: 0
RHINORRHEA: 0
BLOOD IN STOOL: 0
DIARRHEA: 0
CHEST TIGHTNESS: 0
ABDOMINAL PAIN: 0
CONSTIPATION: 0
SHORTNESS OF BREATH: 0
WHEEZING: 0
EYE DISCHARGE: 0
EYE ITCHING: 0
VOMITING: 0

## 2024-02-23 NOTE — PROGRESS NOTES
This patient was screened with SUBS screening tool.   On 2/23/2024 the patient has a Positive Screen and the result can be found scanned into the chart

## 2024-05-19 DIAGNOSIS — I10 BENIGN ESSENTIAL HYPERTENSION: ICD-10-CM

## 2024-05-19 DIAGNOSIS — I48.91 ATRIAL FIBRILLATION, UNSPECIFIED TYPE (HCC): Chronic | ICD-10-CM

## 2024-05-20 RX ORDER — CARVEDILOL 12.5 MG/1
12.5 TABLET ORAL 2 TIMES DAILY
Qty: 180 TABLET | Refills: 0 | Status: SHIPPED | OUTPATIENT
Start: 2024-05-20

## 2024-06-20 ENCOUNTER — TELEPHONE (OUTPATIENT)
Dept: ENT CLINIC | Age: 64
End: 2024-06-20

## 2024-06-20 NOTE — TELEPHONE ENCOUNTER
Pt called in, he is john on 10/22/24 for rt ear pain, he said the pain is going down into his teeth and he cannot wait that long, he would like to know if he can be seen sooner? Please, advise. Zachary can be reached at [phone]. Thank you

## 2024-06-20 NOTE — TELEPHONE ENCOUNTER
Called patient in regards to current symptoms patient has ear pain, had dental work done, implants placed dental states everything looks well has sinus problems has been having right ear pain for a month feels miserable.

## 2024-07-02 ENCOUNTER — TELEPHONE (OUTPATIENT)
Dept: ENT CLINIC | Age: 64
End: 2024-07-02

## 2024-07-02 NOTE — TELEPHONE ENCOUNTER
Pt has an appt with Dr Boudreaux on 07/09 but is having really bad ear pain.  There are no sooner appts, and wife is asking what he can do that will help ease the pain in the meantime. She said that he just finished a dose of antibiotics [facility].

## 2024-07-02 NOTE — TELEPHONE ENCOUNTER
I spoke with patient, he will f/u with PCP and use otc pain medication for pain management until scheduled appt on 7/9 with Dr. Boudreaux    Electronically signed by Morena Her MA on 7/2/24 at 3:01 PM EDT

## 2024-07-03 ENCOUNTER — OFFICE VISIT (OUTPATIENT)
Dept: FAMILY MEDICINE CLINIC | Age: 64
End: 2024-07-03
Payer: MEDICARE

## 2024-07-03 VITALS
HEART RATE: 66 BPM | DIASTOLIC BLOOD PRESSURE: 89 MMHG | BODY MASS INDEX: 25.73 KG/M2 | OXYGEN SATURATION: 97 % | SYSTOLIC BLOOD PRESSURE: 126 MMHG | RESPIRATION RATE: 19 BRPM | TEMPERATURE: 97.7 F | HEIGHT: 72 IN | WEIGHT: 190 LBS

## 2024-07-03 DIAGNOSIS — R07.89 ATYPICAL CHEST PAIN: Primary | ICD-10-CM

## 2024-07-03 DIAGNOSIS — R07.9 CHEST PAIN, UNSPECIFIED TYPE: ICD-10-CM

## 2024-07-03 DIAGNOSIS — R06.02 SHORTNESS OF BREATH: ICD-10-CM

## 2024-07-03 PROCEDURE — 3074F SYST BP LT 130 MM HG: CPT | Performed by: STUDENT IN AN ORGANIZED HEALTH CARE EDUCATION/TRAINING PROGRAM

## 2024-07-03 PROCEDURE — 3079F DIAST BP 80-89 MM HG: CPT | Performed by: STUDENT IN AN ORGANIZED HEALTH CARE EDUCATION/TRAINING PROGRAM

## 2024-07-03 PROCEDURE — 93000 ELECTROCARDIOGRAM COMPLETE: CPT | Performed by: STUDENT IN AN ORGANIZED HEALTH CARE EDUCATION/TRAINING PROGRAM

## 2024-07-03 PROCEDURE — 99213 OFFICE O/P EST LOW 20 MIN: CPT | Performed by: STUDENT IN AN ORGANIZED HEALTH CARE EDUCATION/TRAINING PROGRAM

## 2024-07-03 PROCEDURE — 1123F ACP DISCUSS/DSCN MKR DOCD: CPT | Performed by: STUDENT IN AN ORGANIZED HEALTH CARE EDUCATION/TRAINING PROGRAM

## 2024-07-03 RX ORDER — NITROGLYCERIN 0.4 MG/1
0.4 TABLET SUBLINGUAL EVERY 5 MIN PRN
Qty: 25 TABLET | Refills: 0 | Status: SHIPPED | OUTPATIENT
Start: 2024-07-03

## 2024-07-03 NOTE — PROGRESS NOTES
S: 64 y.o. male with   Chief Complaint   Patient presents with    Congestion     No coughing, some congestion  Some chest heaviness since Saturday 6/29/2024  Just finished amoxicillin course Monday for ear infection       Pt is here bc of congestion.    He was on amoxicillin for an ear infection.  Just got back from [place] on Saturday.  Pt now feeling some heaviness in the chest.  No SOB.  Was carrying grandson during the vacation.      O: VS:  height is 1.829 m (6') and weight is 86.2 kg (190 lb). His temporal temperature is 97.7 °F (36.5 °C). His blood pressure is 126/89 and his pulse is 66. His respiration is 19 and oxygen saturation is 97%.   BP Readings from Last 3 Encounters:   07/03/24 126/89   02/23/24 130/84   01/01/24 124/76     See resident note    Impression/Plan:   1) chest heaviness - ECG today.  Cardiac stress test recommended.  Pt wants to see his cardiologist.  Referral made.      Health Maintenance Due   Topic Date Due    COVID-19 Vaccine (1) Never done    HIV screen  Never done    Hepatitis C screen  Never done    Shingles vaccine (1 of 2) Never done    Pneumococcal 65+ years Vaccine (2 of 2 - PCV) 05/20/2016    Respiratory Syncytial Virus (RSV) Pregnant or age 60 yrs+ (1 - 1-dose 60+ series) Never done    Annual Wellness Visit (Medicare Advantage)  Never done         Attending Physician Statement  I have discussed the case, including pertinent history and exam findings with the resident.  I agree with the documented assessment and plan.      Kianna Varela MD

## 2024-07-03 NOTE — PROGRESS NOTES
[facility]  Family Medicine Residency  Phone: [phone]  Fax: [phone]    Patient:  Zachary Mcgraw 64 y.o. male                                 Date of Service: 7/3/24                            Chiefcomplaint:   Chief Complaint   Patient presents with    Congestion     No coughing, some congestion  Some chest heaviness since Saturday 6/29/2024  Just finished amoxicillin course Monday for ear infection         History of Present Illness:     The patient is a 64 y.o. male  presented to the clinic with complaints as above.    Had tooth pulled in May, was on ABX, his face swelled up.    Last couple of week had right sided ear pain. HX of sinus problems. He is going to see an ENT on Friday.     Just got back from vacation last Saturday, went to [place]. They drove. No hx of blood clots. That night he noticed some chest heaviness. His pectoral muscles hurt, he was carrying is grandson a lot. Is having some trouble breathing. Not short of breath. Is having to take deep breaths though. No new cough. . No fevers or chills. No abdominal pain. No nausea or vomiting. No diarrhea or constipation. No coughing blood.     Jayleen has runny nose.     Lung surgery in January at the [facility] for throwing up blood, says the blood was coming from the lungs. No issues since.     Hx of laryngeal cancer, sp radiation. No surgery. Is going to see ENT Friday     Hx of afib, sp ablation. Does not feel like that    Saw Dr. Sheth at [facility] about 6 months ago for his normal follow up.       Past Medical History:      Diagnosis Date    Afib (HCC)     Anxiety     Caffeine abuse (HCC)     12 cups per day    Cardiomyopathy (HCC)     Domenic, now back to normal    Chronic bilateral low back pain without sciatica 12/04/2019    Chronic bronchitis (HCC)     Chronic rhinitis     exposed to dust    COPD (chronic obstructive pulmonary disease) ([facility]) 05/20/2015    Mild per PFT's 2014 [facility]    COVID-19 11/23/2021

## 2024-07-09 ENCOUNTER — OFFICE VISIT (OUTPATIENT)
Dept: ENT CLINIC | Age: 64
End: 2024-07-09
Payer: MEDICARE

## 2024-07-09 ENCOUNTER — TELEPHONE (OUTPATIENT)
Dept: FAMILY MEDICINE CLINIC | Age: 64
End: 2024-07-09

## 2024-07-09 VITALS
SYSTOLIC BLOOD PRESSURE: 126 MMHG | OXYGEN SATURATION: 93 % | WEIGHT: 187 LBS | TEMPERATURE: 97.6 F | HEART RATE: 63 BPM | BODY MASS INDEX: 25.33 KG/M2 | HEIGHT: 72 IN | DIASTOLIC BLOOD PRESSURE: 87 MMHG

## 2024-07-09 DIAGNOSIS — J30.1 SEASONAL ALLERGIC RHINITIS DUE TO POLLEN: Primary | ICD-10-CM

## 2024-07-09 DIAGNOSIS — H92.01 OTALGIA, RIGHT EAR: ICD-10-CM

## 2024-07-09 DIAGNOSIS — R07.9 CHEST PAIN, UNSPECIFIED TYPE: Primary | ICD-10-CM

## 2024-07-09 DIAGNOSIS — C32.0 SQUAMOUS CELL CARCINOMA OF LEFT VOCAL CORD (HCC): ICD-10-CM

## 2024-07-09 PROCEDURE — 3079F DIAST BP 80-89 MM HG: CPT | Performed by: OTOLARYNGOLOGY

## 2024-07-09 PROCEDURE — 3074F SYST BP LT 130 MM HG: CPT | Performed by: OTOLARYNGOLOGY

## 2024-07-09 PROCEDURE — 99213 OFFICE O/P EST LOW 20 MIN: CPT | Performed by: OTOLARYNGOLOGY

## 2024-07-09 PROCEDURE — 1123F ACP DISCUSS/DSCN MKR DOCD: CPT | Performed by: OTOLARYNGOLOGY

## 2024-07-09 ASSESSMENT — ENCOUNTER SYMPTOMS
ABDOMINAL PAIN: 0
GASTROINTESTINAL NEGATIVE: 1
SHORTNESS OF BREATH: 0
SORE THROAT: 0
RESPIRATORY NEGATIVE: 1
EYES NEGATIVE: 1
COLOR CHANGE: 0
TROUBLE SWALLOWING: 0
VOICE CHANGE: 0

## 2024-07-09 NOTE — TELEPHONE ENCOUNTER
----- Message from Imani Caal sent at 7/9/2024 10:56 AM EDT -----  Regarding: E[facility] Referral Request  E[facility] Referral Request    Reason for referral request: specialty provider-cardiologist    Specialist/Lab/Test patient is requesting (if known):stress test    Specialist Phone Number (if applicable):    Additional Information as per the wife the patient is need for stress test at the practice anyone is okay as long as her  can take the test. Please call back the patient /or wife for further assistance. Thank you  Provider;Kianna Varela MD  --------------------------------------------------------------------------------------------------------------------------    Relationship to Patient: Spouse/Partner     Call Back Information: OK to leave message on voicemail  Preferred Call Back Number: Phone [phone]

## 2024-07-09 NOTE — TELEPHONE ENCOUNTER
Left detailed message on wife's VM     Stress and echo ordered.   Patient can call [facility] @   [phone] to schedule the tests.

## 2024-07-09 NOTE — TELEPHONE ENCOUNTER
Stress and cardiac echo ordered.  Please let patient and wife know. They will be done here at [facility].  We can send any results to his cardiologist.

## 2024-07-09 NOTE — PROGRESS NOTES
for shortness of breath.    Cardiovascular: Negative.  Negative for chest pain.   Gastrointestinal: Negative.  Negative for abdominal pain.   Genitourinary: Negative.    Musculoskeletal: Negative.    Skin: Negative.  Negative for color change.   Neurological: Negative.    Psychiatric/Behavioral: Negative.  Negative for behavioral problems and hallucinations.    All other systems reviewed and are negative.              Objective:     Vitals:    07/09/24 1443   BP: 126/87   Pulse: 63   Temp: 97.6 °F (36.4 °C)   SpO2: 93%     Physical Exam  Constitutional:       General: He is not in acute distress.     Appearance: He is not diaphoretic.   HENT:      Head: Normocephalic and atraumatic.      Right Ear: Tympanic membrane and external ear normal.      Left Ear: Tympanic membrane and external ear normal.      Mouth/Throat:      Mouth: Mucous membranes are pale.      Pharynx: No oropharyngeal exudate.   Eyes:      Conjunctiva/sclera: Conjunctivae normal.      Pupils: Pupils are equal, round, and reactive to light.   Pulmonary:      Effort: Pulmonary effort is normal. No respiratory distress.   Abdominal:      General: There is no distension.   Musculoskeletal:         General: Normal range of motion.      Cervical back: Normal range of motion and neck supple.   Lymphadenopathy:      Cervical: No cervical adenopathy.   Skin:     General: Skin is warm and dry.   Neurological:      Mental Status: He is alert and oriented to person, place, and time.                 Assessment:       Diagnosis Orders   1. Seasonal allergic rhinitis due to pollen        2. 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 Otalgia, right ear                   Plan:      Allergic rhinitis  I would like the patient to start  fluticasone (Flonase) and have instructed them to use it daily at bedtime.  Call or return to clinic prn if these symptoms worsen or fail to improve as anticipated.    Discussed management of the allergy medicine with

## 2024-07-09 NOTE — TELEPHONE ENCOUNTER
Cannot get into Dr Sheth for 3 months. They would not like to wait that long for the stress test. Can you please order?

## 2024-07-10 ENCOUNTER — TELEPHONE (OUTPATIENT)
Dept: CARDIOLOGY | Age: 64
End: 2024-07-10

## 2024-07-10 NOTE — TELEPHONE ENCOUNTER
Left message to schedule echo and Lexiscan stress test.  Electronically signed by Cindy Plummer on 7/10/2024 at 11:21 AM

## 2024-07-15 ENCOUNTER — TELEPHONE (OUTPATIENT)
Dept: CARDIOLOGY | Age: 64
End: 2024-07-15

## 2024-07-15 NOTE — TELEPHONE ENCOUNTER
Spoke w/ patient to confirm stress test for Wednesday, 7/17/24, starting at 0830.  Instructions given and medications reviewed.  Patient instructed to hold Coreg for 12 hours prior to test and no caffeine products for 12 hours prior to test.  All questions answered.

## 2024-07-17 ENCOUNTER — HOSPITAL ENCOUNTER (OUTPATIENT)
Dept: CARDIOLOGY | Age: 64
Discharge: HOME | End: 2024-07-19
Payer: MEDICARE

## 2024-07-17 VITALS
HEART RATE: 58 BPM | DIASTOLIC BLOOD PRESSURE: 74 MMHG | SYSTOLIC BLOOD PRESSURE: 102 MMHG | HEIGHT: 72 IN | WEIGHT: 187 LBS | BODY MASS INDEX: 25.33 KG/M2 | RESPIRATION RATE: 16 BRPM

## 2024-07-17 DIAGNOSIS — R07.9 CHEST PAIN, UNSPECIFIED TYPE: ICD-10-CM

## 2024-07-17 PROCEDURE — A9500 TC99M SESTAMIBI: HCPCS | Performed by: INTERNAL MEDICINE

## 2024-07-17 PROCEDURE — 3430000000 HC RX DIAGNOSTIC RADIOPHARMACEUTICAL: Performed by: INTERNAL MEDICINE

## 2024-07-17 PROCEDURE — 93306 TTE W/DOPPLER COMPLETE: CPT

## 2024-07-17 PROCEDURE — 93017 CV STRESS TEST TRACING ONLY: CPT

## 2024-07-17 PROCEDURE — 6360000002 HC RX W HCPCS: Performed by: FAMILY MEDICINE

## 2024-07-17 PROCEDURE — 2580000003 HC RX 258: Performed by: INTERNAL MEDICINE

## 2024-07-17 RX ORDER — SODIUM CHLORIDE 0.9 % (FLUSH) 0.9 %
10 SYRINGE (ML) INJECTION PRN
Status: DISCONTINUED | OUTPATIENT
Start: 2024-07-17 | End: 2024-07-20 | Stop reason: HOSPADM

## 2024-07-17 RX ORDER — TETRAKIS(2-METHOXYISOBUTYLISOCYANIDE)COPPER(I) TETRAFLUOROBORATE 1 MG/ML
10.5 INJECTION, POWDER, LYOPHILIZED, FOR SOLUTION INTRAVENOUS
Status: COMPLETED | OUTPATIENT
Start: 2024-07-17 | End: 2024-07-17

## 2024-07-17 RX ORDER — TETRAKIS(2-METHOXYISOBUTYLISOCYANIDE)COPPER(I) TETRAFLUOROBORATE 1 MG/ML
31.1 INJECTION, POWDER, LYOPHILIZED, FOR SOLUTION INTRAVENOUS
Status: COMPLETED | OUTPATIENT
Start: 2024-07-17 | End: 2024-07-17

## 2024-07-17 RX ORDER — REGADENOSON 0.08 MG/ML
0.4 INJECTION, SOLUTION INTRAVENOUS
Status: COMPLETED | OUTPATIENT
Start: 2024-07-17 | End: 2024-07-17

## 2024-07-17 RX ADMIN — SODIUM CHLORIDE, PRESERVATIVE FREE 10 ML: 5 INJECTION INTRAVENOUS at 10:50

## 2024-07-17 RX ADMIN — Medication 10.5 MILLICURIE: at 08:08

## 2024-07-17 RX ADMIN — REGADENOSON 0.4 MG: 0.08 INJECTION, SOLUTION INTRAVENOUS at 10:49

## 2024-07-17 RX ADMIN — Medication 31.1 MILLICURIE: at 10:49

## 2024-07-17 RX ADMIN — SODIUM CHLORIDE, PRESERVATIVE FREE 10 ML: 5 INJECTION INTRAVENOUS at 10:48

## 2024-07-17 RX ADMIN — SODIUM CHLORIDE, PRESERVATIVE FREE 10 ML: 5 INJECTION INTRAVENOUS at 08:08

## 2024-07-18 LAB
ECHO AO ASC DIAM: 3.2 CM
ECHO AO ASCENDING AORTA INDEX: 1.55 CM/M2
ECHO AV AREA PEAK VELOCITY: 3.1 CM2
ECHO AV AREA VTI: 2.6 CM2
ECHO AV AREA/BSA PEAK VELOCITY: 1.5 CM2/M2
ECHO AV AREA/BSA VTI: 1.3 CM2/M2
ECHO AV CUSP MM: 2.1 CM
ECHO AV MEAN GRADIENT: 2 MMHG
ECHO AV MEAN VELOCITY: 0.6 M/S
ECHO AV PEAK GRADIENT: 3 MMHG
ECHO AV PEAK VELOCITY: 0.9 M/S
ECHO AV VELOCITY RATIO: 0.89
ECHO AV VTI: 21.2 CM
ECHO BSA: 2.08 M2
ECHO BSA: 2.08 M2
ECHO EST RA PRESSURE: 3 MMHG
ECHO LA DIAMETER INDEX: 1.98 CM/M2
ECHO LA DIAMETER: 4.1 CM
ECHO LA VOL A-L A2C: 67 ML (ref 18–58)
ECHO LA VOL A-L A4C: 60 ML (ref 18–58)
ECHO LA VOL MOD A2C: 66 ML (ref 18–58)
ECHO LA VOL MOD A4C: 53 ML (ref 18–58)
ECHO LA VOLUME AREA LENGTH: 67 ML
ECHO LA VOLUME INDEX A-L A2C: 32 ML/M2 (ref 16–34)
ECHO LA VOLUME INDEX A-L A4C: 29 ML/M2 (ref 16–34)
ECHO LA VOLUME INDEX AREA LENGTH: 32 ML/M2 (ref 16–34)
ECHO LA VOLUME INDEX MOD A2C: 32 ML/M2 (ref 16–34)
ECHO LA VOLUME INDEX MOD A4C: 26 ML/M2 (ref 16–34)
ECHO LV EF PHYSICIAN: 60 %
ECHO LV FRACTIONAL SHORTENING: 29 % (ref 28–44)
ECHO LV INTERNAL DIMENSION DIASTOLE INDEX: 2.71 CM/M2
ECHO LV INTERNAL DIMENSION DIASTOLIC: 5.6 CM (ref 4.2–5.9)
ECHO LV INTERNAL DIMENSION SYSTOLIC INDEX: 1.93 CM/M2
ECHO LV INTERNAL DIMENSION SYSTOLIC: 4 CM
ECHO LV ISOVOLUMETRIC RELAXATION TIME (IVRT): 110.7 MS
ECHO LV IVSD: 0.7 CM (ref 0.6–1)
ECHO LV IVSS: 1.2 CM
ECHO LV MASS 2D: 152.3 G (ref 88–224)
ECHO LV MASS INDEX 2D: 73.6 G/M2 (ref 49–115)
ECHO LV POSTERIOR WALL DIASTOLIC: 0.8 CM (ref 0.6–1)
ECHO LV POSTERIOR WALL SYSTOLIC: 1.2 CM
ECHO LV RELATIVE WALL THICKNESS RATIO: 0.29
ECHO LVOT AREA: 3.5 CM2
ECHO LVOT AV VTI INDEX: 0.76
ECHO LVOT DIAM: 2.1 CM
ECHO LVOT MEAN GRADIENT: 1 MMHG
ECHO LVOT PEAK GRADIENT: 3 MMHG
ECHO LVOT PEAK VELOCITY: 0.8 M/S
ECHO LVOT STROKE VOLUME INDEX: 26.9 ML/M2
ECHO LVOT SV: 55.7 ML
ECHO LVOT VTI: 16.1 CM
ECHO MV A VELOCITY: 0.71 M/S
ECHO MV AREA PHT: 2.3 CM2
ECHO MV AREA VTI: 1.6 CM2
ECHO MV E DECELERATION TIME (DT): 252.8 MS
ECHO MV E VELOCITY: 0.59 M/S
ECHO MV E/A RATIO: 0.83
ECHO MV LVOT VTI INDEX: 2.17
ECHO MV MAX VELOCITY: 0.9 M/S
ECHO MV MEAN GRADIENT: 1 MMHG
ECHO MV MEAN VELOCITY: 0.5 M/S
ECHO MV PEAK GRADIENT: 3 MMHG
ECHO MV PRESSURE HALF TIME (PHT): 95.1 MS
ECHO MV REGURGITANT PEAK GRADIENT: 0 MMHG
ECHO MV REGURGITANT PEAK VELOCITY: 0.1 M/S
ECHO MV VTI: 35 CM
ECHO PV MAX VELOCITY: 0.9 M/S
ECHO PV MEAN GRADIENT: 1 MMHG
ECHO PV MEAN VELOCITY: 0.6 M/S
ECHO PV PEAK GRADIENT: 3 MMHG
ECHO PV VTI: 20.8 CM
ECHO PVEIN A DURATION: 101.5 MS
ECHO PVEIN A VELOCITY: 0.2 M/S
ECHO PVEIN PEAK D VELOCITY: 0.6 M/S
ECHO PVEIN PEAK S VELOCITY: 0.4 M/S
ECHO PVEIN S/D RATIO: 0.7
ECHO RIGHT VENTRICULAR SYSTOLIC PRESSURE (RVSP): 15 MMHG
ECHO TV REGURGITANT MAX VELOCITY: 1.72 M/S
ECHO TV REGURGITANT PEAK GRADIENT: 12 MMHG
NUC REST EJECTION FRACTION: 58 %
STRESS BASELINE DIAS BP: 74 MMHG
STRESS BASELINE HR: 58 BPM
STRESS BASELINE SYS BP: 102 MMHG
STRESS ESTIMATED WORKLOAD: 1.1 METS
STRESS O2 SAT REST: 95 %
STRESS PEAK DIAS BP: 70 MMHG
STRESS PEAK SYS BP: 130 MMHG
STRESS PERCENT HR ACHIEVED: 52 %
STRESS POST PEAK HR: 81 BPM
STRESS RATE PRESSURE PRODUCT: NORMAL BPM*MMHG
STRESS TARGET HR: 155 BPM

## 2024-07-18 PROCEDURE — 93306 TTE W/DOPPLER COMPLETE: CPT | Performed by: INTERNAL MEDICINE

## 2024-07-18 NOTE — RESULT ENCOUNTER NOTE
Please call patient.  His stress test came back. It shows that he is not at risk of a heart attack but I want his cardiologist to see it bc the dye did not go into the bottom part of his heart and I don't have an explanation as to why.   This may be something that happens but since I don't read these tests, I don't have this information.  Please fax to [facility].  Pt needs an appt with his cardiologist to discuss the results.   nika

## 2024-07-19 NOTE — RESULT ENCOUNTER NOTE
Please send this result to Zacahry's cardiologist.  Thanks.  Let the patient know that his cardiac echo was normal.    nika

## 2024-08-01 DIAGNOSIS — I48.91 ATRIAL FIBRILLATION, UNSPECIFIED TYPE (HCC): Chronic | ICD-10-CM

## 2024-08-01 DIAGNOSIS — I10 BENIGN ESSENTIAL HYPERTENSION: ICD-10-CM

## 2024-08-01 RX ORDER — CARVEDILOL 12.5 MG/1
12.5 TABLET ORAL 2 TIMES DAILY
Qty: 180 TABLET | Refills: 0 | Status: SHIPPED | OUTPATIENT
Start: 2024-08-01

## 2024-08-09 ENCOUNTER — OFFICE VISIT (OUTPATIENT)
Dept: ENT CLINIC | Age: 64
End: 2024-08-09

## 2024-08-09 VITALS
BODY MASS INDEX: 25.06 KG/M2 | SYSTOLIC BLOOD PRESSURE: 134 MMHG | DIASTOLIC BLOOD PRESSURE: 89 MMHG | WEIGHT: 185 LBS | HEIGHT: 72 IN | HEART RATE: 61 BPM | OXYGEN SATURATION: 95 %

## 2024-08-09 DIAGNOSIS — J01.00 SUBACUTE MAXILLARY SINUSITIS: ICD-10-CM

## 2024-08-09 DIAGNOSIS — J30.1 SEASONAL ALLERGIC RHINITIS DUE TO POLLEN: Primary | ICD-10-CM

## 2024-08-09 DIAGNOSIS — H92.01 OTALGIA, RIGHT EAR: ICD-10-CM

## 2024-08-09 ASSESSMENT — ENCOUNTER SYMPTOMS
ABDOMINAL PAIN: 0
VOICE CHANGE: 0
TROUBLE SWALLOWING: 0
SHORTNESS OF BREATH: 0
COLOR CHANGE: 0
EYES NEGATIVE: 1
GASTROINTESTINAL NEGATIVE: 1
RESPIRATORY NEGATIVE: 1
SORE THROAT: 0

## 2024-08-09 NOTE — PROGRESS NOTES
pain.   Genitourinary: Negative.    Musculoskeletal: Negative.    Skin: Negative.  Negative for color change.   Neurological: Negative.    Psychiatric/Behavioral: Negative.  Negative for behavioral problems and hallucinations.    All other systems reviewed and are negative.              Objective:     Vitals:    08/09/24 0913   BP: 134/89   Pulse: 61   SpO2: 95%     Physical Exam  Constitutional:       General: He is not in acute distress.     Appearance: He is not diaphoretic.   HENT:      Head: Normocephalic and atraumatic.      Right Ear: Tympanic membrane and external ear normal.      Left Ear: Tympanic membrane and external ear normal.      Mouth/Throat:      Mouth: Mucous membranes are pale.      Pharynx: No oropharyngeal exudate.   Eyes:      Conjunctiva/sclera: Conjunctivae normal.      Pupils: Pupils are equal, round, and reactive to light.   Pulmonary:      Effort: Pulmonary effort is normal. No respiratory distress.   Abdominal:      General: There is no distension.   Musculoskeletal:         General: Normal range of motion.      Cervical back: Normal range of motion and neck supple.   Lymphadenopathy:      Cervical: No cervical adenopathy.   Skin:     General: Skin is warm and dry.   Neurological:      Mental Status: He is alert and oriented to person, place, and time.                 Assessment:       Diagnosis Orders   1. Seasonal allergic rhinitis due to pollen        2. Otalgia, right ear                   Plan:      Allergic rhinitis  I do want to continue fluticasone (Flonase)   for now.    I will also order a CT scan of the Sinuses due to the continued symptoms of the patient despite medical therapy.  Call or return to clinic prn if these symptoms worsen or fail to improve as anticipated.    Follow up in 1 month(s)     RX given today:

## 2024-08-12 ENCOUNTER — TELEPHONE (OUTPATIENT)
Dept: ENT CLINIC | Age: 64
End: 2024-08-12

## 2024-08-12 NOTE — TELEPHONE ENCOUNTER
[facility] to authorize order with patient insurance. Patient is scheduled for CT sinus with radiology on 9/8/24 @ 10:00am. Patient has been notified of date and time and that they need to arrive at 9:30am. Patient was informed he has no prep prior to procedure. Patient instructed to park in [facility] parking lot and report to registration. Patient verbalized understanding.    Electronically signed by Hailee Ham MA on 8/12/24 at 8:59 AM EDT

## 2024-08-23 ENCOUNTER — HOSPITAL ENCOUNTER (OUTPATIENT)
Dept: CT IMAGING | Age: 64
End: 2024-08-23
Attending: OTOLARYNGOLOGY
Payer: MEDICARE

## 2024-08-23 DIAGNOSIS — J01.00 SUBACUTE MAXILLARY SINUSITIS: ICD-10-CM

## 2024-08-23 PROCEDURE — 70486 CT MAXILLOFACIAL W/O DYE: CPT

## 2024-08-29 ENCOUNTER — TELEPHONE (OUTPATIENT)
Dept: ENT CLINIC | Age: 64
End: 2024-08-29

## 2024-08-29 NOTE — TELEPHONE ENCOUNTER
Pt. Called regarding CT scans, would like to clarify if we got them. Pt. Would like a call back    Please advise    Electronically signed by Ja Major on 8/29/2024 at 8:35 AM

## 2024-09-24 ENCOUNTER — OFFICE VISIT (OUTPATIENT)
Dept: FAMILY MEDICINE CLINIC | Age: 64
End: 2024-09-24
Payer: MEDICARE

## 2024-09-24 VITALS
WEIGHT: 188 LBS | RESPIRATION RATE: 16 BRPM | OXYGEN SATURATION: 94 % | DIASTOLIC BLOOD PRESSURE: 81 MMHG | TEMPERATURE: 97.5 F | HEART RATE: 86 BPM | BODY MASS INDEX: 25.47 KG/M2 | SYSTOLIC BLOOD PRESSURE: 123 MMHG | HEIGHT: 72 IN

## 2024-09-24 DIAGNOSIS — Z00.00 WELCOME TO MEDICARE PREVENTIVE VISIT: Primary | ICD-10-CM

## 2024-09-24 DIAGNOSIS — R73.02 IMPAIRED GLUCOSE TOLERANCE: ICD-10-CM

## 2024-09-24 DIAGNOSIS — I10 BENIGN ESSENTIAL HYPERTENSION: ICD-10-CM

## 2024-09-24 PROBLEM — J84.9 INTERSTITIAL LUNG DISEASE (HCC): Status: RESOLVED | Noted: 2024-01-02 | Resolved: 2024-09-24

## 2024-09-24 PROBLEM — J43.8 PARASEPTAL EMPHYSEMA (HCC): Status: RESOLVED | Noted: 2023-12-27 | Resolved: 2024-09-24

## 2024-09-24 PROBLEM — J84.10 PULMONARY FIBROSIS (HCC): Status: RESOLVED | Noted: 2024-01-07 | Resolved: 2024-09-24

## 2024-09-24 LAB
BASOPHILS ABSOLUTE: 0.01 K/UL (ref 0–0.2)
BASOPHILS RELATIVE PERCENT: 0 % (ref 0–2)
EOSINOPHILS ABSOLUTE: 0.22 K/UL (ref 0.05–0.5)
EOSINOPHILS RELATIVE PERCENT: 2 % (ref 0–6)
HCT VFR BLD CALC: 46.1 % (ref 37–54)
HEMOGLOBIN: 15 G/DL (ref 12.5–16.5)
IMMATURE GRANULOCYTES %: 0 % (ref 0–5)
IMMATURE GRANULOCYTES ABSOLUTE: <0.03 K/UL (ref 0–0.58)
LYMPHOCYTES ABSOLUTE: 2.29 K/UL (ref 1.5–4)
LYMPHOCYTES RELATIVE PERCENT: 24 % (ref 20–42)
MCH RBC QN AUTO: 31.4 PG (ref 26–35)
MCHC RBC AUTO-ENTMCNC: 32.5 G/DL (ref 32–34.5)
MCV RBC AUTO: 96.6 FL (ref 80–99.9)
MONOCYTES ABSOLUTE: 0.51 K/UL (ref 0.1–0.95)
MONOCYTES RELATIVE PERCENT: 5 % (ref 2–12)
NEUTROPHILS ABSOLUTE: 6.48 K/UL (ref 1.8–7.3)
NEUTROPHILS RELATIVE PERCENT: 68 % (ref 43–80)
PDW BLD-RTO: 13.5 % (ref 11.5–15)
PLATELET # BLD: 297 K/UL (ref 130–450)
PMV BLD AUTO: 9.1 FL (ref 7–12)
RBC # BLD: 4.77 M/UL (ref 3.8–5.8)
WBC # BLD: 9.5 K/UL (ref 4.5–11.5)

## 2024-09-24 PROCEDURE — 3074F SYST BP LT 130 MM HG: CPT | Performed by: FAMILY MEDICINE

## 2024-09-24 PROCEDURE — 3079F DIAST BP 80-89 MM HG: CPT | Performed by: FAMILY MEDICINE

## 2024-09-24 PROCEDURE — G0402 INITIAL PREVENTIVE EXAM: HCPCS | Performed by: FAMILY MEDICINE

## 2024-09-24 PROCEDURE — 1123F ACP DISCUSS/DSCN MKR DOCD: CPT | Performed by: FAMILY MEDICINE

## 2024-09-24 PROCEDURE — 36415 COLL VENOUS BLD VENIPUNCTURE: CPT | Performed by: FAMILY MEDICINE

## 2024-09-24 ASSESSMENT — PATIENT HEALTH QUESTIONNAIRE - PHQ9
SUM OF ALL RESPONSES TO PHQ QUESTIONS 1-9: 0
SUM OF ALL RESPONSES TO PHQ9 QUESTIONS 1 & 2: 0
1. LITTLE INTEREST OR PLEASURE IN DOING THINGS: NOT AT ALL
SUM OF ALL RESPONSES TO PHQ QUESTIONS 1-9: 0
SUM OF ALL RESPONSES TO PHQ QUESTIONS 1-9: 0
2. FEELING DOWN, DEPRESSED OR HOPELESS: NOT AT ALL
SUM OF ALL RESPONSES TO PHQ QUESTIONS 1-9: 0

## 2024-09-24 ASSESSMENT — LIFESTYLE VARIABLES
HOW MANY STANDARD DRINKS CONTAINING ALCOHOL DO YOU HAVE ON A TYPICAL DAY: 3 OR 4
HOW OFTEN DO YOU HAVE A DRINK CONTAINING ALCOHOL: MONTHLY OR LESS

## 2024-09-25 PROBLEM — J41.1 CHRONIC BRONCHITIS WITH PRODUCTIVE MUCOPURULENT COUGH (HCC): Status: RESOLVED | Noted: 2021-08-13 | Resolved: 2024-09-25

## 2024-09-25 LAB
ALBUMIN: 4.4 G/DL (ref 3.5–5.2)
ALP BLD-CCNC: 59 U/L (ref 40–129)
ALT SERPL-CCNC: 11 U/L (ref 0–40)
ANION GAP SERPL CALCULATED.3IONS-SCNC: 16 MMOL/L (ref 7–16)
AST SERPL-CCNC: 17 U/L (ref 0–39)
BILIRUB SERPL-MCNC: 0.5 MG/DL (ref 0–1.2)
BUN BLDV-MCNC: 10 MG/DL (ref 6–23)
CALCIUM SERPL-MCNC: 9.5 MG/DL (ref 8.6–10.2)
CHLORIDE BLD-SCNC: 103 MMOL/L (ref 98–107)
CHOLESTEROL, TOTAL: 208 MG/DL
CO2: 21 MMOL/L (ref 22–29)
CREAT SERPL-MCNC: 0.9 MG/DL (ref 0.7–1.2)
GFR, ESTIMATED: >90 ML/MIN/1.73M2
GLUCOSE BLD-MCNC: 83 MG/DL (ref 74–99)
HBA1C MFR BLD: 5.8 % (ref 4–5.6)
HDLC SERPL-MCNC: 48 MG/DL
LDL CHOLESTEROL: 139 MG/DL
POTASSIUM SERPL-SCNC: 4.3 MMOL/L (ref 3.5–5)
SODIUM BLD-SCNC: 140 MMOL/L (ref 132–146)
TOTAL PROTEIN: 7.6 G/DL (ref 6.4–8.3)
TRIGL SERPL-MCNC: 103 MG/DL
VLDLC SERPL CALC-MCNC: 21 MG/DL

## 2024-09-27 ENCOUNTER — OFFICE VISIT (OUTPATIENT)
Dept: ENT CLINIC | Age: 64
End: 2024-09-27
Payer: MEDICARE

## 2024-09-27 VITALS
SYSTOLIC BLOOD PRESSURE: 136 MMHG | OXYGEN SATURATION: 98 % | WEIGHT: 188 LBS | BODY MASS INDEX: 25.47 KG/M2 | DIASTOLIC BLOOD PRESSURE: 83 MMHG | HEIGHT: 72 IN | HEART RATE: 60 BPM

## 2024-09-27 DIAGNOSIS — J30.1 SEASONAL ALLERGIC RHINITIS DUE TO POLLEN: Primary | ICD-10-CM

## 2024-09-27 DIAGNOSIS — H92.01 OTALGIA, RIGHT EAR: ICD-10-CM

## 2024-09-27 PROCEDURE — 3079F DIAST BP 80-89 MM HG: CPT | Performed by: OTOLARYNGOLOGY

## 2024-09-27 PROCEDURE — 1123F ACP DISCUSS/DSCN MKR DOCD: CPT | Performed by: OTOLARYNGOLOGY

## 2024-09-27 PROCEDURE — 99214 OFFICE O/P EST MOD 30 MIN: CPT | Performed by: OTOLARYNGOLOGY

## 2024-09-27 PROCEDURE — 3075F SYST BP GE 130 - 139MM HG: CPT | Performed by: OTOLARYNGOLOGY

## 2024-09-27 NOTE — PROGRESS NOTES
Subjective:      Patient ID:  Zachary Mcgraw is a 64 y.o. male.    HPI:    Pt returns for review of CT Sinus.  There are not changes since last visit. Pt is doing better    Pt has been on fluticasone (Flonase) for 1 month(s) and is doing well      Past Medical History:   Diagnosis Date    Afib (HCC)     Anxiety     Atrial fibrillation (HCC) 11/13/2011    S/P ablation       Caffeine abuse (HCC)     12 cups per day    Cardiomyopathy (HCC)     [place]av[place], now back to normal    Chronic bilateral low back pain without sciatica 12/04/2019    Chronic bronchitis (HCC)     Chronic bronchitis with productive mucopurulent cough (HCC) 08/13/2021    Pt with chronic AM cough upon wakening.    No wheezing.  Not on meds      Chronic rhinitis     exposed to dust    COPD (chronic obstructive pulmonary disease) (HCC) 05/20/2015    Mild per PFT's 2014 [facility]    COVID-19 11/23/2021    Diagnosis added to problem list by Kevin Musa based on transcribed order from Dr. Perdomo.     History of alcohol abuse     Hyperlipidemia     Hypertension     IGT (impaired glucose tolerance) 11/18/2015    Interstitial lung disease (HCC) 01/02/2024    Left upper lobe consolidation (HCC) 12/31/2023    Left upper lobe pulmonary infiltrate 01/02/2024    Other primary cardiomyopathies 10/26/2011    Non-ischemic Cardiomyopathy Sees cardio - last visit: unknown. Miguel.      Paraseptal emphysema (HCC) 12/27/2023    Pneumonia due to Haemophilus influenzae (HCC) 01/07/2024    Pneumonia due to infectious organism 12/17/2023    Pulmonary fibrosis (HCC) 01/07/2024    Right shoulder pain     impingement, [place]    S/P ablation of atrial fibrillation 12/06/2011    ablation at [facility]    Spondylosis of cervical joint     Tobacco abuse      Past Surgical History:   Procedure Laterality Date    COLONOSCOPY  08, 09, 2012 [place]    2 adenomatous polyps    ECHO COMPL W DOP COLOR FLOW  11/15/2011         HERNIA REPAIR      RI BRNCHSC INCL FLUOR GDNCE DX W/CELL WASHG SPX N/A

## 2024-10-26 DIAGNOSIS — I48.91 ATRIAL FIBRILLATION, UNSPECIFIED TYPE (HCC): Chronic | ICD-10-CM

## 2024-10-26 DIAGNOSIS — I10 BENIGN ESSENTIAL HYPERTENSION: ICD-10-CM

## 2024-10-28 NOTE — TELEPHONE ENCOUNTER
Name of Medication(s) Requested:  Requested Prescriptions     Pending Prescriptions Disp Refills    carvedilol (COREG) 12.5 MG tablet [Pharmacy Med Name: CARVEDILOL 12.5 MG TABLET] 180 tablet 0     Sig: TAKE 1 TABLET BY MOUTH TWICE A DAY       Medication is on current medication list Yes    Dosage and directions were verified? Yes    Quantity verified: 90 day supply     Pharmacy Verified?  Yes    Last Appointment:  9/24/2024    Future appts:  Future Appointments   Date Time Provider Department Center   12/10/2024  1:00 PM Swapnil Boudreaux DO [place] [facility]   10/7/2025  1:00 PM Kianna Varela MD [facility]        (If no appt send self scheduling link. .REFILLAPPT)  Scheduling request sent?     [] Yes  [x] No    Does patient need updated?  [] Yes  [x] No

## 2024-10-29 RX ORDER — CARVEDILOL 12.5 MG/1
12.5 TABLET ORAL 2 TIMES DAILY
Qty: 180 TABLET | Refills: 3 | Status: SHIPPED | OUTPATIENT
Start: 2024-10-29

## 2024-12-02 ENCOUNTER — OFFICE VISIT (OUTPATIENT)
Dept: FAMILY MEDICINE CLINIC | Age: 64
End: 2024-12-02

## 2024-12-02 VITALS
WEIGHT: 190 LBS | HEIGHT: 72 IN | TEMPERATURE: 97.1 F | HEART RATE: 68 BPM | SYSTOLIC BLOOD PRESSURE: 124 MMHG | DIASTOLIC BLOOD PRESSURE: 89 MMHG | OXYGEN SATURATION: 98 % | RESPIRATION RATE: 18 BRPM | BODY MASS INDEX: 25.73 KG/M2

## 2024-12-02 DIAGNOSIS — J06.9 VIRAL URI: Primary | ICD-10-CM

## 2024-12-02 RX ORDER — BENZONATATE 200 MG/1
200 CAPSULE ORAL 3 TIMES DAILY PRN
Qty: 30 CAPSULE | Refills: 0 | Status: SHIPPED | OUTPATIENT
Start: 2024-12-02 | End: 2024-12-12

## 2024-12-02 RX ORDER — GUAIFENESIN 600 MG/1
600 TABLET, EXTENDED RELEASE ORAL 2 TIMES DAILY
Qty: 30 TABLET | Refills: 0 | Status: SHIPPED | OUTPATIENT
Start: 2024-12-02 | End: 2024-12-17

## 2024-12-02 RX ORDER — FLUTICASONE PROPIONATE 50 MCG
1 SPRAY, SUSPENSION (ML) NASAL DAILY
Qty: 32 G | Refills: 1 | Status: CANCELLED | OUTPATIENT
Start: 2024-12-02

## 2024-12-02 NOTE — PROGRESS NOTES
S: 64 y.o. male with   Chief Complaint   Patient presents with    Cough     Onset 5-6 days, cough, chest congestion, yellow/green sputum. Denies nausea/vomiting. States he was afebrile at home for entire duration. Negative home covid test.       Cough  -new issue  -for 5-6 days  -productive   -denies any fever    O: VS:  height is 1.829 m (6') and weight is 86.2 kg (190 lb). His temperature is 97.1 °F (36.2 °C). His blood pressure is 124/89 and his pulse is 68. His respiration is 18 and oxygen saturation is 98%.   BP Readings from Last 3 Encounters:   12/02/24 124/89   09/27/24 136/83   09/24/24 123/81     See resident note    Impression/Plan:   1) Viral uri - supportive care       Health Maintenance Due   Topic Date Due    HIV screen  Never done    Hepatitis C screen  Never done    Shingles vaccine (1 of 2) Never done    Pneumococcal 65+ years Vaccine (2 of 2 - PCV) 05/20/2016    Respiratory Syncytial Virus (RSV) Pregnant or age 60 yrs+ (1 - Risk 60-74 years 1-dose series) Never done    Flu vaccine (1) Never done    COVID-19 Vaccine (1 - 2023-24 season) Never done         Attending Physician Statement  I have discussed the case, including pertinent history and exam findings with the resident.  I agree with the documented assessment and plan.      Alexis Conde, DO

## 2024-12-02 NOTE — PROGRESS NOTES
HPI:   Jonn Sadler is a 38year old female who presents for a complete physical exam. Symptoms: denies discharge, itching, burning or dysuria, periods are regular. Patient complains of stress and depression.  Has some spotting the 2nd Monday after perio [facility]  Department of Family Medicine  Family Medicine Residency Program      Patient: Zachary Mcgraw 64 y.o. male     Date of Service: 12/2/24      Chief complaint:   Chief Complaint   Patient presents with    Cough     Onset 5-6 days, cough, chest congestion, yellow/green sputum. Denies nausea/vomiting. States he was afebrile at home for entire duration. Negative home covid test.       HISTORY OF PRESENTING ILLNESS     64 y.o. male presented to the clinic with complaints of cough.    URI  Symptoms started 5-6 days ago   congestion, sinus pressure, productive cough, bilateral ear pain with right being worse than left  Eating and drinking normally  No fevers or chills   Has tried tylenol but nothing else           Health Maintenance:  Health Maintenance Due   Topic Date Due    HIV screen  Never done    Hepatitis C screen  Never done    Shingles vaccine (1 of 2) Never done    Pneumococcal 65+ years Vaccine (2 of 2 - PCV) 05/20/2016    Respiratory Syncytial Virus (RSV) Pregnant or age 60 yrs+ (1 - Risk 60-74 years 1-dose series) Never done    Flu vaccine (1) Never done    COVID-19 Vaccine (1 - 2023-24 season) Never done     Past Medical History:      Diagnosis Date    Afib (HCC)     Anxiety     Atrial fibrillation (HCC) 11/13/2011    S/P ablation       Caffeine abuse (HCC)     12 cups per day    Cardiomyopathy (HCC)     Scavina, now back to normal    Chronic bilateral low back pain without sciatica 12/04/2019    Chronic bronchitis (HCC)     Chronic bronchitis with productive mucopurulent cough (HCC) 08/13/2021    Pt with chronic AM cough upon wakening.    No wheezing.  Not on meds      Chronic rhinitis     exposed to dust    COPD (chronic obstructive pulmonary disease) (HCC) 05/20/2015    Mild per PFT's 2014 [facility]    COVID-19 11/23/2021    Diagnosis added to problem list by Kevin Musa based on transcribed order from Dr. Perdomo.     History of alcohol abuse     Hyperlipidemia      Grandmother    • Other [OTHER] Maternal Grandmother      Pick's disease, dementia   • Heart Disorder Paternal Grandfather    • Psychiatric Sister      depression   • Other Rm José Luis Sister    • Psychiatric Paternal Uncle      depression       Social History: tenderness  : deferred   MUSCULOSKELETAL: back is not tender,FROM of the back  EXTREMITIES: no cyanosis, clubbing or edema  NEURO: Oriented times three,cranial nerves are intact,motor and sensory are grossly intact    ASSESSMENT AND PLAN:   Fareed Wilhelm

## 2024-12-10 ENCOUNTER — OFFICE VISIT (OUTPATIENT)
Dept: ENT CLINIC | Age: 64
End: 2024-12-10
Payer: MEDICARE

## 2024-12-10 ENCOUNTER — PROCEDURE VISIT (OUTPATIENT)
Dept: AUDIOLOGY | Age: 64
End: 2024-12-10

## 2024-12-10 VITALS
WEIGHT: 188 LBS | BODY MASS INDEX: 25.47 KG/M2 | TEMPERATURE: 98.1 F | DIASTOLIC BLOOD PRESSURE: 80 MMHG | SYSTOLIC BLOOD PRESSURE: 132 MMHG | RESPIRATION RATE: 18 BRPM | OXYGEN SATURATION: 98 % | HEIGHT: 72 IN | HEART RATE: 68 BPM

## 2024-12-10 DIAGNOSIS — H69.93 DYSFUNCTION OF BOTH EUSTACHIAN TUBES: Primary | ICD-10-CM

## 2024-12-10 DIAGNOSIS — H92.01 OTALGIA, RIGHT EAR: Primary | ICD-10-CM

## 2024-12-10 DIAGNOSIS — J30.1 SEASONAL ALLERGIC RHINITIS DUE TO POLLEN: ICD-10-CM

## 2024-12-10 DIAGNOSIS — C32.0 CANCER OF GLOTTIS (HCC): ICD-10-CM

## 2024-12-10 PROCEDURE — 1123F ACP DISCUSS/DSCN MKR DOCD: CPT | Performed by: OTOLARYNGOLOGY

## 2024-12-10 PROCEDURE — 31575 DIAGNOSTIC LARYNGOSCOPY: CPT | Performed by: OTOLARYNGOLOGY

## 2024-12-10 PROCEDURE — 3079F DIAST BP 80-89 MM HG: CPT | Performed by: OTOLARYNGOLOGY

## 2024-12-10 PROCEDURE — 3075F SYST BP GE 130 - 139MM HG: CPT | Performed by: OTOLARYNGOLOGY

## 2024-12-10 PROCEDURE — 99213 OFFICE O/P EST LOW 20 MIN: CPT | Performed by: OTOLARYNGOLOGY

## 2024-12-10 NOTE — PROGRESS NOTES
Subjective:      Patient ID:  Zachary Mcgraw is a 64 y.o. male.    HPI:    Pt returns for routine check of sinus and ear complaints. Currently feels ear is clogged on right. Intermittently gets clogged. Using medicated nasal sprays OTC.    Hx of glottic SCC s/p xrt over 5 years ago.       Past Medical History:   Diagnosis Date    Afib (HCC)     Anxiety     Atrial fibrillation (HCC) 11/13/2011    S/P ablation       Caffeine abuse (HCC)     12 cups per day    Cardiomyopathy (HCC)     Scavina, now back to normal    Chronic bilateral low back pain without sciatica 12/04/2019    Chronic bronchitis (HCC)     Chronic bronchitis with productive mucopurulent cough (HCC) 08/13/2021    Pt with chronic AM cough upon wakening.    No wheezing.  Not on meds      Chronic rhinitis     exposed to dust    COPD (chronic obstructive pulmonary disease) ([facility]) 05/20/2015    Mild per PFT's 2014 [facility]    COVID-19 11/23/2021    Diagnosis added to problem list by Kevin Musa based on transcribed order from Dr. Perdomo.     History of alcohol abuse     Hyperlipidemia     Hypertension     IGT (impaired glucose tolerance) 11/18/2015    Interstitial lung disease (HCC) 01/02/2024    Left upper lobe consolidation (HCC) 12/31/2023    Left upper lobe pulmonary infiltrate 01/02/2024    Other primary cardiomyopathies 10/26/2011    Non-ischemic Cardiomyopathy Sees cardio - last visit: unknown. Miguel.      Paraseptal emphysema ([facility]) 12/27/2023    Pneumonia due to Haemophilus influenzae (HCC) 01/07/2024    Pneumonia due to infectious organism 12/17/2023    Pulmonary fibrosis ([facility]) 01/07/2024    Right shoulder pain     impingement, Duffet    S/P ablation of atrial fibrillation 12/06/2011    ablation at [facility]    Spondylosis of cervical joint     Tobacco abuse      Past Surgical History:   Procedure Laterality Date    COLONOSCOPY  08, 09, 2012 [facility]    2 adenomatous polyps    ECHO COMPL W DOP COLOR FLOW  11/15/2011         HERNIA REPAIR      DE

## 2024-12-13 NOTE — PROGRESS NOTES
This patient was referred for tympanometric testing by Dr. Boudreaux due to eustachian tube dysfunction.     Tympanometry revealed flat tympanograms, bilaterally.    The results were reviewed with the patient and ordering provider.     Recommendations for follow up will be made pending ordering provider consult.    Rony Abbott/CCC-AL  [place] Lic [id number]  Electronically signed by Rony Abbott on 12/13/2024 at 11:16 AM

## 2025-01-30 ENCOUNTER — HOSPITAL ENCOUNTER (EMERGENCY)
Age: 65
Discharge: HOME | End: 2025-01-31
Attending: STUDENT IN AN ORGANIZED HEALTH CARE EDUCATION/TRAINING PROGRAM
Payer: MEDICARE

## 2025-01-30 ENCOUNTER — APPOINTMENT (OUTPATIENT)
Dept: CT IMAGING | Age: 65
End: 2025-01-30
Payer: MEDICARE

## 2025-01-30 DIAGNOSIS — E86.0 DEHYDRATION: ICD-10-CM

## 2025-01-30 DIAGNOSIS — R10.84 GENERALIZED ABDOMINAL PAIN: Primary | ICD-10-CM

## 2025-01-30 DIAGNOSIS — K52.9 ENTERITIS: ICD-10-CM

## 2025-01-30 LAB
ANION GAP SERPL CALCULATED.3IONS-SCNC: 8 MMOL/L (ref 7–16)
BASOPHILS # BLD: 0.02 K/UL (ref 0–0.2)
BASOPHILS NFR BLD: 0 % (ref 0–2)
BUN SERPL-MCNC: 15 MG/DL (ref 6–23)
CALCIUM SERPL-MCNC: 8.5 MG/DL (ref 8.6–10.2)
CHLORIDE SERPL-SCNC: 104 MMOL/L (ref 98–107)
CO2 SERPL-SCNC: 27 MMOL/L (ref 22–29)
CREAT SERPL-MCNC: 1.3 MG/DL (ref 0.7–1.2)
EOSINOPHIL # BLD: 0.15 K/UL (ref 0.05–0.5)
EOSINOPHILS RELATIVE PERCENT: 2 % (ref 0–6)
ERYTHROCYTE [DISTWIDTH] IN BLOOD BY AUTOMATED COUNT: 13 % (ref 11.5–15)
GFR, ESTIMATED: 63 ML/MIN/1.73M2
GLUCOSE SERPL-MCNC: 105 MG/DL (ref 74–99)
HCT VFR BLD AUTO: 39.1 % (ref 37–54)
HGB BLD-MCNC: 13.1 G/DL (ref 12.5–16.5)
IMM GRANULOCYTES # BLD AUTO: 0.04 K/UL (ref 0–0.58)
IMM GRANULOCYTES NFR BLD: 0 % (ref 0–5)
INR PPP: 1.1
LACTATE BLDV-SCNC: 1.3 MMOL/L (ref 0.5–2.2)
LIPASE SERPL-CCNC: 36 U/L (ref 13–60)
LYMPHOCYTES NFR BLD: 2.46 K/UL (ref 1.5–4)
LYMPHOCYTES RELATIVE PERCENT: 27 % (ref 20–42)
MAGNESIUM SERPL-MCNC: 1.9 MG/DL (ref 1.6–2.6)
MCH RBC QN AUTO: 31.9 PG (ref 26–35)
MCHC RBC AUTO-ENTMCNC: 33.5 G/DL (ref 32–34.5)
MCV RBC AUTO: 95.1 FL (ref 80–99.9)
MONOCYTES NFR BLD: 0.66 K/UL (ref 0.1–0.95)
MONOCYTES NFR BLD: 7 % (ref 2–12)
NEUTROPHILS NFR BLD: 64 % (ref 43–80)
NEUTS SEG NFR BLD: 5.82 K/UL (ref 1.8–7.3)
PLATELET # BLD AUTO: 253 K/UL (ref 130–450)
PMV BLD AUTO: 9.1 FL (ref 7–12)
POTASSIUM SERPL-SCNC: 3.7 MMOL/L (ref 3.5–5)
PROTHROMBIN TIME: 12 SEC (ref 9.3–12.4)
RBC # BLD AUTO: 4.11 M/UL (ref 3.8–5.8)
SODIUM SERPL-SCNC: 139 MMOL/L (ref 132–146)
TROPONIN I SERPL HS-MCNC: <6 NG/L (ref 0–11)
WBC OTHER # BLD: 9.2 K/UL (ref 4.5–11.5)

## 2025-01-30 PROCEDURE — 82248 BILIRUBIN DIRECT: CPT

## 2025-01-30 PROCEDURE — 2580000003 HC RX 258: Performed by: STUDENT IN AN ORGANIZED HEALTH CARE EDUCATION/TRAINING PROGRAM

## 2025-01-30 PROCEDURE — 74177 CT ABD & PELVIS W/CONTRAST: CPT

## 2025-01-30 PROCEDURE — 96374 THER/PROPH/DIAG INJ IV PUSH: CPT

## 2025-01-30 PROCEDURE — 96361 HYDRATE IV INFUSION ADD-ON: CPT

## 2025-01-30 PROCEDURE — 83735 ASSAY OF MAGNESIUM: CPT

## 2025-01-30 PROCEDURE — 83605 ASSAY OF LACTIC ACID: CPT

## 2025-01-30 PROCEDURE — 85610 PROTHROMBIN TIME: CPT

## 2025-01-30 PROCEDURE — 6360000002 HC RX W HCPCS: Performed by: STUDENT IN AN ORGANIZED HEALTH CARE EDUCATION/TRAINING PROGRAM

## 2025-01-30 PROCEDURE — 99285 EMERGENCY DEPT VISIT HI MDM: CPT

## 2025-01-30 PROCEDURE — 93005 ELECTROCARDIOGRAM TRACING: CPT | Performed by: STUDENT IN AN ORGANIZED HEALTH CARE EDUCATION/TRAINING PROGRAM

## 2025-01-30 PROCEDURE — 84484 ASSAY OF TROPONIN QUANT: CPT

## 2025-01-30 PROCEDURE — 85025 COMPLETE CBC W/AUTO DIFF WBC: CPT

## 2025-01-30 PROCEDURE — 2500000003 HC RX 250 WO HCPCS: Performed by: STUDENT IN AN ORGANIZED HEALTH CARE EDUCATION/TRAINING PROGRAM

## 2025-01-30 PROCEDURE — 96375 TX/PRO/DX INJ NEW DRUG ADDON: CPT

## 2025-01-30 PROCEDURE — 80053 COMPREHEN METABOLIC PANEL: CPT

## 2025-01-30 PROCEDURE — 83690 ASSAY OF LIPASE: CPT

## 2025-01-30 RX ORDER — 0.9 % SODIUM CHLORIDE 0.9 %
1000 INTRAVENOUS SOLUTION INTRAVENOUS ONCE
Status: COMPLETED | OUTPATIENT
Start: 2025-01-30 | End: 2025-01-31

## 2025-01-30 RX ORDER — ONDANSETRON 2 MG/ML
4 INJECTION INTRAMUSCULAR; INTRAVENOUS ONCE
Status: COMPLETED | OUTPATIENT
Start: 2025-01-30 | End: 2025-01-30

## 2025-01-30 RX ORDER — FENTANYL CITRATE 50 UG/ML
50 INJECTION, SOLUTION INTRAMUSCULAR; INTRAVENOUS ONCE
Status: DISCONTINUED | OUTPATIENT
Start: 2025-01-30 | End: 2025-01-30

## 2025-01-30 RX ORDER — IOPAMIDOL 755 MG/ML
75 INJECTION, SOLUTION INTRAVASCULAR
Status: COMPLETED | OUTPATIENT
Start: 2025-01-30 | End: 2025-01-31

## 2025-01-30 RX ADMIN — HYDROMORPHONE HYDROCHLORIDE 0.5 MG: 1 INJECTION, SOLUTION INTRAMUSCULAR; INTRAVENOUS; SUBCUTANEOUS at 22:23

## 2025-01-30 RX ADMIN — ONDANSETRON 4 MG: 2 INJECTION, SOLUTION INTRAMUSCULAR; INTRAVENOUS at 22:13

## 2025-01-30 RX ADMIN — FAMOTIDINE 20 MG: 10 INJECTION, SOLUTION INTRAVENOUS at 22:22

## 2025-01-30 RX ADMIN — SODIUM CHLORIDE 1000 ML: 9 INJECTION, SOLUTION INTRAVENOUS at 22:16

## 2025-01-30 ASSESSMENT — PAIN DESCRIPTION - DESCRIPTORS: DESCRIPTORS: SHARP

## 2025-01-30 ASSESSMENT — PAIN SCALES - GENERAL: PAINLEVEL_OUTOF10: 10

## 2025-01-30 ASSESSMENT — PAIN DESCRIPTION - LOCATION: LOCATION: ABDOMEN

## 2025-01-30 ASSESSMENT — LIFESTYLE VARIABLES
HOW OFTEN DO YOU HAVE A DRINK CONTAINING ALCOHOL: NEVER
HOW MANY STANDARD DRINKS CONTAINING ALCOHOL DO YOU HAVE ON A TYPICAL DAY: PATIENT DOES NOT DRINK

## 2025-01-31 VITALS
HEART RATE: 78 BPM | SYSTOLIC BLOOD PRESSURE: 119 MMHG | RESPIRATION RATE: 15 BRPM | OXYGEN SATURATION: 92 % | TEMPERATURE: 97.8 F | DIASTOLIC BLOOD PRESSURE: 84 MMHG

## 2025-01-31 LAB
ALBUMIN SERPL-MCNC: 3.8 G/DL (ref 3.5–5.2)
ALP SERPL-CCNC: 46 U/L (ref 40–129)
ALT SERPL-CCNC: 13 U/L (ref 0–40)
AST SERPL-CCNC: 14 U/L (ref 0–39)
BILIRUB DIRECT SERPL-MCNC: <0.2 MG/DL (ref 0–0.3)
BILIRUB INDIRECT SERPL-MCNC: NORMAL MG/DL (ref 0–1)
BILIRUB SERPL-MCNC: 0.3 MG/DL (ref 0–1.2)
EKG ATRIAL RATE: 64 BPM
EKG P AXIS: 67 DEGREES
EKG P-R INTERVAL: 166 MS
EKG Q-T INTERVAL: 456 MS
EKG QRS DURATION: 118 MS
EKG QTC CALCULATION (BAZETT): 470 MS
EKG R AXIS: 49 DEGREES
EKG T AXIS: 64 DEGREES
EKG VENTRICULAR RATE: 64 BPM
PROT SERPL-MCNC: 6.5 G/DL (ref 6.4–8.3)

## 2025-01-31 PROCEDURE — 93010 ELECTROCARDIOGRAM REPORT: CPT | Performed by: INTERNAL MEDICINE

## 2025-01-31 PROCEDURE — 6360000004 HC RX CONTRAST MEDICATION: Performed by: RADIOLOGY

## 2025-01-31 RX ADMIN — IOPAMIDOL 75 ML: 755 INJECTION, SOLUTION INTRAVENOUS at 00:01

## 2025-01-31 ASSESSMENT — PAIN SCALES - GENERAL: PAINLEVEL_OUTOF10: 0

## 2025-01-31 NOTE — ED PROVIDER NOTES
[facility] EMERGENCY DEPARTMENT  EMERGENCY DEPARTMENT ENCOUNTER        Pt Name: Zachary Mcgraw  MRN: [id number]  Birthdate 1960  Date of evaluation: 1/30/2025  Provider: Celeste Villalobos MD  PCP: Kianna Varela MD  Note Started: 10:10 PM EST 1/30/25    CHIEF COMPLAINT       Chief Complaint   Patient presents with    Abdominal Pain     Started tonight       HISTORY OF PRESENT ILLNESS: 1 or more Elements   History From: Patient    Limitations to history : None    Zachary Mcgraw is a 64 y.o. male who presents for sudden onset abdominal pain.  The patient states he was watching his grandson play a sporting event when his pain started about 3 hours prior to arrival.  He states he felt well and was of normal health earlier today.  He has not vomited but just the contents he ate today.  No coffee-ground or bloody emesis noted.  He has had no black or bloody stools.  He has had normal bowel movements and urination today.  Denies chest pain, shortness of breath, fevers.  He has had prior hernia repair in the past    Nursing Notes were all reviewed and agreed with or any disagreements were addressed in the HPI.        REVIEW OF EXTERNAL NOTE :       Patient was last seen in office by ENT on 12/10/2024 for right ear otalgia, cancer of glottis, seasonal allergic rhinitis due to pollen    REVIEW OF SYSTEMS :           Positives and Pertinent negatives as per HPI.     SURGICAL HISTORY     Past Surgical History:   Procedure Laterality Date    COLONOSCOPY  08, 09, 2012 [facility]    2 adenomatous polyps    ECHO COMPL W DOP COLOR FLOW  11/15/2011         HERNIA REPAIR      [facility] INCL FLUOR GDNCE DX W/CELL WASHG SPX N/A 9/28/2018    PANDOSCOPY WITH BIOPSY LEFT TRUE VOCAL CORD WITH FROZEN SECTION ++PATHOLOGY NEEDED++ performed by Swapnil Boudreaux DO at [facility] OR    MA ESOPHAGOSCOPY FLEXIBLE TRANSORAL DIAGNOSTIC N/A 9/28/2018    ESOPHAGOSCOPY RIGID performed by Swapnil Boudreaux DO

## 2025-01-31 NOTE — DISCHARGE INSTRUCTIONS
Please stay hydrated with fluids and follow up with your PCP. Please return to the emergency department if symptoms worsen.

## 2025-01-31 NOTE — ED NOTES
Radiology Procedure Waiver   Name: Zachary Mcgraw  : 1960  MRN: [id number]    Date:  25    Time: 10:09 PM EST    Benefits of immediately proceeding with Radiology exam(s) without pre-testing outweigh the risks or are not indicated as specified below and therefore the following is/are being waived:    [] Pregnancy test   [] Patients LMP on-time and regular.   [] Patient had Tubal Ligation or has other Contraception Device.   [] Patient  is Menopausal or Premenarcheal.    [] Patient had Full or Partial Hysterectomy.    [] Protocol for Iodine allergy    [] MRI Questionnaire     [x] BUN/Creatinine   [] Patient age w/no hx of renal dysfunction.   [] Patient on Dialysis.   [x] Recent Normal Labs.  Electronically signed by Celeste Villalobos MD on 25 at 10:09 PM EST

## 2025-02-03 ENCOUNTER — OFFICE VISIT (OUTPATIENT)
Dept: FAMILY MEDICINE CLINIC | Age: 65
End: 2025-02-03

## 2025-02-03 VITALS
OXYGEN SATURATION: 98 % | RESPIRATION RATE: 16 BRPM | HEART RATE: 64 BPM | BODY MASS INDEX: 25.98 KG/M2 | WEIGHT: 196 LBS | DIASTOLIC BLOOD PRESSURE: 84 MMHG | SYSTOLIC BLOOD PRESSURE: 120 MMHG | TEMPERATURE: 97.9 F | HEIGHT: 73 IN

## 2025-02-03 DIAGNOSIS — A08.4 VIRAL GASTROENTERITIS: Primary | ICD-10-CM

## 2025-02-03 DIAGNOSIS — Z09 FOLLOW-UP EXAMINATION: ICD-10-CM

## 2025-02-03 ASSESSMENT — PATIENT HEALTH QUESTIONNAIRE - PHQ9
SUM OF ALL RESPONSES TO PHQ9 QUESTIONS 1 & 2: 0
SUM OF ALL RESPONSES TO PHQ QUESTIONS 1-9: 0
1. LITTLE INTEREST OR PLEASURE IN DOING THINGS: NOT AT ALL
SUM OF ALL RESPONSES TO PHQ QUESTIONS 1-9: 0
SUM OF ALL RESPONSES TO PHQ QUESTIONS 1-9: 0
2. FEELING DOWN, DEPRESSED OR HOPELESS: NOT AT ALL
SUM OF ALL RESPONSES TO PHQ QUESTIONS 1-9: 0

## 2025-02-03 NOTE — PROGRESS NOTES
[facility]  Family Medicine Residency  Phone: [phone]  Fax: [phone]    Patient:  Zachary Mcgraw 64 y.o. male                                 Date of Service: 2/3/25                            Chiefcomplaint:   Chief Complaint   Patient presents with    ED Follow-up     1/30 Abdominal Pain/Enteritis (resolved)         History of Present Illness:     The patient is a 64 y.o. male  presented to the clinic with complaints as above.    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   Denies any fever, chills, n/v, headache, dizziness, vision changes, neck tenderness or stiffness, weakness, cp, palpitations, leg swelling/tenderness, sob, cough, abd pain, dysuria, hematuria, diarrhea, constipation, bloody stools.        Review of Systems:   Review of Systems    Past Medical History:      Diagnosis Date    Afib ([facility])     Anxiety     Atrial fibrillation (HCC) 11/13/2011    S/P ablation       Caffeine abuse ([facility])     12 cups per day    Cardiomyopathy ([facility])     Scavina, now back to normal    Chronic bilateral low back pain without sciatica 12/04/2019    Chronic bronchitis ([facility])     Chronic bronchitis with productive mucopurulent cough ([facility]) 08/13/2021    Pt with chronic AM cough upon wakening.    No wheezing.  Not on meds      Chronic rhinitis     exposed to dust    COPD (chronic obstructive pulmonary disease) ([facility]) 05/20/2015    Mild per PFT's 2014 [facility]    COVID-19 11/23/2021    Diagnosis added to problem list by Kevin Musa based on transcribed order from Dr. Perdomo.     History of alcohol abuse

## 2025-02-03 NOTE — PROGRESS NOTES
S: 64 y.o. male with   Chief Complaint   Patient presents with    ED Follow-up     1/30 Abdominal Pain/Enteritis (resolved)       Pt is here for ED follow up for vomiting and nausea.   Zofran helped.   He had enteritis.  Has occasional cramp after eating food.      O: VS:  height is 1.854 m (6' 1\") and weight is 88.9 kg (196 lb). His temporal temperature is 97.9 °F (36.6 °C). His blood pressure is 120/84 and his pulse is 64. His respiration is 16 and oxygen saturation is 98%.   BP Readings from Last 3 Encounters:   02/03/25 120/84   01/31/25 119/84   12/10/24 132/80     See resident note    Impression/Plan:   1) viral GE - improved.   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  Will cont to follow      Health Maintenance Due   Topic Date Due    HIV screen  Never done    Hepatitis C screen  Never done    Shingles vaccine (1 of 2) Never done    Pneumococcal 65+ years Vaccine (2 of 2 - PCV) 05/20/2016    Respiratory Syncytial Virus (RSV) Pregnant or age 60 yrs+ (1 - Risk 60-74 years 1-dose series) Never done    Flu vaccine (1) Never done    COVID-19 Vaccine (1 - 2023-24 season) Never done    Annual Wellness Visit (Medicare Advantage)  01/01/2025         Attending Physician Statement  I have discussed the case, including pertinent history and exam findings with the resident. I also have seen the patient and performed key portions of the examination.  I agree with the documented assessment and plan.      Kianna Varela MD

## 2025-03-10 ENCOUNTER — TELEPHONE (OUTPATIENT)
Dept: ENT CLINIC | Age: 65
End: 2025-03-10

## 2025-03-10 NOTE — TELEPHONE ENCOUNTER
Patient scheduled with Edwardo 3/12/24 due to cancellation.     Electronically signed by Hailee Ham MA on 3/10/25 at 12:13 PM EDT

## 2025-03-10 NOTE — TELEPHONE ENCOUNTER
Pt called office requesting an appt.  He states that he has had ringing in his right ear for the last month.  Please advise.    Electronically signed by Nancy Mcgee on 3/10/2025 at 10:15 AM

## 2025-03-11 ENCOUNTER — OFFICE VISIT (OUTPATIENT)
Dept: ENT CLINIC | Age: 65
End: 2025-03-11
Payer: MEDICARE

## 2025-03-11 ENCOUNTER — PROCEDURE VISIT (OUTPATIENT)
Dept: AUDIOLOGY | Age: 65
End: 2025-03-11
Payer: MEDICARE

## 2025-03-11 VITALS
BODY MASS INDEX: 26.26 KG/M2 | DIASTOLIC BLOOD PRESSURE: 91 MMHG | HEIGHT: 73 IN | SYSTOLIC BLOOD PRESSURE: 125 MMHG | OXYGEN SATURATION: 94 % | HEART RATE: 64 BPM | TEMPERATURE: 97.2 F | WEIGHT: 198.1 LBS

## 2025-03-11 DIAGNOSIS — H65.191 ACUTE EFFUSION OF RIGHT EAR: ICD-10-CM

## 2025-03-11 DIAGNOSIS — H69.93 DYSFUNCTION OF BOTH EUSTACHIAN TUBES: Primary | ICD-10-CM

## 2025-03-11 PROCEDURE — 3080F DIAST BP >= 90 MM HG: CPT

## 2025-03-11 PROCEDURE — 1160F RVW MEDS BY RX/DR IN RCRD: CPT

## 2025-03-11 PROCEDURE — 3074F SYST BP LT 130 MM HG: CPT

## 2025-03-11 PROCEDURE — 1159F MED LIST DOCD IN RCRD: CPT

## 2025-03-11 PROCEDURE — 99213 OFFICE O/P EST LOW 20 MIN: CPT

## 2025-03-11 PROCEDURE — 92567 TYMPANOMETRY: CPT

## 2025-03-11 PROCEDURE — 1123F ACP DISCUSS/DSCN MKR DOCD: CPT

## 2025-03-11 RX ORDER — AZELASTINE 1 MG/ML
1 SPRAY, METERED NASAL 2 TIMES DAILY
Qty: 30 ML | Refills: 1 | Status: SHIPPED | OUTPATIENT
Start: 2025-03-11

## 2025-03-11 ASSESSMENT — ENCOUNTER SYMPTOMS
EYE PAIN: 0
DIARRHEA: 0
SHORTNESS OF BREATH: 0
VOMITING: 0
EYE DISCHARGE: 0
ALLERGIC/IMMUNOLOGIC NEGATIVE: 1
COUGH: 0
SORE THROAT: 0
BACK PAIN: 0
SINUS PRESSURE: 0
RHINORRHEA: 0

## 2025-03-11 NOTE — PROGRESS NOTES
Subjective:     Patient ID:  Zachary Mcgraw is a 65 y.o. male.    HPI:      Tinnitus  Patient presents with tinnitus.Onset of symptoms was gradual 1 month ago ago with unchanged course since that time. Patient describes the tinnitus as fluctuating located in the bilateral ear. The quality is described as variable pitch that sounds like ring. The pattern is nonpulsatile with an intensity that is medium. Patient describes his level of annoyance as minimally annoying, intermittently aware. Associated symptoms include ears feel cloggd Family history is negative family history for tinnitusPatient has had no prior evaluation, treatment or surgery for tinnitus  Previous treatments include none.      Patient does not have hearing aids at this time.  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     Past Medical History:   Diagnosis Date    Afib ([facility])     Anxiety     Atrial fibrillation ([facility]) 11/13/2011    S/P ablation       Caffeine abuse ([facility])     12 cups per day    Cardiomyopathy ([facility])     Scavina, now back to normal    Chronic bilateral low back pain without sciatica 12/04/2019    Chronic bronchitis ([facility])     Chronic bronchitis with productive mucopurulent cough ([facility]) 08/13/2021    Pt with chronic AM cough upon wakening.    No wheezing.  Not on meds      Chronic rhinitis     exposed to dust    COPD (chronic obstructive pulmonary disease) ([facility]) 05/20/2015    Mild per PFT's 2014 [facility]    COVID-19 11/23/2021    Diagnosis added to problem list by Kevin Musa based on transcribed order from Dr. Perdomo.     History of alcohol abuse     Hyperlipidemia     Hypertension     IGT (impaired glucose

## 2025-03-11 NOTE — PROGRESS NOTES
This patient was referred for tympanometric testing by MIGUEL ANGEL Deng due to eustachian tube dysfunction.     Tympanometry revealed flat tympanograms, in the right ear and negative middle ear pressure (-329 daPa), in the left ear.    The results were reviewed with the patient and ordering provider.     Recommendations for follow up will be made pending ordering provider consult.    Rony Abbott/CCC-A  [place] Lic [id number]  Electronically signed by Rony Abbott on 3/11/2025 at 3:47 PM

## 2025-04-22 ENCOUNTER — OFFICE VISIT (OUTPATIENT)
Dept: ENT CLINIC | Age: 65
End: 2025-04-22
Payer: MEDICARE

## 2025-04-22 ENCOUNTER — PROCEDURE VISIT (OUTPATIENT)
Dept: AUDIOLOGY | Age: 65
End: 2025-04-22
Payer: MEDICARE

## 2025-04-22 VITALS
RESPIRATION RATE: 18 BRPM | BODY MASS INDEX: 26.17 KG/M2 | WEIGHT: 197.5 LBS | SYSTOLIC BLOOD PRESSURE: 121 MMHG | HEIGHT: 73 IN | HEART RATE: 64 BPM | DIASTOLIC BLOOD PRESSURE: 87 MMHG | OXYGEN SATURATION: 96 %

## 2025-04-22 DIAGNOSIS — H69.93 DYSFUNCTION OF BOTH EUSTACHIAN TUBES: Primary | ICD-10-CM

## 2025-04-22 DIAGNOSIS — H65.191 ACUTE EFFUSION OF RIGHT EAR: ICD-10-CM

## 2025-04-22 DIAGNOSIS — J30.1 SEASONAL ALLERGIC RHINITIS DUE TO POLLEN: ICD-10-CM

## 2025-04-22 PROCEDURE — 99213 OFFICE O/P EST LOW 20 MIN: CPT

## 2025-04-22 PROCEDURE — 92567 TYMPANOMETRY: CPT

## 2025-04-22 PROCEDURE — 3074F SYST BP LT 130 MM HG: CPT

## 2025-04-22 PROCEDURE — 1123F ACP DISCUSS/DSCN MKR DOCD: CPT

## 2025-04-22 PROCEDURE — 3079F DIAST BP 80-89 MM HG: CPT

## 2025-04-22 RX ORDER — FLUTICASONE PROPIONATE 50 MCG
1 SPRAY, SUSPENSION (ML) NASAL 2 TIMES DAILY
Qty: 16 G | Refills: 1 | Status: SHIPPED | OUTPATIENT
Start: 2025-04-22

## 2025-04-22 RX ORDER — AZELASTINE 1 MG/ML
2 SPRAY, METERED NASAL 2 TIMES DAILY
Qty: 30 ML | Refills: 1 | Status: SHIPPED | OUTPATIENT
Start: 2025-04-22

## 2025-04-22 ASSESSMENT — ENCOUNTER SYMPTOMS
SORE THROAT: 0
SINUS PRESSURE: 0
ALLERGIC/IMMUNOLOGIC NEGATIVE: 1
EYE DISCHARGE: 0
DIARRHEA: 0
COUGH: 0
EYE PAIN: 0
VOMITING: 0
RHINORRHEA: 1
SHORTNESS OF BREATH: 0
BACK PAIN: 0

## 2025-04-22 NOTE — PROGRESS NOTES
This patient was referred for tympanometric testing by MIGUEL ANGEL Deng due to eustachian tube dysfunction.     Tympanometry revealed flat tympanograms in the right ear and revealed negative middle ear pressure (-235 daPa) in the left ear.    The results were reviewed with the patient and ordering provider.     Recommendations for follow up will be made pending physician consult.      Nikki Ballesteros, CCC-A  Clinical Audiologist  [place] License: [id number]    Electronically signed by Rony Koehler on 4/22/2025 at 12:09 PM

## 2025-04-22 NOTE — PROGRESS NOTES
Subjective:      Patient ID:  Zachary Mcgraw is a 65 y.o. male.    HPI:    Patient presents today for recheck of the bilateral ear.  Patient is  doing better.  Patient states he has been having fluctuating symptoms.  States he is currently feeling about 95% better.   Medications:   Flonase and Astelin - stopped using for about 3-4 days about one week ago then restarted    Pain: no  Drainage: no     Has had a lot of PND.     Past Medical History:   Diagnosis Date    Afib ([facility])     Anxiety     Atrial fibrillation (HCC) 11/13/2011    S/P ablation       Caffeine abuse ([facility])     12 cups per day    Cardiomyopathy ([facility])     Scavina, now back to normal    Chronic bilateral low back pain without sciatica 12/04/2019    Chronic bronchitis ([facility])     Chronic bronchitis with productive mucopurulent cough ([facility]) 08/13/2021    Pt with chronic AM cough upon wakening.    No wheezing.  Not on meds      Chronic rhinitis     exposed to dust    COPD (chronic obstructive pulmonary disease) ([facility]) 05/20/2015    Mild per PFT's 2014 [facility]    COVID-19 11/23/2021    Diagnosis added to problem list by Kevin Musa based on transcribed order from Dr. Perdomo.     History of alcohol abuse     Hyperlipidemia     Hypertension     IGT (impaired glucose tolerance) 11/18/2015    Interstitial lung disease ([facility]) 01/02/2024    Left upper lobe consolidation 12/31/2023    Left upper lobe pulmonary infiltrate 01/02/2024    Other primary cardiomyopathies 10/26/2011    Non-ischemic Cardiomyopathy Sees cardio - last visit: unknown. Miguel.      Paraseptal emphysema ([facility]) 12/27/2023    Pneumonia due to Haemophilus influenzae 01/07/2024    Pneumonia due to infectious organism 12/17/2023    Pulmonary fibrosis ([facility]) 01/07/2024    Right shoulder pain     impingement, Duffet    S/P ablation of atrial fibrillation 12/06/2011    ablation at [facility]    Spondylosis of cervical joint     Tobacco abuse      Past Surgical History:   Procedure Laterality Date

## 2025-04-30 ENCOUNTER — OFFICE VISIT (OUTPATIENT)
Dept: FAMILY MEDICINE CLINIC | Age: 65
End: 2025-04-30
Payer: MEDICARE

## 2025-04-30 VITALS
OXYGEN SATURATION: 98 % | SYSTOLIC BLOOD PRESSURE: 122 MMHG | WEIGHT: 198.6 LBS | TEMPERATURE: 97.8 F | HEART RATE: 72 BPM | HEIGHT: 73 IN | DIASTOLIC BLOOD PRESSURE: 84 MMHG | BODY MASS INDEX: 26.32 KG/M2 | RESPIRATION RATE: 18 BRPM

## 2025-04-30 DIAGNOSIS — J32.9 CHRONIC RHINOSINUSITIS: ICD-10-CM

## 2025-04-30 DIAGNOSIS — H93.8X1 EAR FULLNESS, RIGHT: Primary | ICD-10-CM

## 2025-04-30 DIAGNOSIS — H93.11 TINNITUS OF RIGHT EAR: ICD-10-CM

## 2025-04-30 DIAGNOSIS — N20.0 NEPHROLITHIASIS: ICD-10-CM

## 2025-04-30 PROCEDURE — 3074F SYST BP LT 130 MM HG: CPT

## 2025-04-30 PROCEDURE — 3079F DIAST BP 80-89 MM HG: CPT

## 2025-04-30 PROCEDURE — 1123F ACP DISCUSS/DSCN MKR DOCD: CPT

## 2025-04-30 PROCEDURE — 99213 OFFICE O/P EST LOW 20 MIN: CPT

## 2025-04-30 PROCEDURE — 1159F MED LIST DOCD IN RCRD: CPT

## 2025-04-30 RX ORDER — GUAIFENESIN 600 MG/1
600 TABLET, EXTENDED RELEASE ORAL 2 TIMES DAILY
Qty: 30 TABLET | Refills: 0 | Status: SHIPPED | OUTPATIENT
Start: 2025-04-30 | End: 2025-05-15

## 2025-04-30 RX ORDER — BENZONATATE 100 MG/1
100 CAPSULE ORAL 3 TIMES DAILY PRN
COMMUNITY

## 2025-04-30 NOTE — PROGRESS NOTES
S: 65 y.o. male with   Chief Complaint   Patient presents with    Ear Fullness     R side, following with ENT  +persistent ringing    Cough     Concern for URI       Pt is here for right ear fullness and ringing.  Now it is also in the left ear.  Nothing makes it better or worse.  Also has ringing in his ears.  Has some post-nasal drip.  He also has a cough.  The ENT\"s put him on astelin and flonase.  This is helping just a bit.  Typanograms were flat in ENT.  Has a 5mm renal stone.    O: VS:  height is 1.854 m (6' 1\") and weight is 90.1 kg (198 lb 9.6 oz). His temporal temperature is 97.8 °F (36.6 °C). His blood pressure is 122/84 and his pulse is 72. His respiration is 18 and oxygen saturation is 98%.   BP Readings from Last 3 Encounters:   04/30/25 122/84   04/22/25 121/87   03/11/25 (!) 125/91     See resident note      Impression/Plan:   1) eustation tube dysfunction - cont on nasal sprays and add mucinex.  Try augmentin for 7 days.         Health Maintenance Due   Topic Date Due    Hepatitis C screen  Never done    Shingles vaccine (1 of 2) Never done    Pneumococcal 50+ years Vaccine (2 of 2 - PCV) 05/20/2016    Respiratory Syncytial Virus (RSV) Pregnant or age 60 yrs+ (1 - Risk 60-74 years 1-dose series) Never done    COVID-19 Vaccine (1 - 2024-25 season) Never done    Annual Wellness Visit (Medicare Advantage)  01/01/2025         Attending Physician Statement  I have discussed the case, including pertinent history and exam findings with the resident. I also have seen the patient and performed key portions of the examination.  I agree with the documented assessment and plan.      Kianna Varela MD

## 2025-04-30 NOTE — PROGRESS NOTES
[facility]  Family Medicine Residency  Phone: [phone]  Fax: [phone]    Patient:  Zachary Mcgraw 65 y.o. male                                 Date of Service: 4/30/25                            Chiefcomplaint:   Chief Complaint   Patient presents with    Ear Fullness     R side, following with ENT  +persistent ringing    Cough     Concern for URI         History of Present Illness:     The patient is a 65 y.o. male  PMH HTN, s/p ablation of a fib, tubular adenoma of colon presented to the clinic with complaints of right ear fullness    When did the ear fullness start? 1-2 months ago  Is it constant or intermittent? intermtitent  Is it in one ear or both ears? Right ear and occasionally in left ear since 2 days  Can you describe the sensation (e.g., pressure, blockage, muffled hearing)? Pressure and reduced hearing  Does anything make it better or worse (e.g., yawning, swallowing, altitude)?nothing he knows of  Do you have hearing loss? no  Any ringing in the ears (tinnitus)? Yes,since 8 months  Have you experienced dizziness or vertigo? no  Any ear pain or discharge? no  Do you feel popping or crackling sounds? Popping sounds.  Have you had a recent cold, sinus infection, or upper respiratory illness? H/o allergies on and off,cough with clear mucus since 1 week  Do you suffer from allergies or hay fever? Not diagnosed  Any nasal congestion or postnasal drip? Postnasal drip present  Have you had frequent ear infections? yes  Any history of ear surgery or trauma? no  Any recent barotrauma (e.g., flying, scuba diving)? no  Do you have any facial weakness or numbness? no  Has it affected your hearing or balance? no  Are you able to function normally at work? yes  Do you have a history of TMJ disorder? no  Are you currently taking any medications? Flonase and astelin. Ent following  Do you have any known allergies? Not known    Recently had CT chest and found to have Several 3 mm

## 2025-06-04 ENCOUNTER — PROCEDURE VISIT (OUTPATIENT)
Dept: AUDIOLOGY | Age: 65
End: 2025-06-04
Payer: MEDICARE

## 2025-06-04 ENCOUNTER — TELEPHONE (OUTPATIENT)
Dept: ENT CLINIC | Age: 65
End: 2025-06-04

## 2025-06-04 ENCOUNTER — OFFICE VISIT (OUTPATIENT)
Dept: ENT CLINIC | Age: 65
End: 2025-06-04
Payer: MEDICARE

## 2025-06-04 ENCOUNTER — PREP FOR PROCEDURE (OUTPATIENT)
Dept: ENT CLINIC | Age: 65
End: 2025-06-04

## 2025-06-04 VITALS
BODY MASS INDEX: 25.84 KG/M2 | SYSTOLIC BLOOD PRESSURE: 133 MMHG | WEIGHT: 195 LBS | OXYGEN SATURATION: 98 % | HEIGHT: 73 IN | HEART RATE: 56 BPM | RESPIRATION RATE: 16 BRPM | TEMPERATURE: 97.8 F | DIASTOLIC BLOOD PRESSURE: 89 MMHG

## 2025-06-04 DIAGNOSIS — H65.491 CHRONIC OTITIS MEDIA OF RIGHT EAR WITH EFFUSION: ICD-10-CM

## 2025-06-04 DIAGNOSIS — H69.93 DYSFUNCTION OF BOTH EUSTACHIAN TUBES: Primary | ICD-10-CM

## 2025-06-04 DIAGNOSIS — H66.93 BILATERAL CHRONIC OTITIS MEDIA: ICD-10-CM

## 2025-06-04 PROCEDURE — 1160F RVW MEDS BY RX/DR IN RCRD: CPT

## 2025-06-04 PROCEDURE — 1159F MED LIST DOCD IN RCRD: CPT

## 2025-06-04 PROCEDURE — 3079F DIAST BP 80-89 MM HG: CPT

## 2025-06-04 PROCEDURE — 92567 TYMPANOMETRY: CPT | Performed by: AUDIOLOGIST

## 2025-06-04 PROCEDURE — 3075F SYST BP GE 130 - 139MM HG: CPT

## 2025-06-04 PROCEDURE — 1123F ACP DISCUSS/DSCN MKR DOCD: CPT

## 2025-06-04 PROCEDURE — 99213 OFFICE O/P EST LOW 20 MIN: CPT

## 2025-06-04 ASSESSMENT — ENCOUNTER SYMPTOMS
SORE THROAT: 0
RHINORRHEA: 0
ALLERGIC/IMMUNOLOGIC NEGATIVE: 1
DIARRHEA: 0
SHORTNESS OF BREATH: 0
EYE PAIN: 0
SINUS PRESSURE: 0
BACK PAIN: 0
VOMITING: 0
COUGH: 0
EYE DISCHARGE: 0

## 2025-06-04 NOTE — TELEPHONE ENCOUNTER
Prior Authorization Form:      DEMOGRAPHICS:                     Patient Name:  Xiomara Mcgraw  Patient :  1960            Insurance:  [facility] / Plan: [facility] / Product Type: *No Product type* /   Insurance ID Number:    Payer/Plan Subscr  Sex Relation Sub. Ins. ID Effective Group Num   1. [facility] XIOMARA MCGRAW 1960 Male Self [id number] 3/1/24 [id number]                                   [address]         DIAGNOSIS & PROCEDURE:                       Procedure/Operation: bilateral myringotomy tube insertion           CPT Code: 77604    Diagnosis:  chronic otitis media     ICD10 Code: h66.93    Location:  [facility]    Surgeon:  belen    SCHEDULING INFORMATION:                          Date: 25    Time: n/a              Anesthesia:  General                                                       Status:  Outpatient        Special Comments:  include all chart notes        Electronically signed by Dheeraj Brunson MA on 2025 at 8:30 AM

## 2025-06-04 NOTE — PROGRESS NOTES
Subjective:      Patient ID:  Zachary Mcgraw is a 65 y.o. male.    HPI:    PT returns for recheck of ear effusion. The effusion has been present for 5 month(s). The patient is  doing well and thinks the hearing is not better.  Pt has been using Flonase and astelin daily. Continues to feel like the left ear is plugged and occasionally gets clogging in the left ear. Previous nasal endoscopy was clear fro lesions or masses.       Past Medical History:   Diagnosis Date    Afib ([facility])     Anxiety     Atrial fibrillation (HCC) 11/13/2011    S/P ablation       Caffeine abuse (HCC)     12 cups per day    Cardiomyopathy (HCC)     Scavina, now back to normal    Chronic bilateral low back pain without sciatica 12/04/2019    Chronic bronchitis ([facility])     Chronic bronchitis with productive mucopurulent cough ([facility]) 08/13/2021    Pt with chronic AM cough upon wakening.    No wheezing.  Not on meds      Chronic rhinitis     exposed to dust    COPD (chronic obstructive pulmonary disease) ([facility]) 05/20/2015    Mild per PFT's 2014 [facility]    COVID-19 11/23/2021    Diagnosis added to problem list by Kevin Musa based on transcribed order from Dr. Perdomo.     History of alcohol abuse     Hyperlipidemia     Hypertension     IGT (impaired glucose tolerance) 11/18/2015    Interstitial lung disease ([facility]) 01/02/2024    Left upper lobe consolidation 12/31/2023    Left upper lobe pulmonary infiltrate 01/02/2024    Other primary cardiomyopathies 10/26/2011    Non-ischemic Cardiomyopathy Sees cardio - last visit: unknown. Miguel.      Paraseptal emphysema ([facility]) 12/27/2023    Pneumonia due to Haemophilus influenzae 01/07/2024    Pneumonia due to infectious organism 12/17/2023    Pulmonary fibrosis ([facility]) 01/07/2024    Right shoulder pain     impingement, Duffet    S/P ablation of atrial fibrillation 12/06/2011    ablation at [facility]    Spondylosis of cervical joint     Tobacco abuse      Past Surgical History:   Procedure Laterality Date    
24-Jul-2023 22:01

## 2025-06-04 NOTE — PROGRESS NOTES
This patient was referred for audiometric/tympanometric testing by MIGUEL ANGEL Deng due to SYLWIA.        Tympanometry revealed essentially flat tympanograms, in the right ear. Left ear revealed slight negative pressure (-160 daPa).         The results were reviewed with the patient and CNP.     Recommendations for follow up will be made pending ordering provider consult.    Rony Sandoval/CCC-A  [place] Lic # [id number]

## 2025-06-04 NOTE — PATIENT INSTRUCTIONS
Thank you for choosing our [facility]. We are committed to your medical treatment and  care. If you need to reschedule or cancel your surgery or follow up  appointment, please call the surgery scheduler at [phone].    INSTRUCTIONS FOR SURGERY BMT    Nothing to eat or drink after midnight the night before surgery unless surgery is at [facility] or otherwise instructed by the hospital.    DO NOT TAKE ANY ASPIRIN PRODUCTS 7 days prior to surgery-unless required by your cardiologist or primary care physician. Tylenol only. No Advil, Motrin, Aleve, or Ibuprofen    Any illegal drugs in your system (including Marijuana even if legally prescribed) will result in your surgery being cancelled. Please be sure to check with our office or the hospital on time frame for the drugs to be out of your system.    Should your insurance change at any time you must contact our office. Failure to do so may result in your surgery being rescheduled.    If you need paperwork filled out for work, you must give the office 2 weeks to complete and submit the forms.      O [facility], [address] [place] [address] will call you a couple days prior to your surgery and give you further instructions, if any questions call them at [phone]

## 2025-06-10 ENCOUNTER — OFFICE VISIT (OUTPATIENT)
Dept: FAMILY MEDICINE CLINIC | Age: 65
End: 2025-06-10
Payer: MEDICARE

## 2025-06-10 VITALS
SYSTOLIC BLOOD PRESSURE: 124 MMHG | BODY MASS INDEX: 25.83 KG/M2 | TEMPERATURE: 97.6 F | WEIGHT: 194.89 LBS | HEIGHT: 73 IN | OXYGEN SATURATION: 98 % | RESPIRATION RATE: 16 BRPM | HEART RATE: 56 BPM | DIASTOLIC BLOOD PRESSURE: 72 MMHG

## 2025-06-10 DIAGNOSIS — H93.8X1 EAR FULLNESS, RIGHT: Primary | ICD-10-CM

## 2025-06-10 DIAGNOSIS — Z01.818 PREOP EXAMINATION: ICD-10-CM

## 2025-06-10 PROCEDURE — 99213 OFFICE O/P EST LOW 20 MIN: CPT

## 2025-06-10 PROCEDURE — 1159F MED LIST DOCD IN RCRD: CPT

## 2025-06-10 PROCEDURE — 1123F ACP DISCUSS/DSCN MKR DOCD: CPT

## 2025-06-10 PROCEDURE — 3078F DIAST BP <80 MM HG: CPT

## 2025-06-10 PROCEDURE — 93000 ELECTROCARDIOGRAM COMPLETE: CPT

## 2025-06-10 PROCEDURE — 3074F SYST BP LT 130 MM HG: CPT

## 2025-06-10 NOTE — PROGRESS NOTES
CC: Pre-op Exam (B/L Myringotomy scheduled for 6/23 with Dr. Boudreaux)      HPI:  Zachary Mcgraw is a 65 y.o. yo male presents for a preoperative consultation at the request of surgeon, Dr. Ely, who plans on performing tympanostomy on 6/23/25 at [facility].    Patient states has been c/o ear fullness for a least a year, conservative measures were not successful in treating issue.    Pre-Operative Risk assessment using 2014 ACC/AHA guidelines   Emergent procedure No  Active Cardiac Condition No (decompensated HF, Arrhythmia, MI <3 weeks, severe valve disease)  Risk Level of Procedure Low Risk (endoscopy, superficial skin, breast, ambulatory, or cataract, etc.)  Measurement of Exercise Tolerance before Surgery >4 without symptoms: Yes, Pt can Climb a flight of stairs or walk up a hill (5.50 METs), Participate in moderate recreational activities, such as golf, bowling,dancing, double tennis, or throwing a baseball or football (6.00 METs), and Do heavy work around the house, such as scrubbing floors or lifting or moving heavy furniture (8.00 METs).  Revised Cardiac Risk Index Risk factors:  [] High-risk surgery (intrathoracic, intra-abdominal, or suprainguinal vascular surgery)  [] Ischemic heart disease (hx of MI or a +exercise test, current chest pain considered due to MI, use of nitrate therapy, or ECG with pathological Q waves; do not count prior coronary revascularization procedure unless one of the other criteria for ischemic heart disease is present)  [] Hx of cerebrovascular disease  [] Hx of Heart failure  [] DM requiring insulin   [] Preop Cr > 2.0 mg/dL No results for input(s): \"CREATININE\" in the last 720 hours.     Other items of interest:  Planned anesthesia: unsure   Known anesthesia problems: None   Bleeding risk: No recent or remote history of abnormal bleeding  Personal or FMHx of DVT/PE: No  Patient objection to receiving blood products: No      ROS   Pt denies fever, chills,

## 2025-06-10 NOTE — PROGRESS NOTES
S: 65 y.o. male with   Chief Complaint   Patient presents with    Pre-op Exam     B/L Myringotomy scheduled for 6/23 with Dr. Boudreaux       Pre-op: B/L Myringotomy scheduled for 6/23 with Dr. Boudreaux, non-emergent, no active cardiac conditions, unknown anesthesia, no previous difficulty with anesthesia, >4 METs,     O: VS:  height is 1.854 m (6' 1\") and weight is 88.4 kg (194 lb 14.2 oz). His temporal temperature is 97.6 °F (36.4 °C). His blood pressure is 124/72 and his pulse is 56. His respiration is 16 and oxygen saturation is 98%.   BP Readings from Last 3 Encounters:   06/10/25 124/72   06/04/25 133/89   04/30/25 122/84     See resident note    Impression/Plan:   1) Pre-op - low risk for a low risk procedure, encouraged smoking cessation prior to surgery, EKG showed slight sinus ron otherwise no abnormalities    Health Maintenance Due   Topic Date Due    Hepatitis C screen  Never done    Shingles vaccine (1 of 2) Never done    Pneumococcal 50+ years Vaccine (2 of 2 - PCV) 05/20/2016    Respiratory Syncytial Virus (RSV) Pregnant or age 60 yrs+ (1 - Risk 60-74 years 1-dose series) Never done    COVID-19 Vaccine (1 - 2024-25 season) Never done    Annual Wellness Visit (Medicare Advantage)  01/01/2025    DTaP/Tdap/Td vaccine (2 - Td or Tdap) 05/20/2025       Attending Physician Statement  I have discussed the case, including pertinent history and exam findings with the resident.  I agree with the documented assessment and plan.      Werner Herrera,

## 2025-06-18 ENCOUNTER — TRANSCRIBE ORDERS (OUTPATIENT)
Dept: ADMINISTRATIVE | Age: 65
End: 2025-06-18

## 2025-06-18 DIAGNOSIS — R91.8 LUNG NODULES: Primary | ICD-10-CM

## 2025-06-18 RX ORDER — MULTIVITAMIN WITH IRON
1 TABLET ORAL DAILY
COMMUNITY

## 2025-06-18 NOTE — PROGRESS NOTES
[facility] PRE-ADMISSION TESTING INSTRUCTIONS    The Preadmission Testing patient is instructed accordingly using the following criteria (check applicable):    ARRIVAL INSTRUCTIONS:   Arrival Time: 0900    [x] Parking the day of Surgery is located in the Main Entrance lot.  Upon entering through the main entrance (Entrance A) make an immediate right to the surgery reception desk    [x] Bring photo ID and insurance card      [x] Please be sure to arrange for a responsible adult to provide transportation to and from the hospital    [x] Please arrange for a responsible adult to be with you for the 24 hour period post procedure, due to having anesthesia    [x] Please notify surgeon if you develop any illness between now and time of surgery (cold, cough, sore throat, fever, nausea, vomiting) or any signs of infections  including skin, wounds, and dental.    [x] If you awake am of surgery not feeling well or have temperature >100 please call [phone].    GENERAL INSTRUCTIONS:    [x] No solid foods after midnight, may have up to 8oz of water until 4 hours prior to surgery. No gum, no candy, no mints.  NPO time: 0700       [x] You may brush your teeth    [x] Take medications as instructed (Read back and verified by patient)   Take coreg    [x] Stop herbal supplements and vitamins 5 days prior to procedure    [x] Shower or bath with soap, lather and rinse well, AM of Surgery, no lotion, powders or creams to surgical site    [x] Please do not wear any nail polish, make up, hair products, body spray, aftershave, cologne or perfume on the day of surgery    [x] Jewelry, body piercings, eyeglasses, contact lenses and dentures are not permitted into surgery (bring cases)    [x] No tobacco products within 24 hours of surgery     [x] No alcohol or illegal drug use, marijuana included, within 24 hours of surgery.    [x] You can expect a call the business day prior to procedure to notify you if your

## 2025-06-19 ENCOUNTER — ANESTHESIA EVENT (OUTPATIENT)
Dept: OPERATING ROOM | Age: 65
End: 2025-06-19
Payer: MEDICARE

## 2025-06-19 DIAGNOSIS — H69.93 DYSFUNCTION OF BOTH EUSTACHIAN TUBES: ICD-10-CM

## 2025-06-19 DIAGNOSIS — J30.1 SEASONAL ALLERGIC RHINITIS DUE TO POLLEN: Primary | ICD-10-CM

## 2025-06-19 RX ORDER — FLUTICASONE PROPIONATE 50 MCG
SPRAY, SUSPENSION (ML) NASAL
Qty: 16 ML | Refills: 1 | Status: SHIPPED | OUTPATIENT
Start: 2025-06-19

## 2025-06-23 ENCOUNTER — HOSPITAL ENCOUNTER (OUTPATIENT)
Age: 65
Setting detail: OUTPATIENT SURGERY
Discharge: HOME | End: 2025-06-23
Attending: OTOLARYNGOLOGY | Admitting: OTOLARYNGOLOGY
Payer: MEDICARE

## 2025-06-23 ENCOUNTER — ANESTHESIA (OUTPATIENT)
Dept: OPERATING ROOM | Age: 65
End: 2025-06-23
Payer: MEDICARE

## 2025-06-23 VITALS
RESPIRATION RATE: 16 BRPM | DIASTOLIC BLOOD PRESSURE: 77 MMHG | OXYGEN SATURATION: 98 % | BODY MASS INDEX: 26.28 KG/M2 | SYSTOLIC BLOOD PRESSURE: 148 MMHG | TEMPERATURE: 97.2 F | HEIGHT: 72 IN | WEIGHT: 194 LBS | HEART RATE: 60 BPM

## 2025-06-23 PROCEDURE — 3700000000 HC ANESTHESIA ATTENDED CARE: Performed by: OTOLARYNGOLOGY

## 2025-06-23 PROCEDURE — 3600000012 HC SURGERY LEVEL 2 ADDTL 15MIN: Performed by: OTOLARYNGOLOGY

## 2025-06-23 PROCEDURE — 2580000003 HC RX 258

## 2025-06-23 PROCEDURE — 7100000010 HC PHASE II RECOVERY - FIRST 15 MIN: Performed by: OTOLARYNGOLOGY

## 2025-06-23 PROCEDURE — 6360000002 HC RX W HCPCS

## 2025-06-23 PROCEDURE — 3700000001 HC ADD 15 MINUTES (ANESTHESIA): Performed by: OTOLARYNGOLOGY

## 2025-06-23 PROCEDURE — 7100000001 HC PACU RECOVERY - ADDTL 15 MIN: Performed by: OTOLARYNGOLOGY

## 2025-06-23 PROCEDURE — 7100000000 HC PACU RECOVERY - FIRST 15 MIN: Performed by: OTOLARYNGOLOGY

## 2025-06-23 PROCEDURE — L8699 PROSTHETIC IMPLANT NOS: HCPCS | Performed by: OTOLARYNGOLOGY

## 2025-06-23 PROCEDURE — 2709999900 HC NON-CHARGEABLE SUPPLY: Performed by: OTOLARYNGOLOGY

## 2025-06-23 PROCEDURE — 6370000000 HC RX 637 (ALT 250 FOR IP): Performed by: OTOLARYNGOLOGY

## 2025-06-23 PROCEDURE — 69436 CREATE EARDRUM OPENING: CPT | Performed by: OTOLARYNGOLOGY

## 2025-06-23 PROCEDURE — 7100000011 HC PHASE II RECOVERY - ADDTL 15 MIN: Performed by: OTOLARYNGOLOGY

## 2025-06-23 PROCEDURE — 3600000002 HC SURGERY LEVEL 2 BASE: Performed by: OTOLARYNGOLOGY

## 2025-06-23 DEVICE — IMPLANTABLE DEVICE: Type: IMPLANTABLE DEVICE | Site: EAR | Status: FUNCTIONAL

## 2025-06-23 RX ORDER — CIPROFLOXACIN HYDROCHLORIDE 3.5 MG/ML
SOLUTION/ DROPS TOPICAL PRN
Status: DISCONTINUED | OUTPATIENT
Start: 2025-06-23 | End: 2025-06-23 | Stop reason: ALTCHOICE

## 2025-06-23 RX ORDER — ONDANSETRON 2 MG/ML
INJECTION INTRAMUSCULAR; INTRAVENOUS
Status: DISCONTINUED | OUTPATIENT
Start: 2025-06-23 | End: 2025-06-23 | Stop reason: SDUPTHER

## 2025-06-23 RX ORDER — SODIUM CHLORIDE 9 MG/ML
INJECTION, SOLUTION INTRAVENOUS PRN
Status: DISCONTINUED | OUTPATIENT
Start: 2025-06-23 | End: 2025-06-23 | Stop reason: HOSPADM

## 2025-06-23 RX ORDER — DEXAMETHASONE SODIUM PHOSPHATE 4 MG/ML
INJECTION, SOLUTION INTRA-ARTICULAR; INTRALESIONAL; INTRAMUSCULAR; INTRAVENOUS; SOFT TISSUE
Status: DISCONTINUED | OUTPATIENT
Start: 2025-06-23 | End: 2025-06-23 | Stop reason: SDUPTHER

## 2025-06-23 RX ORDER — SODIUM CHLORIDE 0.9 % (FLUSH) 0.9 %
5-40 SYRINGE (ML) INJECTION EVERY 12 HOURS SCHEDULED
Status: DISCONTINUED | OUTPATIENT
Start: 2025-06-23 | End: 2025-06-23 | Stop reason: HOSPADM

## 2025-06-23 RX ORDER — PROPOFOL 10 MG/ML
INJECTION, EMULSION INTRAVENOUS
Status: DISCONTINUED | OUTPATIENT
Start: 2025-06-23 | End: 2025-06-23 | Stop reason: SDUPTHER

## 2025-06-23 RX ORDER — SODIUM CHLORIDE 0.9 % (FLUSH) 0.9 %
5-40 SYRINGE (ML) INJECTION PRN
Status: DISCONTINUED | OUTPATIENT
Start: 2025-06-23 | End: 2025-06-23 | Stop reason: HOSPADM

## 2025-06-23 RX ORDER — CIPROFLOXACIN AND DEXAMETHASONE 3; 1 MG/ML; MG/ML
4 SUSPENSION/ DROPS AURICULAR (OTIC) 2 TIMES DAILY
Qty: 7.5 ML | Refills: 0 | Status: SHIPPED | OUTPATIENT
Start: 2025-06-23 | End: 2025-06-30

## 2025-06-23 RX ADMIN — PROPOFOL 50 MG: 10 INJECTION, EMULSION INTRAVENOUS at 14:01

## 2025-06-23 RX ADMIN — SODIUM CHLORIDE: 9 INJECTION, SOLUTION INTRAVENOUS at 13:45

## 2025-06-23 RX ADMIN — ONDANSETRON 4 MG: 2 INJECTION, SOLUTION INTRAMUSCULAR; INTRAVENOUS at 13:54

## 2025-06-23 RX ADMIN — DEXAMETHASONE SODIUM PHOSPHATE 8 MG: 4 INJECTION, SOLUTION INTRAMUSCULAR; INTRAVENOUS at 13:54

## 2025-06-23 RX ADMIN — PROPOFOL 200 MG: 10 INJECTION, EMULSION INTRAVENOUS at 13:54

## 2025-06-23 ASSESSMENT — PAIN DESCRIPTION - PAIN TYPE: TYPE: SURGICAL PAIN

## 2025-06-23 ASSESSMENT — PAIN DESCRIPTION - FREQUENCY: FREQUENCY: CONTINUOUS

## 2025-06-23 ASSESSMENT — PAIN SCALES - GENERAL
PAINLEVEL_OUTOF10: 0
PAINLEVEL_OUTOF10: 0
PAINLEVEL_OUTOF10: 2
PAINLEVEL_OUTOF10: 0

## 2025-06-23 ASSESSMENT — PAIN DESCRIPTION - ORIENTATION: ORIENTATION: RIGHT;LEFT

## 2025-06-23 ASSESSMENT — PAIN DESCRIPTION - LOCATION: LOCATION: EAR

## 2025-06-23 ASSESSMENT — LIFESTYLE VARIABLES: SMOKING_STATUS: 1

## 2025-06-23 ASSESSMENT — PAIN DESCRIPTION - DESCRIPTORS: DESCRIPTORS: PRESSURE

## 2025-06-23 NOTE — ANESTHESIA POSTPROCEDURE EVALUATION
Department of Anesthesiology  Postprocedure Note    Patient: XIOMARA CARNEY  MRN: [id number]  YOB: 1960  Date of evaluation: 6/23/2025    Procedure Summary       Date: 06/23/25 Room / Location: [address]    Anesthesia Start: 1345 Anesthesia Stop: 1422    Procedure: BILATERAL MYRINGOTOMY EAR TUBE INSERTION (Bilateral: Ear) Diagnosis:       Bilateral chronic otitis media      (Bilateral chronic otitis media [H66.93])    Surgeons: Swapnil Boudreaux DO Responsible Provider: Shanell Ely DO    Anesthesia Type: MAC ASA Status: 3            Anesthesia Type: No value filed.    Thomas Phase I: Thomas Score: 10    Thomas Phase II:      Anesthesia Post Evaluation    Patient location during evaluation: PACU  Patient participation: complete - patient participated  Level of consciousness: awake and alert  Pain score: 1  Airway patency: patent  Nausea & Vomiting: no nausea and no vomiting  Cardiovascular status: hemodynamically stable  Respiratory status: acceptable  Pain management: adequate        No notable events documented.

## 2025-06-23 NOTE — H&P
Subjective:      Patient ID:  Zachary Mcgraw is a 65 y.o. male.     HPI:     PT returns for recheck of ear effusion. The effusion has been present for 5 month(s). The patient is  doing well and thinks the hearing is not better.  Pt has been using Flonase and astelin daily. Continues to feel like the left ear is plugged and occasionally gets clogging in the left ear. Previous nasal endoscopy was clear fro lesions or masses.         Past Medical History        Past Medical History:   Diagnosis Date    Afib ([facility])      Anxiety      Atrial fibrillation (HCC) 11/13/2011     S/P ablation       Caffeine abuse (HCC)       12 cups per day    Cardiomyopathy (HCC)       Scavina, now back to normal    Chronic bilateral low back pain without sciatica 12/04/2019    Chronic bronchitis ([facility])      Chronic bronchitis with productive mucopurulent cough (HCC) 08/13/2021     Pt with chronic AM cough upon wakening.    No wheezing.  Not on meds      Chronic rhinitis       exposed to dust    COPD (chronic obstructive pulmonary disease) ([facility]) 05/20/2015     Mild per PFT's 2014 [facility]    COVID-19 11/23/2021     Diagnosis added to problem list by Kevin Musa based on transcribed order from Dr. Perdomo.     History of alcohol abuse      Hyperlipidemia      Hypertension      IGT (impaired glucose tolerance) 11/18/2015    Interstitial lung disease (HCC) 01/02/2024    Left upper lobe consolidation 12/31/2023    Left upper lobe pulmonary infiltrate 01/02/2024    Other primary cardiomyopathies 10/26/2011     Non-ischemic Cardiomyopathy Sees cardio - last visit: unknown. Miguel.      Paraseptal emphysema ([facility]) 12/27/2023    Pneumonia due to Haemophilus influenzae 01/07/2024    Pneumonia due to infectious organism 12/17/2023    Pulmonary fibrosis (HCC) 01/07/2024    Right shoulder pain       impingement, Duffet    S/P ablation of atrial fibrillation 12/06/2011     ablation at [facility]    Spondylosis of cervical joint      Tobacco abuse

## 2025-06-23 NOTE — DISCHARGE INSTRUCTIONS
If there is no fluid/infection in the ears today - drops are not needed at this time.    If drainage from either ear occurs, then:  Place 3 drops in both ears three times a day for 7 days     Pt may go into water without using plugs.  If patient is diving below 6 ft then plugs should be used due to water pressure through the tubes.    Infection occurs when you see crusting or fluid coming out of the ear canal.   If you see ear drainage, start the prescribed ear drops 3 drops 3 times a day.     After 3-4 days if the drainage continues and is not slowing down, bring patient to office for evaluation.      If the drainage is improving after 3-4 days, then continue drops for 7 days.    Return to office as scheduled for tube evaluation every 4 months.

## 2025-06-23 NOTE — ANESTHESIA PRE PROCEDURE
Department of Anesthesiology  Preprocedure Note       Name:  XIOMARA CARNEY   Age:  65 y.o.  :  1960                                          MRN:  [id number]         Date:  2025      Surgeon: Surgeon(s):  Swapnil Boudreaux DO    Procedure: Procedure(s):  BILATERAL MYRINGOTOMY EAR TUBE INSERTION    Medications prior to admission:   Prior to Admission medications    Medication Sig Start Date End Date Taking? Authorizing Provider   Multiple Vitamin (MULTIVITAMIN) TABS tablet Take 1 tablet by mouth daily   Yes Provider, MD Char   carvedilol (COREG) 12.5 MG tablet TAKE 1 TABLET BY MOUTH TWICE A DAY 10/29/24  Yes Kianna Varela MD   fluticasone (FLONASE) 50 MCG/ACT nasal spray SPRAY 1 SPRAY INTO EACH NOSTRIL TWICE A DAY 25   Nav Granger DO   azelastine (ASTELIN) 0.1 % nasal spray 2 sprays by Nasal route 2 times daily Use in each nostril as directed  Patient not taking: Reported on 2025   Adrian Brooke, FRANCIA - CNP       Current medications:    Current Facility-Administered Medications   Medication Dose Route Frequency Provider Last Rate Last Admin    sodium chloride flush 0.9 % injection 5-40 mL  5-40 mL IntraVENous 2 times per day Adi Roth DO        sodium chloride flush 0.9 % injection 5-40 mL  5-40 mL IntraVENous PRN Adi Roth DO        0.9 % sodium chloride infusion   IntraVENous PRN Adi Roth DO           Allergies:  No Known Allergies    Problem List:    Patient Active Problem List   Diagnosis Code    Benign essential hypertension I10    S/P ablation of atrial fibrillation Z98.890, Z86.79    History of alcohol abuse F10.11    Spondylosis of cervical joint M47.812    Chronic rhinitis J31.0    Chronic bilateral low back pain without sciatica M54.50, G89.29    Tubular adenoma of colon D12.6    History of hemoptysis Z87.898    Bilateral chronic otitis media H66.93       Past Medical History:        Diagnosis Date    Anxiety     Atrial fibrillation (HCC)

## 2025-06-23 NOTE — OP NOTE
Operative Note      Patient: XIOMARA CARNEY  YOB: 1960  MRN: [id number]    Date of Procedure: 6/23/2025    Pre-Op Diagnosis Codes:      * Bilateral chronic otitis media [H66.93]    Post-Op Diagnosis: Same       Procedure(s):  BILATERAL MYRINGOTOMY EAR TUBE INSERTION    Surgeon(s):  Swapnil Boudreaux DO    Assistant:   Resident: Adi Roth DO    Anesthesia: General    Estimated Blood Loss (mL): Minimal    Complications: None    Specimens:   * No specimens in log *    Implants:  Implant Name Type Inv. Item Serial No.  Lot No. LRB No. Used Action   TUBE VENT ID1.32MM ALYCE FRANKLYN T MOD FOR MYR CANDELARIA 6PK - [id number]  TUBE VENT ID1.32MM ALYCE FRANKLYN T MOD FOR MYR CANDELARIA 6PK  [employer] [id number] Left 1 Implanted   TUBE VENT ID1.32MM ALYCE FRANKLYN T MOD FOR MYR CANDELARIA 6PK - [id number]  TUBE VENT ID1.32MM ALYCE FRANKLYN T MOD FOR MYR CANDELARIA 6PK  [employer] [id number] Right 1 Implanted         Drains: * No LDAs found *    Findings:  Bilateral intact TM with air-filled middle ear on left and serous effusion on right    Indication: PT presented with a history of chronic otitis media with effusion on the right for the last  several months     Procedure:  Pt was consented preoperatively, taken to the OR and identified appropriately.  Pt was placed in the supine position and given to anesthesia for induction via face mask.     Right  A microscope was brought in and a speculum was placed in the right ear and the external auditory canal was cleared of cerumen with a curette.  With the tympanic membrane visualized, there was not infection and was effusion present.  A myringotomy knife was used to make an incision in the anterior-inferior portion of the TM.  Effusion was removed with a #3 Pérez tip suction until the middle ear space was cleared.  A T  tube was place in the incision.     Left  A microscope was brought in and a speculum was placed in the left ear and the external

## 2025-06-23 NOTE — H&P
XIOMARA CARNEY was seen and re-examined preoperatively today, June 23, 2025.  There was no substantial change in his physical and medical status. All Meds and Family/Social/Previous history was reviewed and there were no significant changes. Patient is fit for the proposed surgical procedure.  All questions were appropriately addressed and had no further questions regarding the risks, benefits, and alternatives of the procedure.  XIOMARA CARNEY and family wished to proceed.    Vitals  Blood pressure 120/81, pulse 58, temperature 97.4 °F (36.3 °C), temperature source Temporal, resp. rate 18, height 1.829 m (6'), weight 88 kg (194 lb), SpO2 98%.    Adi Roth DO  Resident Physician  [facility]  Otolaryngology Residency  6/23/2025  1:39 PM

## 2025-06-26 PROBLEM — H69.93 ETD (EUSTACHIAN TUBE DYSFUNCTION), BILATERAL: Status: ACTIVE | Noted: 2025-06-26

## 2025-07-01 ENCOUNTER — TELEPHONE (OUTPATIENT)
Dept: ENT CLINIC | Age: 65
End: 2025-07-01

## 2025-07-01 ENCOUNTER — OFFICE VISIT (OUTPATIENT)
Dept: ENT CLINIC | Age: 65
End: 2025-07-01

## 2025-07-01 VITALS
RESPIRATION RATE: 16 BRPM | OXYGEN SATURATION: 98 % | BODY MASS INDEX: 25.73 KG/M2 | HEART RATE: 60 BPM | WEIGHT: 190 LBS | TEMPERATURE: 97 F | DIASTOLIC BLOOD PRESSURE: 83 MMHG | SYSTOLIC BLOOD PRESSURE: 125 MMHG | HEIGHT: 72 IN

## 2025-07-01 DIAGNOSIS — H69.93 DYSFUNCTION OF BOTH EUSTACHIAN TUBES: Primary | ICD-10-CM

## 2025-07-01 PROCEDURE — 99024 POSTOP FOLLOW-UP VISIT: CPT | Performed by: OTOLARYNGOLOGY

## 2025-07-01 RX ORDER — OFLOXACIN 3 MG/ML
5 SOLUTION AURICULAR (OTIC) 2 TIMES DAILY
Qty: 10 ML | Refills: 3 | Status: SHIPPED | OUTPATIENT
Start: 2025-07-01 | End: 2025-07-11

## 2025-07-01 NOTE — TELEPHONE ENCOUNTER
Patient instructed per Dr Janusz bah to stop debrox and patient may continue ear drops to help calm down inflammation. Patient notified of recommendations.

## 2025-07-01 NOTE — PROGRESS NOTES
Subjective:      Patient ID:  Zachary Jacob is a 65 y.o. male.    HPI Comments: Pt returns for check of ear tubes, there have not been infections since last visit.  Pt has dullness and ringing in the right ear.     Tubes were placed 1 week ago June 2025     Patient's medications, allergies, past medical, surgical, social and family histories were reviewed and updated as appropriate.      Review of Systems   Constitutional: Negative.  Negative for crying and unexpected weight change.   HENT: EAR DISCHARGE: Yes; EAR PAIN: No  Eyes: Negative.  Negative for visual disturbance.   Respiratory: Negative.  Negative for stridor.    Cardiovascular: Negative.  Negative for chest pain.   Gastrointestinal: Negative.  Negative for abdominal distention, nausea and vomiting.   Skin: Negative.  Negative for color change.   Neurological: Negative for facial asymmetry.   Hematological: Negative.    Psychiatric/Behavioral: Negative.  Negative for hallucinations.   All other systems reviewed and are negative.          Objective:   Physical Exam   Constitutional: Patient appears well-developed and well-nourished.   HENT:   Head: Normocephalic and atraumatic. There is normal jaw occlusion.     Right Ear: old blood in the ear canal.   Cerumen Impaction: No  PE tube visualized: Yes   In the TM: Yes   Tube blocked: No   Drainage: No   Infection: No    Left Ear:   Cerumen Impaction: No  PE tube visualized: Yes   In the TM: Yes   Tube blocked: No   Drainage: No   Infection: No      Nose: Nose normal.   Mouth/Throat: Mucous membranes are moist. Dentition is normal. Oropharynx is clear.          Eyes: Conjunctivae and EOM are normal. Pupils are equal, round, and reactive to light.   Neck: Normal range of motion. Neck supple.   Cardiovascular: Regular rhythm,    Pulmonary/Chest: Effort normal and breath sounds normal.   Abdominal: Full and soft.   Musculoskeletal: Normal range of motion.   Neurological: Alert.   Skin: Skin is warm.

## 2025-07-01 NOTE — TELEPHONE ENCOUNTER
Bought over the counter ear drops (Debrox) after pt instilled them into the ear pt stated, \"it felt like the inside of his ear was on fire for about 5 minutes and then the burning subsided.\"     Please advise,     Electronically signed by Ja Major on 7/1/2025 at 2:05 PM

## 2025-07-10 ENCOUNTER — HOSPITAL ENCOUNTER (OUTPATIENT)
Dept: PULMONOLOGY | Age: 65
Discharge: HOME | End: 2025-07-10
Payer: MEDICARE

## 2025-07-10 DIAGNOSIS — R91.8 LUNG NODULES: ICD-10-CM

## 2025-07-10 PROCEDURE — 94060 EVALUATION OF WHEEZING: CPT

## 2025-07-10 PROCEDURE — 94729 DIFFUSING CAPACITY: CPT

## 2025-07-10 NOTE — PROCEDURES
[place] [address]                           PULMONARY FUNCTION      PATIENT NAME: XIOMARA CARPIO             : 1960  MED REC NO: [id number]                        ROOM:   ACCOUNT NO: [id number]                       ADMIT DATE: 07/10/2025  PROVIDER: Андрей Tucker MD      DATE OF PROCEDURE: 07/10/2025    SURGEON:  Андрей Tucker MD    REFERRING PHYSICIAN:  KVNG TIDWELL    FINDINGS:  The spirometry shows normal FVC and FEV1, whereas the FEV1/FVC is mildly decreased.  The maximum voluntary ventilation is 99% of the predicted value.    According to Z-score criteria, FVC, FEV1 and FEV1/FVC are under the area of curve, within standard deviation of -1.64.  After bronchodilator therapy, there is no improvement seen in the spirometry.    The diffusion capacity is normal.    IMPRESSION:  The above study shows mild obstructive ventilatory impairment according to GOLD criteria and there is no improvement after bronchodilator therapy.          АНДРЕЙ TUCKER MD      D:  07/10/2025 14:10:30     T:  07/10/2025 14:24:23     GEORGI/FLORENCIA  Job #:  [id number]     Doc#:  [id number]

## 2025-07-11 ENCOUNTER — HOSPITAL ENCOUNTER (EMERGENCY)
Age: 65
Discharge: HOME | End: 2025-07-12
Attending: EMERGENCY MEDICINE
Payer: MEDICARE

## 2025-07-11 DIAGNOSIS — I49.3 PVC (PREMATURE VENTRICULAR CONTRACTION): Primary | ICD-10-CM

## 2025-07-11 PROCEDURE — 99284 EMERGENCY DEPT VISIT MOD MDM: CPT

## 2025-07-12 VITALS
HEIGHT: 72 IN | OXYGEN SATURATION: 95 % | RESPIRATION RATE: 16 BRPM | TEMPERATURE: 97.3 F | HEART RATE: 67 BPM | WEIGHT: 187 LBS | BODY MASS INDEX: 25.33 KG/M2 | SYSTOLIC BLOOD PRESSURE: 99 MMHG | DIASTOLIC BLOOD PRESSURE: 75 MMHG

## 2025-07-12 LAB
ANION GAP SERPL CALCULATED.3IONS-SCNC: 11 MMOL/L (ref 7–16)
BASOPHILS # BLD: 0.02 K/UL (ref 0–0.2)
BASOPHILS NFR BLD: 0 % (ref 0–2)
BUN SERPL-MCNC: 16 MG/DL (ref 8–23)
CALCIUM SERPL-MCNC: 9.2 MG/DL (ref 8.8–10.2)
CHLORIDE SERPL-SCNC: 103 MMOL/L (ref 98–107)
CO2 SERPL-SCNC: 26 MMOL/L (ref 22–29)
CREAT SERPL-MCNC: 1 MG/DL (ref 0.7–1.2)
EOSINOPHIL # BLD: 0.2 K/UL (ref 0.05–0.5)
EOSINOPHILS RELATIVE PERCENT: 3 % (ref 0–6)
ERYTHROCYTE [DISTWIDTH] IN BLOOD BY AUTOMATED COUNT: 13.2 % (ref 11.5–15)
GFR, ESTIMATED: 83 ML/MIN/1.73M2
GLUCOSE SERPL-MCNC: 124 MG/DL (ref 74–99)
HCT VFR BLD AUTO: 40 % (ref 37–54)
HGB BLD-MCNC: 14.1 G/DL (ref 12.5–16.5)
IMM GRANULOCYTES # BLD AUTO: <0.03 K/UL (ref 0–0.58)
IMM GRANULOCYTES NFR BLD: 0 % (ref 0–5)
LYMPHOCYTES NFR BLD: 2.46 K/UL (ref 1.5–4)
LYMPHOCYTES RELATIVE PERCENT: 30 % (ref 20–42)
MCH RBC QN AUTO: 32.4 PG (ref 26–35)
MCHC RBC AUTO-ENTMCNC: 35.3 G/DL (ref 32–34.5)
MCV RBC AUTO: 92 FL (ref 80–99.9)
MONOCYTES NFR BLD: 0.62 K/UL (ref 0.1–0.95)
MONOCYTES NFR BLD: 8 % (ref 2–12)
NEUTROPHILS NFR BLD: 59 % (ref 43–80)
NEUTS SEG NFR BLD: 4.78 K/UL (ref 1.8–7.3)
PLATELET # BLD AUTO: 270 K/UL (ref 130–450)
PMV BLD AUTO: 8.9 FL (ref 7–12)
POTASSIUM SERPL-SCNC: 3.9 MMOL/L (ref 3.5–5.1)
RBC # BLD AUTO: 4.35 M/UL (ref 3.8–5.8)
SODIUM SERPL-SCNC: 140 MMOL/L (ref 136–145)
TROPONIN I SERPL HS-MCNC: <6 NG/L (ref 0–22)
WBC OTHER # BLD: 8.1 K/UL (ref 4.5–11.5)

## 2025-07-12 PROCEDURE — 80048 BASIC METABOLIC PNL TOTAL CA: CPT

## 2025-07-12 PROCEDURE — 84484 ASSAY OF TROPONIN QUANT: CPT

## 2025-07-12 PROCEDURE — 93005 ELECTROCARDIOGRAM TRACING: CPT | Performed by: EMERGENCY MEDICINE

## 2025-07-12 PROCEDURE — 85025 COMPLETE CBC W/AUTO DIFF WBC: CPT

## 2025-07-12 ASSESSMENT — PAIN - FUNCTIONAL ASSESSMENT: PAIN_FUNCTIONAL_ASSESSMENT: NONE - DENIES PAIN

## 2025-07-12 NOTE — ED PROVIDER NOTES
[facility] EMERGENCY DEPARTMENT  EMERGENCY DEPARTMENT ENCOUNTER      Pt Name: Zachary Jacob  MRN: [id number]  Birthdate 1960  Date of evaluation: 7/11/2025  Provider: Shree Dubose MD     CHIEF COMPLAINT       Chief Complaint   Patient presents with    Palpitations     over last 2 weeks increased heart palpations and lightheadedness, more common when laying down.           HISTORY OF PRESENT ILLNESS   (Location/Symptom, Timing/Onset, Context/Setting, Quality, Duration, Modifying Factors, Severity) Note limiting factors.        HPI    Zachary Jacob is a 65 y.o. male who presents to the emergency department palpitations history of A-fib but had an ablation 14 years ago and has not had issues since he has had 2 weeks of intermittent palpitations feeling his heart skipping a beat slightly short of breath notices it mostly at night no other acute complaints no weight gain no chest pain no fever cough    Nursing Notes were reviewed.    REVIEW OF SYSTEMS    (2+ for level 4; 10+ for level 5)   Review of Systems    PAST MEDICAL HISTORY     Past Medical History:   Diagnosis Date    Anxiety     Atrial fibrillation (HCC) 11/13/2011    S/P ablation       Caffeine abuse ([facility])     12 cups per day    Cardiomyopathy (HCC)     [place], see yearly    Chronic bilateral low back pain without sciatica 12/04/2019    Chronic bronchitis (HCC)     Chronic bronchitis with productive mucopurulent cough (HCC) 08/13/2021    Pt with chronic AM cough upon wakening.    No wheezing.  Not on meds      Chronic rhinitis     exposed to dust    COPD (chronic obstructive pulmonary disease) (HCC) 05/20/2015    Mild per PFT's 2014 [facility]    COVID-19 11/23/2021    Diagnosis added to problem list by Kevin Musa based on transcribed order from Dr. Perdomo.     History of alcohol abuse     Hyperlipidemia     Hypertension     IGT (impaired glucose tolerance) 11/18/2015    Interstitial lung disease (HCC) 01/02/2024    Left  out ischemic process normally comes back normal and he just that his PVCs are he is in sinus rhythm and feel comfortable discharging follow-up with cardiolog later this week.  Will consider admission for telemetry monitoring but he wants to go home which is reasonable  REVAL:     Patient has some PVCs on monitor will refer back to cardiology work appears benign    CRITICAL CARE TIME   Total Critical Care time was  minutes, excluding separately reportable procedures.  There was a high probability of clinically significant/life threatening deterioration in the patient's condition which required my urgent intervention.     CONSULTS:  None    PROCEDURES:  Unless otherwise noted below, none     Procedures    Patients symptoms are consistent with sepsis, severe sepsis, or septic shock (If yes use \".sepsiscoremeasure\"):   FINAL IMPRESSION    No diagnosis found.      DISPOSITION/PLAN   DISPOSITION                 PATIENT REFERRED TO:  No follow-up provider specified.    DISCHARGE MEDICATIONS:  New Prescriptions    No medications on file          (Please note:  Portions of this note were completed with a voice recognition program.  Efforts were made to edit the dictations but occasionally words and phrases are mis-transcribed.)  Form v2016.J.[id number]    Shree Dubose MD (electronically signed)  Emergency Medicine Provider       Shree Dubose MD  07/12/25 0020       Shree Dubose MD  07/12/25 0156

## 2025-07-15 ENCOUNTER — OFFICE VISIT (OUTPATIENT)
Dept: ENT CLINIC | Age: 65
End: 2025-07-15
Payer: MEDICARE

## 2025-07-15 VITALS
HEART RATE: 64 BPM | HEIGHT: 72 IN | BODY MASS INDEX: 25.42 KG/M2 | WEIGHT: 187.7 LBS | DIASTOLIC BLOOD PRESSURE: 88 MMHG | SYSTOLIC BLOOD PRESSURE: 128 MMHG | TEMPERATURE: 97.3 F | OXYGEN SATURATION: 97 %

## 2025-07-15 DIAGNOSIS — J30.1 SEASONAL ALLERGIC RHINITIS DUE TO POLLEN: ICD-10-CM

## 2025-07-15 DIAGNOSIS — H69.93 DYSFUNCTION OF BOTH EUSTACHIAN TUBES: Primary | ICD-10-CM

## 2025-07-15 LAB
EKG ATRIAL RATE: 60 BPM
EKG P AXIS: 63 DEGREES
EKG P-R INTERVAL: 164 MS
EKG Q-T INTERVAL: 432 MS
EKG QRS DURATION: 116 MS
EKG QTC CALCULATION (BAZETT): 432 MS
EKG R AXIS: 55 DEGREES
EKG T AXIS: 62 DEGREES
EKG VENTRICULAR RATE: 60 BPM

## 2025-07-15 PROCEDURE — 3079F DIAST BP 80-89 MM HG: CPT | Performed by: OTOLARYNGOLOGY

## 2025-07-15 PROCEDURE — 3074F SYST BP LT 130 MM HG: CPT | Performed by: OTOLARYNGOLOGY

## 2025-07-15 PROCEDURE — 1159F MED LIST DOCD IN RCRD: CPT | Performed by: OTOLARYNGOLOGY

## 2025-07-15 PROCEDURE — 1160F RVW MEDS BY RX/DR IN RCRD: CPT | Performed by: OTOLARYNGOLOGY

## 2025-07-15 PROCEDURE — 93010 ELECTROCARDIOGRAM REPORT: CPT | Performed by: INTERNAL MEDICINE

## 2025-07-15 PROCEDURE — 99212 OFFICE O/P EST SF 10 MIN: CPT | Performed by: OTOLARYNGOLOGY

## 2025-07-15 PROCEDURE — 1123F ACP DISCUSS/DSCN MKR DOCD: CPT | Performed by: OTOLARYNGOLOGY

## 2025-07-18 ENCOUNTER — OFFICE VISIT (OUTPATIENT)
Dept: FAMILY MEDICINE CLINIC | Age: 65
End: 2025-07-18
Payer: MEDICARE

## 2025-07-18 VITALS
RESPIRATION RATE: 19 BRPM | DIASTOLIC BLOOD PRESSURE: 85 MMHG | BODY MASS INDEX: 25.77 KG/M2 | SYSTOLIC BLOOD PRESSURE: 124 MMHG | WEIGHT: 190 LBS | HEART RATE: 58 BPM | TEMPERATURE: 98.6 F

## 2025-07-18 DIAGNOSIS — R73.02 IMPAIRED GLUCOSE TOLERANCE: ICD-10-CM

## 2025-07-18 DIAGNOSIS — J44.9 COPD, MILD (HCC): ICD-10-CM

## 2025-07-18 DIAGNOSIS — R00.2 HEART PALPITATIONS: Primary | ICD-10-CM

## 2025-07-18 PROBLEM — Z98.890 HISTORY OF RADIOFREQUENCY ABLATION (RFA) PROCEDURE FOR CARDIAC ARRHYTHMIA: Status: ACTIVE | Noted: 2025-07-18

## 2025-07-18 LAB — HBA1C MFR BLD: 5.7 % (ref 4–5.6)

## 2025-07-18 PROCEDURE — 36415 COLL VENOUS BLD VENIPUNCTURE: CPT | Performed by: FAMILY MEDICINE

## 2025-07-18 PROCEDURE — 3074F SYST BP LT 130 MM HG: CPT | Performed by: FAMILY MEDICINE

## 2025-07-18 PROCEDURE — 1159F MED LIST DOCD IN RCRD: CPT | Performed by: FAMILY MEDICINE

## 2025-07-18 PROCEDURE — 1123F ACP DISCUSS/DSCN MKR DOCD: CPT | Performed by: FAMILY MEDICINE

## 2025-07-18 PROCEDURE — 3079F DIAST BP 80-89 MM HG: CPT | Performed by: FAMILY MEDICINE

## 2025-07-18 PROCEDURE — 99213 OFFICE O/P EST LOW 20 MIN: CPT | Performed by: FAMILY MEDICINE

## 2025-07-18 SDOH — ECONOMIC STABILITY: FOOD INSECURITY: WITHIN THE PAST 12 MONTHS, YOU WORRIED THAT YOUR FOOD WOULD RUN OUT BEFORE YOU GOT MONEY TO BUY MORE.: NEVER TRUE

## 2025-07-18 SDOH — ECONOMIC STABILITY: FOOD INSECURITY: WITHIN THE PAST 12 MONTHS, THE FOOD YOU BOUGHT JUST DIDN'T LAST AND YOU DIDN'T HAVE MONEY TO GET MORE.: NEVER TRUE

## 2025-07-21 ENCOUNTER — TELEPHONE (OUTPATIENT)
Dept: ENT CLINIC | Age: 65
End: 2025-07-21

## 2025-07-21 NOTE — TELEPHONE ENCOUNTER
After two week post tube surgery, \"there is ringing in pts ear and pt is losing more and more hearing.\" Pt stated, \"before tube surgery he had 100% hearing now its getting worse.\"     Please advise on next steps.     Electronically signed by Ja Major on 7/21/2025 at 10:57 AM

## 2025-07-23 ENCOUNTER — PROCEDURE VISIT (OUTPATIENT)
Dept: AUDIOLOGY | Age: 65
End: 2025-07-23
Payer: MEDICARE

## 2025-07-23 DIAGNOSIS — H65.491 CHRONIC OTITIS MEDIA OF RIGHT EAR WITH EFFUSION: ICD-10-CM

## 2025-07-23 DIAGNOSIS — H69.93 DYSFUNCTION OF BOTH EUSTACHIAN TUBES: ICD-10-CM

## 2025-07-23 DIAGNOSIS — H90.6 MIXED CONDUCTIVE AND SENSORINEURAL HEARING LOSS OF BOTH EARS: Primary | ICD-10-CM

## 2025-07-23 PROCEDURE — 92557 COMPREHENSIVE HEARING TEST: CPT

## 2025-07-23 PROCEDURE — 92567 TYMPANOMETRY: CPT

## 2025-07-23 NOTE — PROGRESS NOTES
AUDIOMETRIC EVALUATION    REASON FOR REFERRAL:  This patient was referred for audiometric testing by Dr. Boudreaux due to new onset of tinnitus and hearing loss since placement of PE tubes on 6/23/2025.  Patient reported that following the placement of tubes, he does not feel that he hears as well and reported constant bothersome tinnitus. He also reported constant sinus drainage and reported blood from ear canals. Patient's wife reported that patient has been depressed due to tinnitus. Patient had a hearing screening at another facility and the screening revealed that patient passed at all testing frequencies, though no thresholds were documented.       RESULTS:  Pure tone audiometric testing using  earphones was carried out. Results revealed a mild to moderate conductive hearing loss  through 1000 Hz sloping to a mild sensorineural loss beyond in the right ear and hearing sensitivity within normal limits through 1500 Hz sloping to a mild loss at 3000 Hz and beyond in the left ear. Speech reception thresholds were obtained at 25 dB HL in the right ear and  15 dB HL in the left ear. Speech discrimination testing was performed at 60 dB HL with 30 dB HL of contralateral masking in the right ear and performed at 50 dB HL in the left ear. Obtained were scores of 84% in the right ear and 92% in the left ear. Masked Bone Conduction Testing revealed air-bone gaps greater than 15 dB Hz, consistent with conductive/mixed hearing losses.    Tympanometry was administered and revealed flat tympanograms with large ear canal volumes suggesting patent PE tubes, bilaterally, though it is noted that the volume of the right ear was smaller than on the left.      IMPRESSION:    An ENT consult is suggested if you feel it is clinically warranted and per patient's request. A re-evaluation is recommended as determined by the provider.     The above results were reviewed with the patient and his wife.      If I can be of further assistance

## 2025-07-24 ENCOUNTER — TELEPHONE (OUTPATIENT)
Dept: ENT CLINIC | Age: 65
End: 2025-07-24

## 2025-07-24 NOTE — TELEPHONE ENCOUNTER
----- Message from Negin MARIN sent at 7/23/2025  1:56 PM EDT -----  Regarding: OP hearing eval results/consult request  Patient was seen as an OP hearing eval per Dr. PHAN. Please advise patient on next steps.    Thanks, Negin

## 2025-07-25 ENCOUNTER — TELEPHONE (OUTPATIENT)
Dept: AUDIOLOGY | Age: 65
End: 2025-07-25

## 2025-07-25 NOTE — TELEPHONE ENCOUNTER
Patient called to discuss his hearing and ringing.  Patient stated that after before his tubes he could hear fine just experienced pressure and he could have lived with that. Post tubes, he can not hear and has constant ringing. He stated the ringing was few times a day off and on for 30 seconds, but now its constant. He stated he can't even focus with the way he feels now.     Advised him that his right ear is slightly conductive ( and explained what that meant) and that could be from tube placement or the tube may be slightly clogged.         Spoke with patient and stated drops may help loosen what is in his ear.  Stated that Dr Boudreaux is out of the office and will not be back in clinic until Tuesday.     Doesn't want to do the drops - they made him feel muffled and he did not like that.     Stated I will route a message to the staff to have them reach out next week to get him scheduled to be seen.

## 2025-07-29 NOTE — TELEPHONE ENCOUNTER
Called patient to schedule appointment to remove ear tube since affecting hearing patient states will wait and see if ringing subsides and call in to have ear tube removal in office if not better in next few weeks.

## (undated) DEVICE — TUBING, SUCTION, 3/16" X 12', STRAIGHT: Brand: MEDLINE

## (undated) DEVICE — 4-PORT MANIFOLD: Brand: NEPTUNE 2

## (undated) DEVICE — GOWN,SIRUS,FABRNF,XL,20/CS: Brand: MEDLINE

## (undated) DEVICE — GLOVE ORANGE PI 8 1/2   MSG9085

## (undated) DEVICE — SET FORCEP MICROFRANCE LARYNGEAL

## (undated) DEVICE — BASIC SINGLE BASIN 1-LF: Brand: MEDLINE INDUSTRIES, INC.

## (undated) DEVICE — TOWEL,OR,DSP,ST,BLUE,STD,6/PK,12PK/CS: Brand: MEDLINE

## (undated) DEVICE — MARKER,SKIN,WI/RULER AND LABELS: Brand: MEDLINE

## (undated) DEVICE — SHEET,DRAPE,40X58,STERILE: Brand: MEDLINE

## (undated) DEVICE — GENERAL SCOPES AND LIGHT CORD 5 AND 10MM

## (undated) DEVICE — SPONGE GZ W4XL4IN RAYON POLY CVR W/NONWOVEN FAB STRL 2/PK

## (undated) DEVICE — SURGICAL PROCEDURE PACK EENT CUST

## (undated) DEVICE — SET SURG BASIN MAYO REUSABLE

## (undated) DEVICE — CODMAN® SURGICAL PATTIES 1/2" X 1/2" (1.27CM X 1.27CM): Brand: CODMAN®

## (undated) DEVICE — BLADE MYR OFFSET 45DEG SPEAR TIP NAR SHFT W/ RND KNURLED

## (undated) DEVICE — HOLDERS JAKO

## (undated) DEVICE — GUARD TEETH AD PR NYL

## (undated) DEVICE — KIT,ANTI FOG,W/SPONGE & FLUID,SOFT PACK: Brand: MEDLINE

## (undated) DEVICE — COTTON STRIP: Brand: DEROYAL